# Patient Record
Sex: FEMALE | Race: WHITE | Employment: OTHER | URBAN - METROPOLITAN AREA
[De-identification: names, ages, dates, MRNs, and addresses within clinical notes are randomized per-mention and may not be internally consistent; named-entity substitution may affect disease eponyms.]

---

## 2017-01-09 ENCOUNTER — OFFICE VISIT (OUTPATIENT)
Dept: ORTHOPEDICS CLINIC | Facility: CLINIC | Age: 42
End: 2017-01-09

## 2017-01-09 DIAGNOSIS — M22.41 CHONDROMALACIA, PATELLA, RIGHT: Primary | ICD-10-CM

## 2017-01-09 PROCEDURE — 99214 OFFICE O/P EST MOD 30 MIN: CPT | Performed by: ORTHOPAEDIC SURGERY

## 2017-01-09 PROCEDURE — 99212 OFFICE O/P EST SF 10 MIN: CPT | Performed by: ORTHOPAEDIC SURGERY

## 2017-01-09 NOTE — PROGRESS NOTES
In the parapatellar location. It is worse when she does stairs or bending like maneuvers and she is aware of occasional click when she goes from a flexed to an extended position.   She has had episodes of falling but there was not a trauma that initiated t unremarkable for any advanced arthritis    Impression right knee patellar chondromalacia    Plan I discussed with her the biomechanics that can aggravate this stairclimbing can aggravate it squatting deep knee bending.   I recommended of aggressive physical

## 2017-01-21 ENCOUNTER — HOSPITAL ENCOUNTER (OUTPATIENT)
Dept: NUTRITION | Facility: HOSPITAL | Age: 42
Discharge: HOME | End: 2017-01-21
Attending: INTERNAL MEDICINE
Payer: MEDICAID

## 2017-01-21 DIAGNOSIS — E66.01 MORBID OBESITY (HCC): Primary | ICD-10-CM

## 2017-01-21 PROCEDURE — 97803 MED NUTRITION INDIV SUBSEQ: CPT | Performed by: DIETITIAN, REGISTERED

## 2017-01-21 NOTE — PROGRESS NOTES
Nutrition Assessment    Elier Cash is a 39year old female. Referred by:  Attending  Referring Physician Kiara Vegas MD   Assessment     Medical Nutrition Therapy Comment: Morbid Obesity  Visit Information: Follow-up Visit 1/21/17    [facility] Verbalize Understanding    Patient and/or Family Ability to Learn: Retain Information    Readiness to Learn:  Motivated    Barriers to Learning: None      1/21/2017  Maria C Porter RD

## 2017-01-27 ENCOUNTER — OFFICE VISIT (OUTPATIENT)
Dept: PHYSICAL THERAPY | Facility: HOSPITAL | Age: 42
End: 2017-01-27
Attending: ORTHOPAEDIC SURGERY
Payer: MEDICAID

## 2017-01-27 DIAGNOSIS — M22.41 CHONDROMALACIA, PATELLA, RIGHT: Primary | ICD-10-CM

## 2017-01-27 PROCEDURE — 97110 THERAPEUTIC EXERCISES: CPT

## 2017-01-27 PROCEDURE — 97162 PT EVAL MOD COMPLEX 30 MIN: CPT

## 2017-01-27 NOTE — PROGRESS NOTES
LOWER EXTREMITY EVALUATION:   Referring Physician: Dr. Guy Ruvalcaba  Date of Onset: 6 months ago Date of Service: 1/27/2017   Diagnosis: Chondromalacia patella, right   PATIENT SUMMARY:   Elier Cash is a 39year old y/o female who presents to therapy toda These deficits are contributing to difficulty with ambulation, stairs, and prolonged sitting. Unice Motts would benefit from skilled Physical Therapy to address the above impairments to allow for return to prior level of function.     Precautions:  None     OB Stim; Patient education; Home exercise program instruction    Education or treatment limitation: None  Rehab Potential:good      Patient was advised of these findings, precautions, and treatment options and has agreed to actively participate in planning an

## 2017-02-01 ENCOUNTER — OFFICE VISIT (OUTPATIENT)
Dept: PHYSICAL THERAPY | Facility: HOSPITAL | Age: 42
End: 2017-02-01
Attending: ORTHOPAEDIC SURGERY
Payer: MEDICAID

## 2017-02-01 DIAGNOSIS — M22.41 CHONDROMALACIA, PATELLA, RIGHT: Primary | ICD-10-CM

## 2017-02-01 PROCEDURE — 97110 THERAPEUTIC EXERCISES: CPT

## 2017-02-01 NOTE — PROGRESS NOTES
DX: Chondromalacia patella, right   Authorized # of Visits:  2         Next MD visit: none scheduled  Fall Risk: standard         Precautions: n/a           Medication Changes since last visit?: No  Subjective: Patient reports that right knee 5/10 pain tod

## 2017-02-03 ENCOUNTER — OFFICE VISIT (OUTPATIENT)
Dept: PHYSICAL THERAPY | Facility: HOSPITAL | Age: 42
End: 2017-02-03
Attending: ORTHOPAEDIC SURGERY
Payer: MEDICAID

## 2017-02-03 DIAGNOSIS — M22.41 CHONDROMALACIA, PATELLA, RIGHT: Primary | ICD-10-CM

## 2017-02-03 PROCEDURE — 97110 THERAPEUTIC EXERCISES: CPT

## 2017-02-03 NOTE — PROGRESS NOTES
DX: Chondromalacia patella, right   Authorized # of Visits:  3/7         Next MD visit: none scheduled  Fall Risk: standard         Precautions: n/a           Medication Changes since last visit?: No  Subjective: Patient reports her knee has been really so

## 2017-02-06 ENCOUNTER — OFFICE VISIT (OUTPATIENT)
Dept: PHYSICAL THERAPY | Facility: HOSPITAL | Age: 42
End: 2017-02-06
Attending: ORTHOPAEDIC SURGERY
Payer: MEDICAID

## 2017-02-06 DIAGNOSIS — M22.41 CHONDROMALACIA, PATELLA, RIGHT: Primary | ICD-10-CM

## 2017-02-06 PROCEDURE — 97110 THERAPEUTIC EXERCISES: CPT

## 2017-02-06 NOTE — PROGRESS NOTES
DX: Chondromalacia patella, right   Authorized # of Visits:  4/7         Next MD visit: none scheduled  Fall Risk: standard         Precautions: n/a           Medication Changes since last visit?: No  Subjective: Patient reports a little soreness in her kn

## 2017-02-08 ENCOUNTER — APPOINTMENT (OUTPATIENT)
Dept: PHYSICAL THERAPY | Facility: HOSPITAL | Age: 42
End: 2017-02-08
Attending: ORTHOPAEDIC SURGERY
Payer: MEDICAID

## 2017-02-10 ENCOUNTER — OFFICE VISIT (OUTPATIENT)
Dept: PHYSICAL THERAPY | Facility: HOSPITAL | Age: 42
End: 2017-02-10
Attending: ORTHOPAEDIC SURGERY
Payer: MEDICAID

## 2017-02-10 PROCEDURE — 97110 THERAPEUTIC EXERCISES: CPT

## 2017-02-10 NOTE — PROGRESS NOTES
DX: Chondromalacia patella, right   Authorized # of Visits:  5/7         Next MD visit: none scheduled  Fall Risk: standard         Precautions: n/a           Medication Changes since last visit?: No  Subjective: Patient reports she is feeling better, noti

## 2017-02-13 ENCOUNTER — APPOINTMENT (OUTPATIENT)
Dept: PHYSICAL THERAPY | Facility: HOSPITAL | Age: 42
End: 2017-02-13
Attending: ORTHOPAEDIC SURGERY
Payer: MEDICAID

## 2017-02-15 ENCOUNTER — APPOINTMENT (OUTPATIENT)
Dept: PHYSICAL THERAPY | Facility: HOSPITAL | Age: 42
End: 2017-02-15
Attending: ORTHOPAEDIC SURGERY
Payer: MEDICAID

## 2017-02-17 ENCOUNTER — OFFICE VISIT (OUTPATIENT)
Dept: PHYSICAL THERAPY | Facility: HOSPITAL | Age: 42
End: 2017-02-17
Attending: ORTHOPAEDIC SURGERY
Payer: MEDICAID

## 2017-02-17 DIAGNOSIS — M22.41 CHONDROMALACIA, PATELLA, RIGHT: Primary | ICD-10-CM

## 2017-02-17 PROCEDURE — 97110 THERAPEUTIC EXERCISES: CPT

## 2017-02-17 NOTE — PROGRESS NOTES
DX: Chondromalacia patella, right   Authorized # of Visits:  6/7         Next MD visit: none scheduled  Fall Risk: standard         Precautions: n/a           Medication Changes since last visit?: No  Subjective: Patient reports the knee has been feeling g

## 2017-02-21 ENCOUNTER — OFFICE VISIT (OUTPATIENT)
Dept: INTERNAL MEDICINE CLINIC | Facility: CLINIC | Age: 42
End: 2017-02-21

## 2017-02-21 VITALS
DIASTOLIC BLOOD PRESSURE: 78 MMHG | WEIGHT: 262 LBS | RESPIRATION RATE: 20 BRPM | SYSTOLIC BLOOD PRESSURE: 119 MMHG | TEMPERATURE: 99 F | HEART RATE: 76 BPM | HEIGHT: 60 IN | BODY MASS INDEX: 51.44 KG/M2

## 2017-02-21 DIAGNOSIS — L03.90 CELLULITIS, UNSPECIFIED CELLULITIS SITE: ICD-10-CM

## 2017-02-21 DIAGNOSIS — J01.00 ACUTE NON-RECURRENT MAXILLARY SINUSITIS: ICD-10-CM

## 2017-02-21 DIAGNOSIS — H66.91 OTITIS, RIGHT: Primary | ICD-10-CM

## 2017-02-21 PROCEDURE — 99213 OFFICE O/P EST LOW 20 MIN: CPT | Performed by: INTERNAL MEDICINE

## 2017-02-21 PROCEDURE — 99212 OFFICE O/P EST SF 10 MIN: CPT | Performed by: INTERNAL MEDICINE

## 2017-02-21 RX ORDER — RIZATRIPTAN BENZOATE 10 MG/1
TABLET ORAL AS NEEDED
Refills: 5 | COMMUNITY
Start: 2016-12-30 | End: 2019-02-06

## 2017-02-21 RX ORDER — CHLORHEXIDINE GLUCONATE 0.12 MG/ML
RINSE ORAL
Refills: 0 | COMMUNITY
Start: 2017-02-09 | End: 2017-04-17 | Stop reason: ALTCHOICE

## 2017-02-21 RX ORDER — NEOMYCIN SULFATE, POLYMYXIN B SULFATE, HYDROCORTISONE 3.5; 10000; 1 MG/ML; [USP'U]/ML; MG/ML
SOLUTION/ DROPS AURICULAR (OTIC)
Qty: 1 BOTTLE | Refills: 1 | Status: SHIPPED | OUTPATIENT
Start: 2017-02-21 | End: 2017-04-17 | Stop reason: ALTCHOICE

## 2017-02-21 RX ORDER — CEFADROXIL 500 MG/1
500 CAPSULE ORAL 2 TIMES DAILY
Qty: 20 CAPSULE | Refills: 1 | Status: SHIPPED | OUTPATIENT
Start: 2017-02-21 | End: 2017-04-17 | Stop reason: ALTCHOICE

## 2017-02-22 NOTE — PROGRESS NOTES
HPI:    Patient ID: Alfredo Alcaraz is a 39year old female. HPI    Right ear ache  Sinus pressure  c/o right earache for about 4 days. Noticed small lump behind ear.      Right buttocks sore  Unsure of etiology    /78 mmHg  Pulse 76  Temp(Src) 98 Shampoo 2-3 x weekly as directed Disp: 120 mL Rfl: 10   Cholecalciferol (VITAMIN D3) 2000 UNITS Oral Tab Take by mouth daily. Disp:  Rfl:    Calcium Carbonate-Vit D-Min (CALCIUM 1200 OR) Take by mouth daily.  Disp:  Rfl:    Multiple Vitamins-Minerals (MULTI ASSESSMENT/PLAN:   (H66.91) Otitis, right  (primary encounter diagnosis)  Plan: RX cortisporin    (J01.00) Acute non-recurrent maxillary sinusitis  Plan: cefadroxil    (L03.90) Cellulitis, unspecified cellulitis site  Plan: cefadroxil  Keep woun

## 2017-02-24 ENCOUNTER — OFFICE VISIT (OUTPATIENT)
Dept: PHYSICAL THERAPY | Facility: HOSPITAL | Age: 42
End: 2017-02-24
Attending: ORTHOPAEDIC SURGERY
Payer: MEDICAID

## 2017-02-24 PROCEDURE — 97110 THERAPEUTIC EXERCISES: CPT

## 2017-02-24 NOTE — PROGRESS NOTES
Patient Name: Isabel Banuelos, : 1975, MRN: [id number]   Date:  2017  Referring Physician:  Wilbur Taylor    Diagnosis: Chondromalacia patella R    Progress Summary    Pt has attended 7 of 7 approved visits in Physical Therapy.      Progres day period. Treatment will include: R hip/quad strengthening, RLE stretching, proprioceptive training, STM/modalities as needed.         Patient/Family/Caregiver was advised of these findings, precautions, and treatment options and has agreed to actively pa

## 2017-03-06 ENCOUNTER — OFFICE VISIT (OUTPATIENT)
Dept: PHYSICAL THERAPY | Facility: HOSPITAL | Age: 42
End: 2017-03-06
Attending: ORTHOPAEDIC SURGERY
Payer: MEDICAID

## 2017-03-06 PROCEDURE — 97110 THERAPEUTIC EXERCISES: CPT

## 2017-03-06 NOTE — PROGRESS NOTES
DX: Chondromalacia patella, right   Authorized # of Visits:  8/13         Next MD visit: none scheduled  Fall Risk: standard         Precautions: n/a           Medication Changes since last visit?: No  Subjective: Patient reports the knee has been feeling

## 2017-03-10 ENCOUNTER — OFFICE VISIT (OUTPATIENT)
Dept: PHYSICAL THERAPY | Facility: HOSPITAL | Age: 42
End: 2017-03-10
Attending: ORTHOPAEDIC SURGERY
Payer: MEDICAID

## 2017-03-10 PROCEDURE — 97110 THERAPEUTIC EXERCISES: CPT

## 2017-03-10 NOTE — PROGRESS NOTES
DX: Chondromalacia patella, right   Authorized # of Visits:  9/13         Next MD visit: none scheduled  Fall Risk: standard         Precautions: n/a           Medication Changes since last visit?: No  Subjective: Patient reports she had one \"burst of ayad

## 2017-03-13 ENCOUNTER — OFFICE VISIT (OUTPATIENT)
Dept: PHYSICAL THERAPY | Facility: HOSPITAL | Age: 42
End: 2017-03-13
Attending: ORTHOPAEDIC SURGERY
Payer: MEDICAID

## 2017-03-13 PROCEDURE — 97110 THERAPEUTIC EXERCISES: CPT

## 2017-03-13 NOTE — PROGRESS NOTES
DX: Chondromalacia patella, right   Authorized # of Visits:  10/13         Next MD visit: none scheduled  Fall Risk: standard         Precautions: n/a           Medication Changes since last visit?: No  Subjective: \"I feel much better after last session n

## 2017-03-17 ENCOUNTER — OFFICE VISIT (OUTPATIENT)
Dept: PHYSICAL THERAPY | Facility: HOSPITAL | Age: 42
End: 2017-03-17
Attending: ORTHOPAEDIC SURGERY
Payer: MEDICAID

## 2017-03-17 PROCEDURE — 97110 THERAPEUTIC EXERCISES: CPT

## 2017-03-17 NOTE — PROGRESS NOTES
DX: Chondromalacia patella, right   Authorized # of Visits:  11/13         Next MD visit: none scheduled  Fall Risk: standard         Precautions: n/a           Medication Changes since last visit?: No  Subjective:  Patient reports her knee is sore due to

## 2017-03-18 ENCOUNTER — HOSPITAL ENCOUNTER (OUTPATIENT)
Dept: NUTRITION | Facility: HOSPITAL | Age: 42
Discharge: HOME | End: 2017-03-18
Attending: INTERNAL MEDICINE
Payer: MEDICAID

## 2017-03-18 DIAGNOSIS — E66.01 MORBID OBESITY DUE TO EXCESS CALORIES (HCC): Primary | ICD-10-CM

## 2017-03-18 PROCEDURE — 97803 MED NUTRITION INDIV SUBSEQ: CPT | Performed by: DIETITIAN, REGISTERED

## 2017-03-18 NOTE — PROGRESS NOTES
Nutrition Assessment    Zohra Diggs is a 39year old female. Referred by:  Attending  Referring Physician Name: Mel Damon MD   Assessment     Medical Nutrition Therapy Comment: Morbid Obesity  Visit Information: Follow-up Visit 3/18/17    [facility] when PT allows    Recommendation to MD: Continue f/u with RD every 6 weeks.  Next appointment 4/20/17    Assessment of Ability/Barriers     Patient and/or Family Will: Verbalize Understanding    Patient and/or Family Ability to Learn: Retain Information

## 2017-03-20 ENCOUNTER — OFFICE VISIT (OUTPATIENT)
Dept: PHYSICAL THERAPY | Facility: HOSPITAL | Age: 42
End: 2017-03-20
Attending: ORTHOPAEDIC SURGERY
Payer: MEDICAID

## 2017-03-20 PROCEDURE — 97110 THERAPEUTIC EXERCISES: CPT

## 2017-03-20 NOTE — PROGRESS NOTES
DX: Chondromalacia patella, right   Authorized # of Visits:  12/13         Next MD visit: none scheduled  Fall Risk: standard         Precautions: n/a           Medication Changes since last visit?: No  Subjective:  Patient reports her knee has been more a

## 2017-03-23 ENCOUNTER — OFFICE VISIT (OUTPATIENT)
Dept: PHYSICAL THERAPY | Facility: HOSPITAL | Age: 42
End: 2017-03-23
Attending: ORTHOPAEDIC SURGERY
Payer: MEDICAID

## 2017-03-23 PROCEDURE — 97110 THERAPEUTIC EXERCISES: CPT

## 2017-03-23 NOTE — PROGRESS NOTES
Patient Name: [place], : 1975, MRN: [id number]   Date:  3/23/2017  Referring Physician:  Jose Alejandro Abernathy    Diagnosis: Chondromalacia patella, right     Discharge Summary    Pt has attended 13 visits in Physical Therapy.      Progress Note Dept: [phone].     Sincerely,  Sugey Castro    Electronically signed by therapist: Sugey Castro, PT        DX: Chondromalacia patella, right   Authorized # of Visits:  13/13         Next MD visit: none scheduled  Fall Risk: standard         Precautio

## 2017-03-29 ENCOUNTER — TELEPHONE (OUTPATIENT)
Dept: INTERNAL MEDICINE CLINIC | Facility: CLINIC | Age: 42
End: 2017-03-29

## 2017-03-29 NOTE — TELEPHONE ENCOUNTER
[facility] would like to know if the cpap/bipap renewal questionire that was faxed on 8/17/16 is still on file? If so they would like to have copy of it faxed back over please. Fax:[phone].  If not then they can resend it but it will

## 2017-04-17 ENCOUNTER — OFFICE VISIT (OUTPATIENT)
Dept: INTERNAL MEDICINE CLINIC | Facility: CLINIC | Age: 42
End: 2017-04-17

## 2017-04-17 VITALS
HEART RATE: 80 BPM | DIASTOLIC BLOOD PRESSURE: 79 MMHG | TEMPERATURE: 98 F | SYSTOLIC BLOOD PRESSURE: 121 MMHG | RESPIRATION RATE: 18 BRPM | WEIGHT: 265 LBS | HEIGHT: 60 IN | BODY MASS INDEX: 52.03 KG/M2

## 2017-04-17 DIAGNOSIS — L65.9 HAIR LOSS: Primary | ICD-10-CM

## 2017-04-17 DIAGNOSIS — G47.33 OSA ON CPAP: ICD-10-CM

## 2017-04-17 DIAGNOSIS — Z99.89 OSA ON CPAP: ICD-10-CM

## 2017-04-17 DIAGNOSIS — I83.93 VARICOSE VEINS OF BOTH LOWER EXTREMITIES: ICD-10-CM

## 2017-04-17 DIAGNOSIS — E66.01 MORBID OBESITY DUE TO EXCESS CALORIES (HCC): ICD-10-CM

## 2017-04-17 PROCEDURE — 99212 OFFICE O/P EST SF 10 MIN: CPT | Performed by: INTERNAL MEDICINE

## 2017-04-17 PROCEDURE — 99214 OFFICE O/P EST MOD 30 MIN: CPT | Performed by: INTERNAL MEDICINE

## 2017-04-17 RX ORDER — AZITHROMYCIN 500 MG/1
500 TABLET, FILM COATED ORAL DAILY
Qty: 5 TABLET | Refills: 1 | Status: SHIPPED | OUTPATIENT
Start: 2017-04-17 | End: 2017-07-25 | Stop reason: ALTCHOICE

## 2017-04-30 NOTE — PROGRESS NOTES
HPI:    Patient ID: Frances Mcdonough is a 39year old female. HPI     Follow up      Patient c/o blurred vision for past 1 month. Patient c/o hair loss to scalp for past 6 months and increased the past 2 months.      Extreme obesity  gianing wt  Was se Negative for cough, chest tightness, shortness of breath and wheezing. Cardiovascular: Positive for leg swelling. Negative for chest pain and palpitations.         Varicose veins   Gastrointestinal: Negative for nausea, vomiting, abdominal pain, diarrhea Smoking Status: Former Smoker                   Packs/Day: 0.00  Years: 20        Types: Cigarettes      Quit date: 01/01/2013    Smokeless Status: Never Used                        Alcohol Use: Yes           0.0 oz/week       0 Standard drinks or equivale and agrees with plan      Meds This Visit:  Signed Prescriptions Disp Refills    azithromycin (ZITHROMAX) 500 MG Oral Tab 5 tablet 1      Sig: Take 1 tablet (500 mg total) by mouth daily.            Imaging & Referrals:  DERM - INTERNAL  BARIATRICS - INTERN

## 2017-05-17 ENCOUNTER — OFFICE VISIT (OUTPATIENT)
Dept: DERMATOLOGY CLINIC | Facility: CLINIC | Age: 42
End: 2017-05-17

## 2017-05-17 DIAGNOSIS — L21.9 SEBORRHEIC DERMATITIS: Primary | ICD-10-CM

## 2017-05-17 DIAGNOSIS — L65.9 ALOPECIA: ICD-10-CM

## 2017-05-17 DIAGNOSIS — L30.9 DERMATITIS: ICD-10-CM

## 2017-05-17 PROCEDURE — 99213 OFFICE O/P EST LOW 20 MIN: CPT | Performed by: DERMATOLOGY

## 2017-05-17 PROCEDURE — 99212 OFFICE O/P EST SF 10 MIN: CPT | Performed by: DERMATOLOGY

## 2017-05-17 RX ORDER — DIAPER,BRIEF,INFANT-TODD,DISP
EACH MISCELLANEOUS 2 TIMES DAILY
COMMUNITY

## 2017-05-17 RX ORDER — MOMETASONE FUROATE 1 MG/G
1 CREAM TOPICAL DAILY
Qty: 45 G | Refills: 0 | Status: SHIPPED | OUTPATIENT
Start: 2017-05-17 | End: 2020-06-25

## 2017-05-17 RX ORDER — CLOBETASOL PROPIONATE 0.46 MG/ML
1 SOLUTION TOPICAL 2 TIMES DAILY
Qty: 50 ML | Refills: 6 | Status: SHIPPED | OUTPATIENT
Start: 2017-05-17 | End: 2018-05-17

## 2017-05-17 RX ORDER — KETOCONAZOLE 20 MG/G
CREAM TOPICAL DAILY
COMMUNITY
End: 2018-08-01 | Stop reason: ALTCHOICE

## 2017-06-01 ENCOUNTER — HOSPITAL ENCOUNTER (OUTPATIENT)
Dept: NUTRITION | Facility: HOSPITAL | Age: 42
Discharge: HOME | End: 2017-06-01
Attending: INTERNAL MEDICINE
Payer: MEDICAID

## 2017-06-01 DIAGNOSIS — E66.01 MORBID OBESITY DUE TO EXCESS CALORIES (HCC): Primary | ICD-10-CM

## 2017-06-01 PROCEDURE — 97803 MED NUTRITION INDIV SUBSEQ: CPT | Performed by: DIETITIAN, REGISTERED

## 2017-06-02 NOTE — PROGRESS NOTES
Nutrition Assessment    Chantale Jarrett is a 39year old female. Referred by:  Attending  Referring Physician Name: Yisel Florence Nutrition Therapy Comment: Morbid obesity  Visit Information: Follow-up Visit 6/1/17    [facility] Family Ability to Learn: Retain Information    Readiness to Learn:  Motivated    Barriers to Learning: None    I    6/1/2017  Jessica Chen RD

## 2017-06-04 NOTE — PROGRESS NOTES
Danielle Chappell is a 39year old female. Patient presents with:  Alopecia: former pt of SD. presents with flaky scalp and face and also alopecia.  pt using ketoconazole 2% shampoo 1x week, and mixes ketoconazole 2% cream with HC 1%cream and applies to 0.0 oz/week       0 Standard drinks or equivalent per week       Comment: RARELY                  Current Outpatient Prescriptions:  ketoconazole 2 % External Cream Apply topically daily.  Disp:  Rfl:    hydrocortisone 1 % External Cream Apply topically 2 ( Comment: RARELY    Drug Use: No    Sexual Activity: Not Currently    Partners: Male    Birth Control/ Protection: OCP     Other Topics Concern    Caffeine Concern No    Comment: diet soda daily    Sleep Concern Yes    Comment: wakes several times    Exe erythematous patches scattered throughout the scalp. No obvious scarring.     Exam otherwise significant for erythematous patches at brows, central face      ASSESSMENT AND PLAN:     Seborrheic dermatitis  (primary encounter diagnosis)  Alopecia  Dermatiti diagnosis)  Alopecia  Dermatitis    No orders of the defined types were placed in this encounter. Results From Past 48 Hours:  No results found for this or any previous visit (from the past 48 hour(s)).     Meds This Visit:      Imaging Orders:  None

## 2017-06-15 ENCOUNTER — APPOINTMENT (OUTPATIENT)
Dept: LAB | Facility: HOSPITAL | Age: 42
End: 2017-06-15
Attending: INTERNAL MEDICINE
Payer: MEDICAID

## 2017-06-15 ENCOUNTER — OFFICE VISIT (OUTPATIENT)
Dept: SURGERY | Facility: CLINIC | Age: 42
End: 2017-06-15

## 2017-06-15 VITALS
SYSTOLIC BLOOD PRESSURE: 138 MMHG | HEART RATE: 72 BPM | WEIGHT: 270.63 LBS | HEIGHT: 58.5 IN | BODY MASS INDEX: 55.29 KG/M2 | RESPIRATION RATE: 16 BRPM | DIASTOLIC BLOOD PRESSURE: 96 MMHG

## 2017-06-15 DIAGNOSIS — M17.0 PRIMARY OSTEOARTHRITIS OF BOTH KNEES: ICD-10-CM

## 2017-06-15 DIAGNOSIS — G47.33 OSA ON CPAP: Primary | ICD-10-CM

## 2017-06-15 DIAGNOSIS — E66.01 MORBID OBESITY WITH BMI OF 50.0-59.9, ADULT (HCC): ICD-10-CM

## 2017-06-15 DIAGNOSIS — R63.2 BINGE EATING: ICD-10-CM

## 2017-06-15 DIAGNOSIS — G47.33 OSA ON CPAP: ICD-10-CM

## 2017-06-15 DIAGNOSIS — R60.0 LOWER EXTREMITY EDEMA: ICD-10-CM

## 2017-06-15 DIAGNOSIS — Z99.89 OSA ON CPAP: Primary | ICD-10-CM

## 2017-06-15 DIAGNOSIS — R53.82 CHRONIC FATIGUE: ICD-10-CM

## 2017-06-15 DIAGNOSIS — Z99.89 OSA ON CPAP: ICD-10-CM

## 2017-06-15 PROBLEM — M17.9 DJD (DEGENERATIVE JOINT DISEASE) OF KNEE: Status: ACTIVE | Noted: 2017-06-15

## 2017-06-15 PROBLEM — R53.83 FATIGUE: Status: ACTIVE | Noted: 2017-06-15

## 2017-06-15 PROBLEM — M17.10 DJD (DEGENERATIVE JOINT DISEASE) OF KNEE: Status: ACTIVE | Noted: 2017-06-15

## 2017-06-15 PROCEDURE — 93005 ELECTROCARDIOGRAM TRACING: CPT

## 2017-06-15 PROCEDURE — 93010 ELECTROCARDIOGRAM REPORT: CPT | Performed by: INTERNAL MEDICINE

## 2017-06-15 PROCEDURE — 99204 OFFICE O/P NEW MOD 45 MIN: CPT | Performed by: INTERNAL MEDICINE

## 2017-06-15 RX ORDER — HYDROCHLOROTHIAZIDE 12.5 MG/1
12.5 CAPSULE, GELATIN COATED ORAL DAILY
Qty: 30 CAPSULE | Refills: 1 | Status: SHIPPED | OUTPATIENT
Start: 2017-06-15 | End: 2017-08-13

## 2017-06-15 NOTE — PROGRESS NOTES
[facility]ness [facility] Weight Loss Consultation Note       Date of Consult:  6/15/2017    Patient:  Simran Marvin  :      1975  MRN:      [id number]    Referring Provider: Dr. Ceferino Pacheco       Chief Complaint:  Patient presents with:  Consult  Weight M Tab as needed. Disp:  Rfl: 5   Docosahexaenoic Acid (DHA OMEGA 3 OR) Take by mouth. Disp:  Rfl:    Ketoconazole 2 % External Shampoo 2-3 x weekly as directed Disp: 120 mL Rfl: 10   Cholecalciferol (VITAMIN D3) 2000 UNITS Oral Tab Take by mouth daily.  Dis Noodles, rice, chicken, veggies, diet soda Chips, cheese Chicken, veggies, potatoes, noodles   +portion side    Soda Drinker?: Yes  If yes, how much?:  diet    Number of restaurant or fast food meals/week:  2 meals/week    Nutritional Goals Reviewed and Deon Carlos (primary encounter diagnosis)  Chronic fatigue  Binge eating  Primary osteoarthritis of both knees  Lower extremity edema  Morbid obesity with BMI of 50.0-59.9, adult [facility])    PLAN     Patient is not interested in bariatric surgery.  Patient desires to purs

## 2017-06-21 ENCOUNTER — TELEPHONE (OUTPATIENT)
Dept: SURGERY | Facility: CLINIC | Age: 42
End: 2017-06-21

## 2017-06-22 RX ORDER — PHENTERMINE HYDROCHLORIDE 15 MG/1
15 CAPSULE ORAL EVERY MORNING
Qty: 30 CAPSULE | Refills: 0 | OUTPATIENT
Start: 2017-06-22 | End: 2017-08-29

## 2017-07-11 RX ORDER — KETOCONAZOLE 20 MG/ML
SHAMPOO TOPICAL
Qty: 120 ML | Refills: 6 | Status: SHIPPED | OUTPATIENT
Start: 2017-07-11 | End: 2018-10-13

## 2017-07-17 RX ORDER — RIZATRIPTAN BENZOATE 10 MG/1
TABLET ORAL
Qty: 9 TABLET | Refills: 0 | OUTPATIENT
Start: 2017-07-17

## 2017-07-20 ENCOUNTER — OFFICE VISIT (OUTPATIENT)
Dept: SURGERY | Facility: CLINIC | Age: 42
End: 2017-07-20

## 2017-07-20 VITALS
HEART RATE: 84 BPM | SYSTOLIC BLOOD PRESSURE: 132 MMHG | WEIGHT: 265.44 LBS | OXYGEN SATURATION: 100 % | BODY MASS INDEX: 54.23 KG/M2 | DIASTOLIC BLOOD PRESSURE: 87 MMHG | HEIGHT: 58.5 IN

## 2017-07-20 DIAGNOSIS — M17.0 PRIMARY OSTEOARTHRITIS OF BOTH KNEES: ICD-10-CM

## 2017-07-20 DIAGNOSIS — G47.33 OSA ON CPAP: Primary | ICD-10-CM

## 2017-07-20 DIAGNOSIS — E66.01 MORBID OBESITY WITH BMI OF 50.0-59.9, ADULT (HCC): ICD-10-CM

## 2017-07-20 DIAGNOSIS — R53.82 CHRONIC FATIGUE: ICD-10-CM

## 2017-07-20 DIAGNOSIS — Z99.89 OSA ON CPAP: Primary | ICD-10-CM

## 2017-07-20 DIAGNOSIS — F43.9 STRESS: ICD-10-CM

## 2017-07-20 DIAGNOSIS — Z51.81 ENCOUNTER FOR THERAPEUTIC DRUG MONITORING: ICD-10-CM

## 2017-07-20 DIAGNOSIS — R63.2 BINGE EATING: ICD-10-CM

## 2017-07-20 DIAGNOSIS — R60.0 LOWER EXTREMITY EDEMA: ICD-10-CM

## 2017-07-20 PROCEDURE — 99214 OFFICE O/P EST MOD 30 MIN: CPT | Performed by: INTERNAL MEDICINE

## 2017-07-20 RX ORDER — PHENTERMINE HYDROCHLORIDE 37.5 MG/1
37.5 TABLET ORAL
Qty: 30 TABLET | Refills: 1 | Status: SHIPPED | OUTPATIENT
Start: 2017-07-20 | End: 2017-08-31

## 2017-07-20 RX ORDER — SERTRALINE HYDROCHLORIDE 25 MG/1
25 TABLET, FILM COATED ORAL DAILY
Qty: 30 TABLET | Refills: 1 | Status: SHIPPED | OUTPATIENT
Start: 2017-07-20 | End: 2017-08-30

## 2017-07-20 NOTE — PROGRESS NOTES
[address], [facility]  [address] [facility] [address] [facility] [address]  Dept: [phone]     Date:   2017    Patient:  Josee Paulson  :      1975  MRN:      [id number]    Chief Complaint: Solution Apply 1 mL topically 2 (two) times daily. Disp: 50 mL Rfl: 6   Mometasone Furoate 0.1 % External Cream Apply 1 Application topically daily. Apply a thin film to the affected area(s).  Disp: 45 g Rfl: 0   azithromycin (ZITHROMAX) 500 MG Oral Tab Ezequiel Osier a Food Journal?: yes   · Patient is reading nutrition labels? yes  · Average Caloric Intake:     · Average CHO Intake: 100  · Is patient exercising? yes  · Type of exercise?  ADL's    Eating Habits  · Patient states the following:  · Eats 3 meal(s) per day no cyanosis or edema  Pulses: 2+ and symmetric  Skin: Skin color, texture, turgor normal. No rashes or lesions    ASSESSMENT     OBSTRUCTIVE SLEEP APNEA: The patient states her sleep apnea has been stable since the last clinic visit.  There has not been any

## 2017-07-25 ENCOUNTER — OFFICE VISIT (OUTPATIENT)
Dept: PULMONOLOGY | Facility: CLINIC | Age: 42
End: 2017-07-25

## 2017-07-25 VITALS
HEIGHT: 58 IN | DIASTOLIC BLOOD PRESSURE: 75 MMHG | BODY MASS INDEX: 56.25 KG/M2 | SYSTOLIC BLOOD PRESSURE: 113 MMHG | HEART RATE: 98 BPM | WEIGHT: 268 LBS | OXYGEN SATURATION: 98 %

## 2017-07-25 DIAGNOSIS — G47.33 OSA ON CPAP: Primary | ICD-10-CM

## 2017-07-25 DIAGNOSIS — Z99.89 OSA ON CPAP: Primary | ICD-10-CM

## 2017-07-25 PROCEDURE — 99212 OFFICE O/P EST SF 10 MIN: CPT | Performed by: INTERNAL MEDICINE

## 2017-07-25 PROCEDURE — 99243 OFF/OP CNSLTJ NEW/EST LOW 30: CPT | Performed by: INTERNAL MEDICINE

## 2017-07-25 NOTE — PROGRESS NOTES
Dear  Sharmaine Greentiff :           As you know, Nat Muniz is a 41-year-old female who I am now evaluating for sleep disturbance.     HISTORY OF PRESENT ILLNESS: The patient has a history of moderately severe obstructive sleep apnea and has been doing well on CPAP 6 cm ketoconazole Zoloft hydrochlorothiazide phentermine    REVIEW OF SYSTEMS: Vision notable for blurring. Ear nose and throat normal. Bowel normal. Bladder function normal. No depression. No thyroid disease. No rash. Muscles and joints unremarkable.  No weight asleep, she can get out of bed for 15-20 minutes and reads something boring such as the warranty of her refrigerator. 12.  See me in the office in follow-up at the 3-6 month interval.    I am delighted to assist in Alix's care.             With warmest

## 2017-08-01 ENCOUNTER — OFFICE VISIT (OUTPATIENT)
Dept: INTERNAL MEDICINE CLINIC | Facility: CLINIC | Age: 42
End: 2017-08-01

## 2017-08-01 VITALS
DIASTOLIC BLOOD PRESSURE: 76 MMHG | WEIGHT: 265 LBS | HEIGHT: 58.5 IN | TEMPERATURE: 98 F | HEART RATE: 81 BPM | BODY MASS INDEX: 54.14 KG/M2 | SYSTOLIC BLOOD PRESSURE: 111 MMHG

## 2017-08-01 DIAGNOSIS — M17.0 PRIMARY OSTEOARTHRITIS OF BOTH KNEES: ICD-10-CM

## 2017-08-01 DIAGNOSIS — G47.33 OBSTRUCTIVE SLEEP APNEA (ADULT) (PEDIATRIC): Primary | ICD-10-CM

## 2017-08-01 DIAGNOSIS — E66.01 MORBID OBESITY DUE TO EXCESS CALORIES (HCC): ICD-10-CM

## 2017-08-01 DIAGNOSIS — I83.93 VARICOSE VEINS OF BOTH LOWER EXTREMITIES: ICD-10-CM

## 2017-08-01 DIAGNOSIS — E55.9 VITAMIN D DEFICIENCY: ICD-10-CM

## 2017-08-01 DIAGNOSIS — R53.82 CHRONIC FATIGUE: ICD-10-CM

## 2017-08-01 DIAGNOSIS — R60.0 LOWER EXTREMITY EDEMA: ICD-10-CM

## 2017-08-01 PROCEDURE — 99212 OFFICE O/P EST SF 10 MIN: CPT | Performed by: INTERNAL MEDICINE

## 2017-08-01 PROCEDURE — 99214 OFFICE O/P EST MOD 30 MIN: CPT | Performed by: INTERNAL MEDICINE

## 2017-08-09 NOTE — PROGRESS NOTES
HPI:    Patient ID: Jaya Villarreal is a 39year old female. HPI  f/u to discuss ov w/specialist plan of care  and CPAP  Review of Systems   Constitutional: Positive for fatigue. Negative for activity change, chills and fever.    HENT: Negative for ear Mometasone Furoate 0.1 % External Cream Apply 1 Application topically daily. Apply a thin film to the affected area(s). Disp: 45 g Rfl: 0   Rizatriptan Benzoate 10 MG Oral Tab as needed.    Disp:  Rfl: 5   Docosahexaenoic Acid (DHA OMEGA 3 OR) Take by chapis Neck: Neck supple. No thyromegaly present. Cardiovascular: Normal rate, regular rhythm, S1 normal, S2 normal, normal heart sounds and intact distal pulses. Exam reveals no gallop. No murmur heard.   Pulmonary/Chest: Effort normal and breath sounds n

## 2017-08-13 DIAGNOSIS — R60.0 LOWER EXTREMITY EDEMA: ICD-10-CM

## 2017-08-14 RX ORDER — HYDROCHLOROTHIAZIDE 12.5 MG/1
CAPSULE, GELATIN COATED ORAL
Qty: 30 CAPSULE | Refills: 0 | Status: SHIPPED | OUTPATIENT
Start: 2017-08-14 | End: 2018-08-01

## 2017-08-15 ENCOUNTER — TELEPHONE (OUTPATIENT)
Dept: SURGERY | Facility: CLINIC | Age: 42
End: 2017-08-15

## 2017-08-15 NOTE — TELEPHONE ENCOUNTER
Patient called with concerns of interaction between the two medications that you gave her Phentermine and the Sertraline.  Please advise

## 2017-08-29 ENCOUNTER — TELEPHONE (OUTPATIENT)
Dept: OBGYN CLINIC | Facility: CLINIC | Age: 42
End: 2017-08-29

## 2017-08-29 ENCOUNTER — OFFICE VISIT (OUTPATIENT)
Dept: OBGYN CLINIC | Facility: CLINIC | Age: 42
End: 2017-08-29

## 2017-08-29 VITALS
BODY MASS INDEX: 53.33 KG/M2 | DIASTOLIC BLOOD PRESSURE: 73 MMHG | HEIGHT: 58.5 IN | HEART RATE: 82 BPM | WEIGHT: 261 LBS | SYSTOLIC BLOOD PRESSURE: 123 MMHG

## 2017-08-29 DIAGNOSIS — Z11.3 SCREEN FOR STD (SEXUALLY TRANSMITTED DISEASE): ICD-10-CM

## 2017-08-29 DIAGNOSIS — Z12.4 SCREENING FOR MALIGNANT NEOPLASM OF CERVIX: ICD-10-CM

## 2017-08-29 DIAGNOSIS — Z12.31 ENCOUNTER FOR SCREENING MAMMOGRAM FOR BREAST CANCER: ICD-10-CM

## 2017-08-29 DIAGNOSIS — Z01.419 ENCOUNTER FOR GYNECOLOGICAL EXAMINATION: Primary | ICD-10-CM

## 2017-08-29 PROCEDURE — 99396 PREV VISIT EST AGE 40-64: CPT | Performed by: OBSTETRICS & GYNECOLOGY

## 2017-08-29 NOTE — TELEPHONE ENCOUNTER
PER PT STATE SHE HAS AN APPT SCHEDULE FOR TODAY AT 3:20 FOR HER ANNUAL / PT STATE SHE'S ON THE LAST DAY OF HER MENSES / PINKISH COLOR / PT WANT TO KNOW IF SHE NEED TO RESCHEDULE OR SHOULD SHE KEEP HER APPT FOR TODAY / PLS ADV

## 2017-08-29 NOTE — TELEPHONE ENCOUNTER
Informed pt that she can keep her appt for today since it's just light spotting. Pt verbalized understanding.

## 2017-08-30 DIAGNOSIS — F43.9 STRESS: ICD-10-CM

## 2017-08-30 LAB
C TRACH DNA SPEC QL NAA+PROBE: NEGATIVE
HPV I/H RISK 1 DNA SPEC QL NAA+PROBE: POSITIVE
N GONORRHOEA DNA SPEC QL NAA+PROBE: NEGATIVE
T VAGINALIS RRNA SPEC QL NAA+PROBE: NEGATIVE

## 2017-08-30 RX ORDER — SERTRALINE HYDROCHLORIDE 25 MG/1
25 TABLET, FILM COATED ORAL DAILY
Qty: 30 TABLET | Refills: 1 | Status: SHIPPED
Start: 2017-08-30 | End: 2017-08-31 | Stop reason: DRUGHIGH

## 2017-08-30 NOTE — TELEPHONE ENCOUNTER
From: Pranav Villar  Sent: 8/30/2017 7:47 AM CDT  Subject: Medication Renewal Request    Valerie Christiansen.  Jason Carrasquillo would like a refill of the following medications:  Sertraline HCl 25 MG Oral Tab Mercy Adkins MD]    Preferred pharmacy: [place] [phone]

## 2017-08-30 NOTE — PROGRESS NOTES
Nicanor Chatman is a 43year old female [id number] Patient's last menstrual period was 08/26/2017. here for annual exam.       Last seen 6/29/16. Had LEEP 1/6/15 with positive margins. Had 7/21/15 for repeat pap and ECC-- mild dyplasia.    Last pap 2/2016 Sertraline HCl 25 MG Oral Tab Take 1 tablet (25 mg total) by mouth daily.  Disp: 30 tablet Rfl: 1   Phentermine HCl 37.5 MG Oral Tab Take 1 tablet (37.5 mg total) by mouth every morning before breakfast. Disp: 30 tablet Rfl: 1   KETOCONAZOLE 2 % External Wt 261 lb (118.4 kg)   LMP 08/26/2017   BMI 53.62 kg/m²   Wt Readings from Last 2 Encounters:  08/29/17 : 261 lb (118.4 kg)  08/01/17 : 265 lb (120.2 kg)    Body mass index is 53.62 kg/m².     Constitutional: well developed, well nourished  Neck/Thyroid:

## 2017-08-31 ENCOUNTER — HOSPITAL ENCOUNTER (OUTPATIENT)
Dept: NUTRITION | Facility: HOSPITAL | Age: 42
Discharge: HOME | End: 2017-08-31
Attending: INTERNAL MEDICINE
Payer: COMMERCIAL

## 2017-08-31 ENCOUNTER — OFFICE VISIT (OUTPATIENT)
Dept: SURGERY | Facility: CLINIC | Age: 42
End: 2017-08-31

## 2017-08-31 VITALS
HEART RATE: 84 BPM | SYSTOLIC BLOOD PRESSURE: 118 MMHG | DIASTOLIC BLOOD PRESSURE: 72 MMHG | OXYGEN SATURATION: 100 % | RESPIRATION RATE: 16 BRPM | BODY MASS INDEX: 53.56 KG/M2 | WEIGHT: 262.13 LBS | HEIGHT: 58.5 IN

## 2017-08-31 DIAGNOSIS — R60.0 LOWER EXTREMITY EDEMA: ICD-10-CM

## 2017-08-31 DIAGNOSIS — E66.01 MORBID OBESITY (HCC): ICD-10-CM

## 2017-08-31 DIAGNOSIS — M17.0 PRIMARY OSTEOARTHRITIS OF BOTH KNEES: ICD-10-CM

## 2017-08-31 DIAGNOSIS — R63.2 BINGE EATING: ICD-10-CM

## 2017-08-31 DIAGNOSIS — G47.33 OSA ON CPAP: Primary | ICD-10-CM

## 2017-08-31 DIAGNOSIS — Z51.81 ENCOUNTER FOR THERAPEUTIC DRUG MONITORING: ICD-10-CM

## 2017-08-31 DIAGNOSIS — Z99.89 OSA ON CPAP: Primary | ICD-10-CM

## 2017-08-31 PROCEDURE — 97803 MED NUTRITION INDIV SUBSEQ: CPT | Performed by: DIETITIAN, REGISTERED

## 2017-08-31 PROCEDURE — 99214 OFFICE O/P EST MOD 30 MIN: CPT | Performed by: INTERNAL MEDICINE

## 2017-08-31 RX ORDER — PHENTERMINE HYDROCHLORIDE 37.5 MG/1
37.5 TABLET ORAL
Qty: 30 TABLET | Refills: 1 | Status: SHIPPED | OUTPATIENT
Start: 2017-08-31 | End: 2017-08-31

## 2017-08-31 NOTE — PROGRESS NOTES
[address], [facility]  [address] [facility] [address] [facility] [address]  Dept: [phone]     Date:   2017    Patient:  Scott Lisa  :      1975  MRN:      [id number]    Chief Complaint: hydrocortisone 1 % External Cream Apply topically 2 (two) times daily. Disp:  Rfl:    Clobetasol Propionate 0.05 % External Solution Apply 1 mL topically 2 (two) times daily.  Disp: 50 mL Rfl: 6   Mometasone Furoate 0.1 % External Cream Apply 1 Applicatio Journal?: yes   · Patient is reading nutrition labels? yes  · Average Caloric Intake:     · Average CHO Intake: 100  · Is patient exercising? yes  · Type of exercise?  ADL's    Eating Habits  · Patient states the following:  · Eats 3 meal(s) per day  · Harry cyanosis or edema  Pulses: 2+ and symmetric  Skin: Skin color, texture, turgor normal. No rashes or lesions    ASSESSMENT     OBSTRUCTIVE SLEEP APNEA: The patient states her sleep apnea has been stable since the last clinic visit.  There has not been any in

## 2017-09-01 NOTE — PROGRESS NOTES
Nutrition Assessment    Elier Cash is a 43year old female. Referred by:  Attending  Referring Physician Name: Maria Luz Aparicio MD    Assessment     Medical Nutrition Therapy Comment: Morbid Obesity  Visit Information: Follow-up Visit 8/31/17    ANT meals away from home (per week): 4  Comment:  Eduardo Hernandez reports changing diet following an appointment with Dr. Irene Zuniga to reduce carbohydrate intake to 100 grams per day. She is tracking her intake on myAMResortspal daily and RD was able to review food log.  Ca insurance coverage and will need a new order with Diagnosis of Morbid Obesity for RD to continue seeing Patient when Insurance has changed.     Assessment of Ability/Barriers     Patient and/or Family Will: Verbalize Understanding    Patient and/or Family A

## 2017-09-05 ENCOUNTER — TELEPHONE (OUTPATIENT)
Dept: OBGYN CLINIC | Facility: CLINIC | Age: 42
End: 2017-09-05

## 2017-09-05 DIAGNOSIS — Z32.00 PREGNANCY EXAMINATION OR TEST, PREGNANCY UNCONFIRMED: Primary | ICD-10-CM

## 2017-09-13 ENCOUNTER — TELEPHONE (OUTPATIENT)
Dept: SURGERY | Facility: CLINIC | Age: 42
End: 2017-09-13

## 2017-09-13 NOTE — TELEPHONE ENCOUNTER
9/13/17 @ 11:00am Patient is in process this week of getting [facility] as a New member as she just recently lost [facility] coverage altogether. As of today she is Self Pay. She will call with new insurance info asap.

## 2017-09-26 ENCOUNTER — OFFICE VISIT (OUTPATIENT)
Dept: OBGYN CLINIC | Facility: CLINIC | Age: 42
End: 2017-09-26

## 2017-09-26 VITALS
WEIGHT: 259 LBS | DIASTOLIC BLOOD PRESSURE: 75 MMHG | HEART RATE: 75 BPM | SYSTOLIC BLOOD PRESSURE: 112 MMHG | BODY MASS INDEX: 53 KG/M2

## 2017-09-26 DIAGNOSIS — R87.610 ASCUS WITH POSITIVE HIGH RISK HPV CERVICAL: ICD-10-CM

## 2017-09-26 DIAGNOSIS — R87.810 ASCUS WITH POSITIVE HIGH RISK HPV CERVICAL: ICD-10-CM

## 2017-09-26 DIAGNOSIS — Z32.00 PREGNANCY EXAMINATION OR TEST, PREGNANCY UNCONFIRMED: Primary | ICD-10-CM

## 2017-09-26 LAB
CONTROL LINE PRESENT WITH A CLEAR BACKGROUND (YES/NO): YES YES/NO
KIT LOT #: NORMAL NUMERIC

## 2017-09-26 PROCEDURE — 81025 URINE PREGNANCY TEST: CPT | Performed by: OBSTETRICS & GYNECOLOGY

## 2017-09-26 PROCEDURE — 57454 BX/CURETT OF CERVIX W/SCOPE: CPT | Performed by: OBSTETRICS & GYNECOLOGY

## 2017-09-27 NOTE — PROCEDURES
Colpo w/Cx Biopsy and ECC    Pregnancy Results: negative from urine test   Birth control method(s) used: not active    Consent signed. Procedure discussed with patient in detail including indication, risk, benefits, alternatives and complications.     In

## 2017-09-30 ENCOUNTER — TELEPHONE (OUTPATIENT)
Dept: OBGYN CLINIC | Facility: CLINIC | Age: 42
End: 2017-09-30

## 2017-09-30 NOTE — TELEPHONE ENCOUNTER
----- Message from Santi Hagen MD sent at 9/29/2017 10:47 AM CDT -----  Cervical bx-- EUN 1. Repeat pap/HPV in 1 year.

## 2017-10-11 ENCOUNTER — HOSPITAL ENCOUNTER (EMERGENCY)
Facility: HOSPITAL | Age: 42
Discharge: HOME | End: 2017-10-11
Attending: EMERGENCY MEDICINE

## 2017-10-11 ENCOUNTER — TELEPHONE (OUTPATIENT)
Dept: OBGYN CLINIC | Facility: CLINIC | Age: 42
End: 2017-10-11

## 2017-10-11 VITALS
TEMPERATURE: 98 F | WEIGHT: 248 LBS | RESPIRATION RATE: 20 BRPM | BODY MASS INDEX: 48.69 KG/M2 | SYSTOLIC BLOOD PRESSURE: 133 MMHG | HEIGHT: 60 IN | DIASTOLIC BLOOD PRESSURE: 79 MMHG | OXYGEN SATURATION: 100 % | HEART RATE: 76 BPM

## 2017-10-11 DIAGNOSIS — N61.0 MASTITIS: Primary | ICD-10-CM

## 2017-10-11 PROCEDURE — 99283 EMERGENCY DEPT VISIT LOW MDM: CPT

## 2017-10-11 RX ORDER — CEPHALEXIN 500 MG/1
500 CAPSULE ORAL 4 TIMES DAILY
Qty: 28 CAPSULE | Refills: 0 | Status: SHIPPED | OUTPATIENT
Start: 2017-10-11 | End: 2017-10-18

## 2017-10-11 NOTE — ED INITIAL ASSESSMENT (HPI)
Patient reports having a non cancerous bump to right sided chest, has had 2 mammograms and told it was okay.  Patient reports change in shape and size since Monday

## 2017-10-11 NOTE — TELEPHONE ENCOUNTER
Pt states a small pea sized breast cyst was found 2 years ago and although bothersome was told after a mammo she does not need to do anything with it. Pt saw Joy Brunner [id number] a few weeks ago and since then it seems like it got a bit bigger.  Yesterday it elongated in si

## 2017-10-11 NOTE — TELEPHONE ENCOUNTER
MASS UNDER BREAST INCREASED AND CHANGE SHAPE, SORE TO TOUCH AND BURNING. PT NO LONGER HAS INS.  PL ADV

## 2017-10-12 NOTE — ED PROVIDER NOTES
Patient Seen in: [facility] AND [facility] Emergency Department    History   Patient presents with:  Rash Skin Problem (integumentary)    Stated Complaint: Mass on left breast since monday    HPI    45-year-old female with history of asthma, depression, migraine HENT: Negative for congestion, ear pain and sore throat. Eyes: Negative for pain, discharge and redness. Respiratory: Negative for cough, shortness of breath and wheezing. Cardiovascular: Negative for chest pain.    Gastrointestinal: Negative for ab Abdominal: Soft. She exhibits no distension. There is no tenderness. There is no guarding. Musculoskeletal: Normal range of motion. She exhibits no tenderness. Neurological: She is alert and oriented to person, place, and time.    5/5 strength in b/l UE - pt  comfortable with d/c at this time, will d/c pt home now with Rx for keflex, pt to f/u with Dr. Tiffanie Weber in 2 days or return to ED sooner if symptoms worsen including fevers, purulent drainage, worsening pain, pt expresses understanding and agrees to d/

## 2017-10-12 NOTE — ED NOTES
Pt reported noticing a lump on r breast area 2y ago. Pt was checked for that. pt reported that this lump increased recently. Pt says that she is due for a mammogram on February. pt c/o burning  And itching sensation in that area.  Denied temp

## 2017-10-13 ENCOUNTER — TELEPHONE (OUTPATIENT)
Dept: OBGYN CLINIC | Facility: CLINIC | Age: 42
End: 2017-10-13

## 2017-10-13 NOTE — TELEPHONE ENCOUNTER
Ob/gyn do not do breast surgeries-- gen surgery does them.   Can see Dr Jacky Torres if she wants to stay with [facility] or Dr Denia Hines

## 2017-10-13 NOTE — TELEPHONE ENCOUNTER
Pt informed of JLKs recs and verbalized understanding. Pt provided with #s to both Dr. Jerri Tripp and Dr. Geraldine Riojas. Pt informed that Jerri Tripp is male and Geraldine Riojas is female.

## 2017-10-13 NOTE — TELEPHONE ENCOUNTER
Pt calling to report that she was seen in the ER on 10/11 for mass on left breast and breast pain. Pt stated that over the last 2 years she has known she had a cyst in left breast but was always told that \"it was not concerning\".  Pt stated that on 10/11,

## 2017-10-13 NOTE — TELEPHONE ENCOUNTER
Pt was seen at the er 2 days ago, dx mastitis, needs surgery, pt would like to know if JLK can do the surgery or needs to see another md ?

## 2017-10-17 ENCOUNTER — OFFICE VISIT (OUTPATIENT)
Dept: SURGERY | Facility: CLINIC | Age: 42
End: 2017-10-17

## 2017-10-17 VITALS
BODY MASS INDEX: 48.69 KG/M2 | HEIGHT: 60 IN | WEIGHT: 248 LBS | TEMPERATURE: 98 F | RESPIRATION RATE: 16 BRPM | SYSTOLIC BLOOD PRESSURE: 118 MMHG | DIASTOLIC BLOOD PRESSURE: 72 MMHG | HEART RATE: 84 BPM

## 2017-10-17 DIAGNOSIS — N61.1 ABSCESS OF SKIN OF BREAST: Primary | ICD-10-CM

## 2017-10-17 PROCEDURE — 99244 OFF/OP CNSLTJ NEW/EST MOD 40: CPT | Performed by: SURGERY

## 2017-10-17 PROCEDURE — 99212 OFFICE O/P EST SF 10 MIN: CPT | Performed by: SURGERY

## 2017-10-17 PROCEDURE — 10060 I&D ABSCESS SIMPLE/SINGLE: CPT | Performed by: SURGERY

## 2017-10-17 NOTE — H&P
History and Physical      Claudeen Maxin is a 43year old female. HPI   Patient presents with:  Mass: Patient referred by PCP Dr. Jf Eli for right breast mass. Patient states she first noticed 2 years ago.  Mass has increased in size, states it is t mouth daily. Disp:  Rfl:    Multiple Vitamins-Minerals (MULTI-VITAMIN/MINERALS) Oral Tab Take 1 tablet by mouth daily.  Disp:  Rfl:    HYDROCHLOROTHIAZIDE 12.5 MG Oral Cap TAKE 1 CAPSULE(12.5 MG) BY MOUTH DAILY Disp: 30 capsule Rfl: 0   Mometasone Furoate 0 Body mass index is 48.43 kg/m². Patient's last menstrual period was 10/04/2017 (exact date).   Constitutional: appears well hydrated alert and responsive no acute distress noted  Head/Face: normocephalic  Nose/Mouth/Throat: nose and throat are clear pal

## 2017-10-18 ENCOUNTER — TELEPHONE (OUTPATIENT)
Dept: SURGERY | Facility: CLINIC | Age: 42
End: 2017-10-18

## 2017-10-18 NOTE — TELEPHONE ENCOUNTER
pt called, she is almost out of antibiotics. She asked if she still needs to take this. Please advise.

## 2017-10-18 NOTE — TELEPHONE ENCOUNTER
Contacted patient. She is to complete antibiotic course as directed, no further antibiotics needed at this time. She verbalized understanding and all questions answered.

## 2017-10-30 ENCOUNTER — OFFICE VISIT (OUTPATIENT)
Dept: SURGERY | Facility: CLINIC | Age: 42
End: 2017-10-30

## 2017-10-30 DIAGNOSIS — N60.81 CYST OF SKIN OF RIGHT BREAST: Primary | ICD-10-CM

## 2017-10-30 PROCEDURE — 99214 OFFICE O/P EST MOD 30 MIN: CPT | Performed by: SURGERY

## 2017-10-30 PROCEDURE — 99212 OFFICE O/P EST SF 10 MIN: CPT | Performed by: SURGERY

## 2017-10-30 NOTE — H&P
History and Physical      Danielle Chappell is a 43year old female. HPI   Patient presents with:  Abscess: Pt here for check up s/p I&D of right breast mass on 10/17/17. Pt states she removed packing last week and cont. to keep area covered.   Pt stat 2000 UNITS Oral Tab Take by mouth daily. Disp:  Rfl:    Calcium Carbonate-Vit D-Min (CALCIUM 1200 OR) Take by mouth daily. Disp:  Rfl:    Multiple Vitamins-Minerals (MULTI-VITAMIN/MINERALS) Oral Tab Take 1 tablet by mouth daily.  Disp:  Rfl:        ALLERGIE noted  Head/Face: normocephalic  Nose/Mouth/Throat: nose and throat are clear palate is intact mucous membranes are moist no oral lesions are noted  Neck/Thyroid: neck is supple without adenopathy  Respiratory: normal to inspection lungs are clear to auscu

## 2017-11-02 ENCOUNTER — OFFICE VISIT (OUTPATIENT)
Dept: SURGERY | Facility: CLINIC | Age: 42
End: 2017-11-02

## 2017-11-02 VITALS
OXYGEN SATURATION: 99 % | SYSTOLIC BLOOD PRESSURE: 122 MMHG | DIASTOLIC BLOOD PRESSURE: 80 MMHG | HEIGHT: 58.5 IN | RESPIRATION RATE: 16 BRPM | HEART RATE: 82 BPM | WEIGHT: 250 LBS | BODY MASS INDEX: 51.08 KG/M2

## 2017-11-02 DIAGNOSIS — E66.01 MORBID OBESITY WITH BMI OF 50.0-59.9, ADULT (HCC): ICD-10-CM

## 2017-11-02 DIAGNOSIS — B37.2 CANDIDAL INTERTRIGO: ICD-10-CM

## 2017-11-02 DIAGNOSIS — Z51.81 ENCOUNTER FOR THERAPEUTIC DRUG MONITORING: ICD-10-CM

## 2017-11-02 DIAGNOSIS — G47.33 OSA ON CPAP: Primary | ICD-10-CM

## 2017-11-02 DIAGNOSIS — Z99.89 OSA ON CPAP: Primary | ICD-10-CM

## 2017-11-02 DIAGNOSIS — R63.2 BINGE EATING: ICD-10-CM

## 2017-11-02 DIAGNOSIS — M17.0 PRIMARY OSTEOARTHRITIS OF BOTH KNEES: ICD-10-CM

## 2017-11-02 PROCEDURE — 99214 OFFICE O/P EST MOD 30 MIN: CPT | Performed by: INTERNAL MEDICINE

## 2017-11-02 RX ORDER — NYSTATIN 100000 [USP'U]/G
1 POWDER TOPICAL 4 TIMES DAILY
Qty: 30 G | Refills: 1 | Status: SHIPPED | OUTPATIENT
Start: 2017-11-02 | End: 2018-08-01

## 2017-11-02 RX ORDER — PHENTERMINE HYDROCHLORIDE 37.5 MG/1
37.5 TABLET ORAL
Qty: 30 TABLET | Refills: 2 | Status: SHIPPED | OUTPATIENT
Start: 2017-11-02 | End: 2018-01-22

## 2017-11-02 NOTE — PROGRESS NOTES
[address], [facility]  [address] [facility] 91 [facility] [address]  Dept: [phone]     Date:   2017    Patient:  Noe Darnell  :      1975  MRN:      [id number]    Chief Complaint: mL topically 2 (two) times daily. Disp: 50 mL Rfl: 6   Mometasone Furoate 0.1 % External Cream Apply 1 Application topically daily. Apply a thin film to the affected area(s). Disp: 45 g Rfl: 0   Rizatriptan Benzoate 10 MG Oral Tab as needed.    Disp:  Rfl: Intake: 100  · Is patient exercising?  yes  · Type of exercise? walking    Eating Habits  · Patient states the following:  · Eats 3 meal(s) per day  · Length of time it takes to consume a meal:  20  · # of snacks per day: 1 Type of snacks:  fruit  · Amount lesions    ASSESSMENT     OBSTRUCTIVE SLEEP APNEA: The patient states her sleep apnea has been stable since the last clinic visit. There has not been any increase in hyper-somnolence.        Encounter Diagnosis(ses):   Ashu on cpap  (primary encounter diagno

## 2017-11-07 ENCOUNTER — HOSPITAL ENCOUNTER (OUTPATIENT)
Dept: MAMMOGRAPHY | Age: 42
Discharge: HOME | End: 2017-11-07
Attending: OBSTETRICS & GYNECOLOGY
Payer: MEDICAID

## 2017-11-07 DIAGNOSIS — Z12.31 ENCOUNTER FOR SCREENING MAMMOGRAM FOR BREAST CANCER: ICD-10-CM

## 2017-11-07 PROCEDURE — 77067 SCR MAMMO BI INCL CAD: CPT | Performed by: OBSTETRICS & GYNECOLOGY

## 2017-11-27 ENCOUNTER — TELEPHONE (OUTPATIENT)
Dept: SURGERY | Facility: CLINIC | Age: 42
End: 2017-11-27

## 2017-11-27 NOTE — TELEPHONE ENCOUNTER
VANESA, asking patient to confirm she is ok with local anesthesia, Dr. Donna Olivier also wanted to offer patient MAC anesthesia for procedure on 12/06/2017. Asked patient to call back to confirm when able. CB number given.

## 2017-12-06 ENCOUNTER — HOSPITAL ENCOUNTER (OUTPATIENT)
Facility: HOSPITAL | Age: 42
Setting detail: HOSPITAL OUTPATIENT SURGERY
Discharge: HOME | End: 2017-12-06
Attending: SURGERY | Admitting: SURGERY
Payer: MEDICAID

## 2017-12-06 ENCOUNTER — SURGERY (OUTPATIENT)
Age: 42
End: 2017-12-06

## 2017-12-06 VITALS — OXYGEN SATURATION: 100 % | HEART RATE: 86 BPM | SYSTOLIC BLOOD PRESSURE: 132 MMHG | DIASTOLIC BLOOD PRESSURE: 83 MMHG

## 2017-12-06 PROCEDURE — 0HBTXZZ EXCISION OF RIGHT BREAST, EXTERNAL APPROACH: ICD-10-PCS | Performed by: SURGERY

## 2017-12-06 PROCEDURE — 0HQTXZZ REPAIR RIGHT BREAST, EXTERNAL APPROACH: ICD-10-PCS | Performed by: SURGERY

## 2017-12-06 PROCEDURE — 11402 EXC TR-EXT B9+MARG 1.1-2 CM: CPT | Performed by: SURGERY

## 2017-12-06 RX ORDER — BUPIVACAINE HYDROCHLORIDE AND EPINEPHRINE 5; 5 MG/ML; UG/ML
INJECTION, SOLUTION PERINEURAL AS NEEDED
Status: DISCONTINUED | OUTPATIENT
Start: 2017-12-06 | End: 2017-12-06

## 2017-12-07 NOTE — OPERATIVE REPORT
[facility]    PATIENT'S NAME: Mikalnahum Rosa Maria   ATTENDING PHYSICIAN: Phillip Selby MD   OPERATING PHYSICIAN: Phillip Selby MD   PATIENT ACCOUNT#:   [id number]    LOCATION:  [facility] 3 [facility] 10  MEDICAL RECORD #:   [id number]       DATE Josue Selby the recovery area in stable condition.       Dictated By Myles Garay MD  d: 12/06/2017 18:34:33  t: 12/06/2017 21:28:45  Job [id number]  BEM/

## 2017-12-07 NOTE — BRIEF OP NOTE
Pre-Operative Diagnosis: skin cyst right breast     Post-Operative Diagnosis: skin cyst right breast     Procedure Performed:   Procedure(s):  excisional biopsy right breast skin cyst    Surgeon(s) and Role:     Carol Ann Dos Santos MD - Primary    [place]

## 2017-12-07 NOTE — INTERVAL H&P NOTE
Pre-op Diagnosis: skin cyst right breast    The above referenced H&P was reviewed by Roman Sanford MD on 12/6/2017, the patient was examined and no significant changes have occurred in the patient's condition since the H&P was performed.   I discussed with

## 2017-12-07 NOTE — H&P
History and Physical        Zohra Diggs is a 43year old female.        HPI   Patient presents with:  Abscess: Pt here for check up s/p I&D of right breast mass on 10/17/17. Pt states she removed packing last week and cont. to keep area covered.   Pt Cholecalciferol (VITAMIN D3) 2000 UNITS Oral Tab Take by mouth daily. Disp:  Rfl:    Calcium Carbonate-Vit D-Min (CALCIUM 1200 OR) Take by mouth daily.  Disp:  Rfl:    Multiple Vitamins-Minerals (MULTI-VITAMIN/MINERALS) Oral Tab Take 1 tablet by mouth teodora appears well hydrated alert and responsive no acute distress noted  Head/Face: normocephalic  Nose/Mouth/Throat: nose and throat are clear palate is intact mucous membranes are moist no oral lesions are noted  Neck/Thyroid: neck is supple without adenopath

## 2017-12-15 ENCOUNTER — TELEPHONE (OUTPATIENT)
Dept: SURGERY | Facility: CLINIC | Age: 42
End: 2017-12-15

## 2017-12-15 NOTE — TELEPHONE ENCOUNTER
Notes Recorded by Kiki Lawrence MD on 12/8/2017 at 6:05 PM CST  Please notify pt of pathology result: benign inflammation. Contacted patient. Notified her of above. She verbalized understanding and all questions were answered.

## 2017-12-21 ENCOUNTER — OFFICE VISIT (OUTPATIENT)
Dept: SURGERY | Facility: CLINIC | Age: 42
End: 2017-12-21

## 2017-12-21 DIAGNOSIS — N60.81 CYST OF SKIN OF RIGHT BREAST: Primary | ICD-10-CM

## 2017-12-21 PROCEDURE — 99024 POSTOP FOLLOW-UP VISIT: CPT | Performed by: SURGERY

## 2017-12-21 PROCEDURE — 99212 OFFICE O/P EST SF 10 MIN: CPT | Performed by: SURGERY

## 2017-12-25 NOTE — PROGRESS NOTES
Postoperative Patient Follow-up      12/25/2017    Katt Batista 43year old      HPI  Patient presents with:  Post-Op: Excisional biopsy of right breast skin cyst, layered closure 12/6/17.   Incision site healing, no redness or swelling, no drainage, no

## 2018-01-22 ENCOUNTER — OFFICE VISIT (OUTPATIENT)
Dept: SURGERY | Facility: CLINIC | Age: 43
End: 2018-01-22

## 2018-01-22 VITALS
HEIGHT: 58.5 IN | SYSTOLIC BLOOD PRESSURE: 117 MMHG | HEART RATE: 82 BPM | WEIGHT: 261.06 LBS | OXYGEN SATURATION: 99 % | BODY MASS INDEX: 53.34 KG/M2 | DIASTOLIC BLOOD PRESSURE: 83 MMHG | RESPIRATION RATE: 16 BRPM

## 2018-01-22 DIAGNOSIS — M17.0 PRIMARY OSTEOARTHRITIS OF BOTH KNEES: ICD-10-CM

## 2018-01-22 DIAGNOSIS — Z51.81 ENCOUNTER FOR THERAPEUTIC DRUG MONITORING: ICD-10-CM

## 2018-01-22 DIAGNOSIS — E66.01 MORBID OBESITY WITH BMI OF 50.0-59.9, ADULT (HCC): ICD-10-CM

## 2018-01-22 DIAGNOSIS — G47.33 OSA ON CPAP: Primary | ICD-10-CM

## 2018-01-22 DIAGNOSIS — B37.2 CANDIDAL INTERTRIGO: ICD-10-CM

## 2018-01-22 DIAGNOSIS — F43.9 STRESS: ICD-10-CM

## 2018-01-22 DIAGNOSIS — R63.2 BINGE EATING: ICD-10-CM

## 2018-01-22 DIAGNOSIS — R60.0 LOWER EXTREMITY EDEMA: ICD-10-CM

## 2018-01-22 DIAGNOSIS — Z99.89 OSA ON CPAP: Primary | ICD-10-CM

## 2018-01-22 PROCEDURE — 99214 OFFICE O/P EST MOD 30 MIN: CPT | Performed by: INTERNAL MEDICINE

## 2018-01-22 RX ORDER — PHENTERMINE HYDROCHLORIDE 37.5 MG/1
37.5 TABLET ORAL
Qty: 30 TABLET | Refills: 2 | Status: SHIPPED | OUTPATIENT
Start: 2018-01-22 | End: 2018-08-01 | Stop reason: ALTCHOICE

## 2018-01-22 RX ORDER — SERTRALINE HYDROCHLORIDE 100 MG/1
100 TABLET, FILM COATED ORAL DAILY
Qty: 90 TABLET | Refills: 1 | Status: SHIPPED | OUTPATIENT
Start: 2018-01-22 | End: 2018-05-21

## 2018-01-22 NOTE — PROGRESS NOTES
[address], [facility] ([place], [place])  181 [facility] 91 [facility] [address]  Dept: [phone]     Date:   2017    Patient:  Josee Paulson  :      1975  MRN:      [id number]    Chief Complaint: times daily. Disp: 50 mL Rfl: 6   Mometasone Furoate 0.1 % External Cream Apply 1 Application topically daily. Apply a thin film to the affected area(s). Disp: 45 g Rfl: 0   Rizatriptan Benzoate 10 MG Oral Tab as needed.    Disp:  Rfl: 5   Docosahexaenoic A yes  · Average Caloric Intake:     · Average CHO Intake: 100  · Is patient exercising?  yes  · Type of exercise? walking    Eating Habits  · Patient states the following:  · Eats 3 meal(s) per day  · Length of time it takes to consume a meal:  20  · # of sn color, texture, turgor normal. No rashes or lesions    ASSESSMENT     OBSTRUCTIVE SLEEP APNEA: The patient states her sleep apnea has been stable since the last clinic visit. There has not been any increase in hyper-somnolence.        Encounter Diagnosis(se

## 2018-01-30 ENCOUNTER — OFFICE VISIT (OUTPATIENT)
Dept: INTERNAL MEDICINE CLINIC | Facility: CLINIC | Age: 43
End: 2018-01-30

## 2018-01-30 VITALS
HEIGHT: 58.5 IN | WEIGHT: 257 LBS | BODY MASS INDEX: 52.51 KG/M2 | SYSTOLIC BLOOD PRESSURE: 126 MMHG | DIASTOLIC BLOOD PRESSURE: 86 MMHG | TEMPERATURE: 98 F | HEART RATE: 92 BPM

## 2018-01-30 DIAGNOSIS — G47.33 OSA ON CPAP: ICD-10-CM

## 2018-01-30 DIAGNOSIS — N60.81 CYST OF SKIN OF RIGHT BREAST: ICD-10-CM

## 2018-01-30 DIAGNOSIS — L65.9 HAIR LOSS: ICD-10-CM

## 2018-01-30 DIAGNOSIS — Z99.89 OSA ON CPAP: ICD-10-CM

## 2018-01-30 DIAGNOSIS — H54.7 VISUAL PROBLEMS: ICD-10-CM

## 2018-01-30 DIAGNOSIS — E66.01 MORBID OBESITY (HCC): Primary | ICD-10-CM

## 2018-01-30 PROCEDURE — 90686 IIV4 VACC NO PRSV 0.5 ML IM: CPT | Performed by: INTERNAL MEDICINE

## 2018-01-30 PROCEDURE — 99214 OFFICE O/P EST MOD 30 MIN: CPT | Performed by: INTERNAL MEDICINE

## 2018-01-30 PROCEDURE — 90471 IMMUNIZATION ADMIN: CPT | Performed by: INTERNAL MEDICINE

## 2018-02-11 NOTE — PROGRESS NOTES
HPI:    Patient ID: Navdeep Garcia is a 43year old female.     HPI     Follow up wound is healing well  No drainadand for excsion of  Cyst right breast  Skin    /86 (BP Location: Left arm, Patient Position: Sitting, Cuff Size: large)   Pulse 92   T times daily. Disp:  Rfl:    Clobetasol Propionate 0.05 % External Solution Apply 1 mL topically 2 (two) times daily. Disp: 50 mL Rfl: 6   Mometasone Furoate 0.1 % External Cream Apply 1 Application topically daily.  Apply a thin film to the affected area(s) and EOM are normal. Pupils are equal, round, and reactive to light. Right eye exhibits no discharge. Left eye exhibits no discharge. No scleral icterus. Neck: Neck supple. No thyromegaly present.    Cardiovascular: Normal rate, regular rhythm, normal hear Platelet;  No Differential      Comp Metabolic Panel (14)      Flulaval 0.5 ml 6 mon and older Quad single dose PF (60321)    Meds This Visit:  No prescriptions requested or ordered in this encounter    Imaging & Referrals:  Demetrio Haywood

## 2018-03-16 ENCOUNTER — OFFICE VISIT (OUTPATIENT)
Dept: SURGERY | Facility: CLINIC | Age: 43
End: 2018-03-16

## 2018-03-16 VITALS
WEIGHT: 266.81 LBS | BODY MASS INDEX: 54.51 KG/M2 | DIASTOLIC BLOOD PRESSURE: 86 MMHG | HEIGHT: 58.5 IN | SYSTOLIC BLOOD PRESSURE: 123 MMHG | HEART RATE: 76 BPM | OXYGEN SATURATION: 98 %

## 2018-03-16 DIAGNOSIS — E66.01 MORBID OBESITY WITH BMI OF 50.0-59.9, ADULT (HCC): Primary | ICD-10-CM

## 2018-03-16 PROCEDURE — 99243 OFF/OP CNSLTJ NEW/EST LOW 30: CPT | Performed by: SURGERY

## 2018-03-16 RX ORDER — PHENTERMINE HYDROCHLORIDE 37.5 MG/1
TABLET ORAL
Qty: 30 TABLET | Refills: 2 | OUTPATIENT
Start: 2018-03-16

## 2018-03-16 NOTE — CONSULTS
New Patient Consultation    Chief Complaint: morbid obesity, intertrigo  History of Present Illness:   Zohra Diggs is a 43year old female referred by Dr. Aguilar Holden to discuss post weight loss body contouring.   The patient has a BMI of 54 and over the la performed on the intake sheet.   The patient reports see HPI, visual changes, nighttime urine, irregular menses, migraines, sleep disturbance, anxiety  General:   The patient denies, fever, chills, night sweats, fatigue, generalized weakness, change in appe pelvic pain, pain with intercourse, painful menses, or pregnancy  Musculoskeletal:  The patient denies muscle aches/pain, joint pain, stiff joints, neck pain, back pain or bone pain.   Neurologic:  There is no history of migraines or severe headaches, seizu any body contouring procedures. The patient notes that and is contemplating surgical options for weight loss versus other options.   She is in treatment with Dr. Jaswinder Winters and she is not a surgical candidate for any type of plastic surgery procedure at this p

## 2018-04-24 NOTE — TELEPHONE ENCOUNTER
Patient returned call, confirmed she would like to stay with local anesthesia as scheduled. All questions answered. How Severe Is Your Skin Lesion?: mild Has Your Skin Lesion Been Treated?: not been treated Is This A New Presentation, Or A Follow-Up?: Skin Lesion

## 2018-05-08 ENCOUNTER — TELEPHONE (OUTPATIENT)
Dept: INTERNAL MEDICINE CLINIC | Facility: CLINIC | Age: 43
End: 2018-05-08

## 2018-05-08 DIAGNOSIS — G47.33 OSA (OBSTRUCTIVE SLEEP APNEA): Primary | ICD-10-CM

## 2018-05-08 NOTE — TELEPHONE ENCOUNTER
[facility] faxed a request for pt's new CPAP supplies. Please sign referral if you agree.  Thank you, [facility]

## 2018-05-16 ENCOUNTER — OFFICE VISIT (OUTPATIENT)
Dept: OPHTHALMOLOGY | Facility: CLINIC | Age: 43
End: 2018-05-16

## 2018-05-16 DIAGNOSIS — H52.4 PRESBYOPIA OF BOTH EYES: Primary | ICD-10-CM

## 2018-05-16 DIAGNOSIS — H18.529 MAP-DOT-FINGERPRINT CORNEAL DYSTROPHY: ICD-10-CM

## 2018-05-16 PROCEDURE — 99212 OFFICE O/P EST SF 10 MIN: CPT | Performed by: OPHTHALMOLOGY

## 2018-05-16 PROCEDURE — 99242 OFF/OP CONSLTJ NEW/EST SF 20: CPT | Performed by: OPHTHALMOLOGY

## 2018-05-16 NOTE — PROGRESS NOTES
Glenn Mckinney is a 43year old female. HPI:     HPI     Consult    Additional comments: Referred by Regan Latif           Comments   Pt is here to rule out Sjogrens. Pt complains of blurry vision that started 2.5 years ago.  Pt is straining to r tablet Rfl: 2   Nystatin 354928 UNIT/GM External Powder Apply 1 Application topically 4 (four) times daily.  Apply to affected areas Disp: 30 g Rfl: 1   HYDROCHLOROTHIAZIDE 12.5 MG Oral Cap TAKE 1 CAPSULE(12.5 MG) BY MOUTH DAILY Disp: 30 capsule Rfl: 0   KE rim Good rim    C/D Ratio 0.2 0.2            Lacrimal Exam     Schirmers       Right Left     13 15            Refraction     Manifest Refraction (Auto)       Sphere Cylinder Axis    Right +0.25 Sphere     Left T[place]ley +0.25 045    Advised pt to use +1.00 OT

## 2018-05-16 NOTE — PATIENT INSTRUCTIONS
Presbyopia of both eyes  OK for patient to use +1.00 or +1.25 OTC reading glasses. Map-dot-fingerprint corneal dystrophy  No treatment.

## 2018-06-14 ENCOUNTER — APPOINTMENT (OUTPATIENT)
Dept: LAB | Age: 43
End: 2018-06-14
Attending: INTERNAL MEDICINE
Payer: MEDICAID

## 2018-06-14 DIAGNOSIS — E66.01 MORBID OBESITY (HCC): ICD-10-CM

## 2018-06-14 PROCEDURE — 83036 HEMOGLOBIN GLYCOSYLATED A1C: CPT

## 2018-06-14 PROCEDURE — 80053 COMPREHEN METABOLIC PANEL: CPT

## 2018-06-14 PROCEDURE — 84439 ASSAY OF FREE THYROXINE: CPT

## 2018-06-14 PROCEDURE — 84443 ASSAY THYROID STIM HORMONE: CPT

## 2018-06-14 PROCEDURE — 36415 COLL VENOUS BLD VENIPUNCTURE: CPT

## 2018-06-14 PROCEDURE — 85027 COMPLETE CBC AUTOMATED: CPT

## 2018-06-14 PROCEDURE — 80061 LIPID PANEL: CPT

## 2018-08-01 ENCOUNTER — OFFICE VISIT (OUTPATIENT)
Dept: SURGERY | Facility: CLINIC | Age: 43
End: 2018-08-01
Payer: MEDICAID

## 2018-08-01 VITALS
DIASTOLIC BLOOD PRESSURE: 84 MMHG | HEART RATE: 82 BPM | OXYGEN SATURATION: 98 % | BODY MASS INDEX: 56.81 KG/M2 | SYSTOLIC BLOOD PRESSURE: 130 MMHG | HEIGHT: 58.5 IN | RESPIRATION RATE: 18 BRPM | WEIGHT: 278.06 LBS

## 2018-08-01 DIAGNOSIS — R63.2 BINGE EATING: ICD-10-CM

## 2018-08-01 DIAGNOSIS — B37.2 CANDIDAL INTERTRIGO: ICD-10-CM

## 2018-08-01 DIAGNOSIS — E66.01 MORBID OBESITY WITH BMI OF 50.0-59.9, ADULT (HCC): ICD-10-CM

## 2018-08-01 DIAGNOSIS — Z99.89 OSA ON CPAP: Primary | ICD-10-CM

## 2018-08-01 DIAGNOSIS — F43.9 STRESS: ICD-10-CM

## 2018-08-01 DIAGNOSIS — Z51.81 ENCOUNTER FOR THERAPEUTIC DRUG MONITORING: ICD-10-CM

## 2018-08-01 DIAGNOSIS — R60.0 LOWER EXTREMITY EDEMA: ICD-10-CM

## 2018-08-01 DIAGNOSIS — M17.0 PRIMARY OSTEOARTHRITIS OF BOTH KNEES: ICD-10-CM

## 2018-08-01 DIAGNOSIS — G47.33 OSA ON CPAP: Primary | ICD-10-CM

## 2018-08-01 PROCEDURE — 99214 OFFICE O/P EST MOD 30 MIN: CPT | Performed by: INTERNAL MEDICINE

## 2018-08-01 RX ORDER — HYDROCHLOROTHIAZIDE 12.5 MG/1
CAPSULE, GELATIN COATED ORAL
Qty: 30 CAPSULE | Refills: 2 | Status: SHIPPED | OUTPATIENT
Start: 2018-08-01 | End: 2018-08-01

## 2018-08-01 RX ORDER — SERTRALINE HYDROCHLORIDE 100 MG/1
100 TABLET, FILM COATED ORAL DAILY
Qty: 90 TABLET | Refills: 1 | Status: SHIPPED | OUTPATIENT
Start: 2018-08-01 | End: 2019-12-10

## 2018-08-01 RX ORDER — PHENTERMINE HYDROCHLORIDE 37.5 MG/1
37.5 TABLET ORAL
Qty: 30 TABLET | Refills: 1 | Status: SHIPPED | OUTPATIENT
Start: 2018-08-01 | End: 2019-02-06

## 2018-08-01 RX ORDER — NYSTATIN 100000 [USP'U]/G
1 POWDER TOPICAL 4 TIMES DAILY
Qty: 30 G | Refills: 1 | Status: SHIPPED | OUTPATIENT
Start: 2018-08-01

## 2018-08-01 RX ORDER — HYDROCHLOROTHIAZIDE 12.5 MG/1
CAPSULE, GELATIN COATED ORAL
Qty: 30 CAPSULE | Refills: 2 | Status: SHIPPED | OUTPATIENT
Start: 2018-08-01 | End: 2019-02-06

## 2018-08-01 NOTE — PROGRESS NOTES
[address], [facility]  [address] [facility] 91 [facility] [address]  Dept: [phone]     Date:   2017    Patient:  Navdeep Garcia  :      1975  MRN:      [id number]    Chief Complaint: Cream Apply topically 2 (two) times daily. Disp:  Rfl:    Mometasone Furoate 0.1 % External Cream Apply 1 Application topically daily. Apply a thin film to the affected area(s). Disp: 45 g Rfl: 0   Rizatriptan Benzoate 10 MG Oral Tab as needed.    Disp:  Rf Psychiatric Mother    • Cataracts Mother    • Macular degeneration Neg    • Glaucoma Neg    • Retinal detachment Neg        Food Journal  · Reviewed and Discussed:       · Patient has a Food Journal?: yes   · Patient is reading nutrition labels?   yes  · Av auscultation bilaterally  Heart: S1, S2 normal, no murmur, click, rub or gallop, regular rate and rhythm  Abdomen: soft, obese, large pannus  Extremities: extremities normal, atraumatic, no cyanosis or edema  Pulses: 2+ and symmetric  Skin: Skin color, maria antonia

## 2018-08-02 ENCOUNTER — TELEPHONE (OUTPATIENT)
Dept: SURGERY | Facility: CLINIC | Age: 43
End: 2018-08-02

## 2018-08-23 ENCOUNTER — NURSE TRIAGE (OUTPATIENT)
Dept: INTERNAL MEDICINE CLINIC | Facility: CLINIC | Age: 43
End: 2018-08-23

## 2018-08-23 ENCOUNTER — OFFICE VISIT (OUTPATIENT)
Dept: INTERNAL MEDICINE CLINIC | Facility: CLINIC | Age: 43
End: 2018-08-23
Payer: MEDICAID

## 2018-08-23 ENCOUNTER — APPOINTMENT (OUTPATIENT)
Dept: LAB | Age: 43
End: 2018-08-23
Attending: INTERNAL MEDICINE
Payer: MEDICAID

## 2018-08-23 VITALS
WEIGHT: 272 LBS | SYSTOLIC BLOOD PRESSURE: 110 MMHG | TEMPERATURE: 99 F | DIASTOLIC BLOOD PRESSURE: 75 MMHG | HEIGHT: 58.5 IN | BODY MASS INDEX: 55.57 KG/M2 | HEART RATE: 76 BPM

## 2018-08-23 DIAGNOSIS — R10.9 RIGHT FLANK PAIN: ICD-10-CM

## 2018-08-23 DIAGNOSIS — M54.50 ACUTE RIGHT-SIDED LOW BACK PAIN WITHOUT SCIATICA: ICD-10-CM

## 2018-08-23 DIAGNOSIS — M54.50 ACUTE RIGHT-SIDED LOW BACK PAIN WITHOUT SCIATICA: Primary | ICD-10-CM

## 2018-08-23 LAB
BILIRUB UR QL: NEGATIVE
CLARITY UR: CLEAR
COLOR UR: YELLOW
GLUCOSE UR-MCNC: NEGATIVE MG/DL
HGB UR QL STRIP.AUTO: NEGATIVE
KETONES UR-MCNC: NEGATIVE MG/DL
LEUKOCYTE ESTERASE UR QL STRIP.AUTO: NEGATIVE
NITRITE UR QL STRIP.AUTO: NEGATIVE
PH UR: 5 [PH] (ref 5–8)
PROT UR-MCNC: NEGATIVE MG/DL
SP GR UR STRIP: 1.02 (ref 1–1.03)
UROBILINOGEN UR STRIP-ACNC: <2
VIT C UR-MCNC: NEGATIVE MG/DL

## 2018-08-23 PROCEDURE — 99212 OFFICE O/P EST SF 10 MIN: CPT | Performed by: INTERNAL MEDICINE

## 2018-08-23 PROCEDURE — 81003 URINALYSIS AUTO W/O SCOPE: CPT

## 2018-08-23 PROCEDURE — 99213 OFFICE O/P EST LOW 20 MIN: CPT | Performed by: INTERNAL MEDICINE

## 2018-08-23 RX ORDER — METHYLPREDNISOLONE 4 MG/1
TABLET ORAL
Qty: 1 KIT | Refills: 0 | Status: SHIPPED | OUTPATIENT
Start: 2018-08-23 | End: 2018-09-28

## 2018-08-23 NOTE — PATIENT INSTRUCTIONS
Lumbar Extension (Flexibility)    1. Lie face down on your stomach, forehead on the floor. You can lie on a mat or towel. 2. Bend your arms next to your body and lift your upper body up on your forearms.  Your palms and forearms should be flat on the boris © 0490-0651 [employer] [address]. [address] [facility], [address] [place]. All rights reserved. This information is not intended as a substitute for professional medical care. Always follow your healthcare professional's instructions.         Lumbar Talk to your pediatrician regarding the use of this medicine in children. Special care may be needed. What side effects may I notice from receiving this medicine?   Side effects that you should report to your doctor or health care professional as soon as p · NSAIDs, medicines for pain inflammation, like ibuprofen or naproxen  · other medicines for myasthenia gravis  · rifampin  · vaccines  What if I miss a dose? If you miss a dose, take it as soon as you can.  If it is almost time for your next dose, talk to This medicine may affect blood sugar levels. If you have diabetes, check with your doctor or health care professional before you change your diet or the dose of your diabetic medicine.   Tell your doctor or health care professional right away if you have an

## 2018-08-23 NOTE — PROGRESS NOTES
Patient ID: Nathan Smith is a 37year old female. Patient presents with:  Back Pain: Per patient back pain right side radiating towards RUQ for a couple days. Patient arrived 12 minutes late for appointment.   HISTORY OF PRESENT ILLNESS:   HPI  P Rfl: 2  •  Insulin Pen Needle (BD PEN NEEDLE CHAS U/F) 32G X 4 MM Does not apply Misc, Use a new needle with each use, Disp: 30 each, Rfl: 3  •  Nystatin 193019 UNIT/GM External Powder, Apply 1 Application topically 4 (four) times daily.  Apply to affected PHYSICAL EXAM:      08/23/18  1534   BP: 110/75   Pulse: 76   Temp: 98.6 °F (37 °C)   TempSrc: Oral   Weight: 272 lb (123.4 kg)   Height: 4' 10.5\" (1.486 m)       Body mass index is 55.88 kg/m².     Physical Exam   Constitutional: She is orie

## 2018-08-23 NOTE — TELEPHONE ENCOUNTER
Action Requested: Summary for Provider     []  Critical Lab, Recommendations Needed  [] Need Additional Advice  []   FYI    []   Need Orders  [] Need Medications Sent to Pharmacy  []  Other     SUMMARY: Pt wanting appt today with available provider and evelio

## 2018-09-28 ENCOUNTER — OFFICE VISIT (OUTPATIENT)
Dept: INTERNAL MEDICINE CLINIC | Facility: CLINIC | Age: 43
End: 2018-09-28
Payer: MEDICAID

## 2018-09-28 VITALS
WEIGHT: 282 LBS | DIASTOLIC BLOOD PRESSURE: 80 MMHG | SYSTOLIC BLOOD PRESSURE: 117 MMHG | BODY MASS INDEX: 57.61 KG/M2 | HEIGHT: 58.5 IN | TEMPERATURE: 98 F | HEART RATE: 80 BPM

## 2018-09-28 DIAGNOSIS — G47.33 OSA (OBSTRUCTIVE SLEEP APNEA): ICD-10-CM

## 2018-09-28 DIAGNOSIS — Z00.00 ROUTINE GENERAL MEDICAL EXAMINATION AT A HEALTH CARE FACILITY: Primary | ICD-10-CM

## 2018-09-28 DIAGNOSIS — E66.01 MORBID OBESITY (HCC): ICD-10-CM

## 2018-09-28 DIAGNOSIS — Z12.31 VISIT FOR SCREENING MAMMOGRAM: ICD-10-CM

## 2018-09-28 PROCEDURE — 90471 IMMUNIZATION ADMIN: CPT | Performed by: INTERNAL MEDICINE

## 2018-09-28 PROCEDURE — 90686 IIV4 VACC NO PRSV 0.5 ML IM: CPT | Performed by: INTERNAL MEDICINE

## 2018-09-28 PROCEDURE — 99396 PREV VISIT EST AGE 40-64: CPT | Performed by: INTERNAL MEDICINE

## 2018-09-28 NOTE — PATIENT INSTRUCTIONS
Component      Latest Ref Rng & Units 8/23/2018 6/14/2018   Glucose      70 - 99 mg/dL  98   Sodium      136 - 144 mmol/L  137   Potassium      3.3 - 5.1 mmol/L  4.3   Chloride      95 - 110 mmol/L  103   Carbon Dioxide, Total      22 - 32 mmol/L  29   BUN 110 - 200 mg/dL  181   Triglycerides      1 - 149 mg/dL  54   NON HDL CHOL      <130 mg/dL  143 (H)   LDL Cholesterol Calc      0 - 99 mg/dL  132 (H)   TSH      0.45 - 5.33 uIU/mL  2.42   T4,Free (Direct)      0.58 - 1.64 ng/dL  0.82   HEMOGLOBIN A1c

## 2018-09-28 NOTE — PROGRESS NOTES
HPI:    Patient ID: Navdeep Garcia is a 37year old female.     HPI    Physical exam    /80 (BP Location: Right arm, Patient Position: Sitting, Cuff Size: large)   Pulse 80   Temp 98.3 °F (36.8 °C) (Oral)   Ht 4' 10.5\" (1.486 m)   Wt 282 lb (127.9 before breakfast. Disp: 30 tablet Rfl: 1   Sertraline HCl 100 MG Oral Tab Take 1 tablet (100 mg total) by mouth daily.  Disp: 90 tablet Rfl: 1   hydrochlorothiazide 12.5 MG Oral Cap TAKE 1 CAPSULE(12.5 MG) BY MOUTH DAILY Disp: 30 capsule Rfl: 2   Insulin Pe SURGERY      Comment:  R breast sebaceous cyst   Family History   Problem Relation Age of Onset   • Hypertension Father    • Lipids Father         HYPERLIPIDEMIA   • Diabetes Father    • Psychiatric Mother    • Cataracts Mother    • Macular degeneration Ne (obstructive sleep apnea)  Plan: improtance of treatment advsed    (E66.01) [facility]  Plan: Body mass index is 57.93 kg/m².   Wt loss advsed    (Z12.31) Visit for screening mammogram  Plan: [facility] JOHNNIE 2D+3D SCREENING BILAT         (CPT=77067/[id number]

## 2018-10-13 NOTE — TELEPHONE ENCOUNTER
Patient called asking for a refill , please call patient back       Current Outpatient Medications:   •  KETOCONAZOLE 2 % External Shampoo, APPLY EXTERNALLY 2 TO 3 TIMES WEEKLY AS DIRECTED, Disp: 120 mL, Rfl: 6

## 2018-10-16 RX ORDER — KETOCONAZOLE 20 MG/ML
SHAMPOO TOPICAL
Qty: 120 ML | Refills: 2 | Status: SHIPPED | OUTPATIENT
Start: 2018-10-16 | End: 2019-06-03

## 2018-11-10 ENCOUNTER — HOSPITAL ENCOUNTER (OUTPATIENT)
Dept: MAMMOGRAPHY | Facility: HOSPITAL | Age: 43
Discharge: HOME | End: 2018-11-10
Attending: INTERNAL MEDICINE
Payer: MEDICAID

## 2018-11-10 DIAGNOSIS — Z12.31 VISIT FOR SCREENING MAMMOGRAM: ICD-10-CM

## 2018-11-10 PROCEDURE — 77067 SCR MAMMO BI INCL CAD: CPT | Performed by: INTERNAL MEDICINE

## 2018-11-10 PROCEDURE — 77063 BREAST TOMOSYNTHESIS BI: CPT | Performed by: INTERNAL MEDICINE

## 2018-11-13 ENCOUNTER — TELEPHONE (OUTPATIENT)
Dept: INTERNAL MEDICINE CLINIC | Facility: CLINIC | Age: 43
End: 2018-11-13

## 2018-11-13 DIAGNOSIS — G43.019 INTRACTABLE MIGRAINE WITHOUT AURA AND WITHOUT STATUS MIGRAINOSUS: Primary | ICD-10-CM

## 2018-11-13 NOTE — TELEPHONE ENCOUNTER
Pt called in stating that she had an insurance change so she would need a new referral for Dr. Hamilton  for her headaches.     Please advise and call back with confirmation once authorized

## 2018-12-03 ENCOUNTER — APPOINTMENT (OUTPATIENT)
Dept: LAB | Facility: HOSPITAL | Age: 43
End: 2018-12-03
Attending: INTERNAL MEDICINE
Payer: MEDICAID

## 2018-12-03 DIAGNOSIS — E66.01 MORBID OBESITY WITH BMI OF 50.0-59.9, ADULT (HCC): ICD-10-CM

## 2018-12-03 DIAGNOSIS — G47.33 OSA ON CPAP: ICD-10-CM

## 2018-12-03 DIAGNOSIS — Z99.89 OSA ON CPAP: ICD-10-CM

## 2018-12-03 DIAGNOSIS — M17.0 PRIMARY OSTEOARTHRITIS OF BOTH KNEES: ICD-10-CM

## 2018-12-03 DIAGNOSIS — R63.2 BINGE EATING: ICD-10-CM

## 2018-12-03 DIAGNOSIS — R60.0 LOWER EXTREMITY EDEMA: ICD-10-CM

## 2018-12-03 DIAGNOSIS — Z51.81 ENCOUNTER FOR THERAPEUTIC DRUG MONITORING: ICD-10-CM

## 2018-12-03 PROCEDURE — 93005 ELECTROCARDIOGRAM TRACING: CPT

## 2018-12-03 PROCEDURE — 93010 ELECTROCARDIOGRAM REPORT: CPT | Performed by: INTERNAL MEDICINE

## 2018-12-17 ENCOUNTER — TELEPHONE (OUTPATIENT)
Dept: INTERNAL MEDICINE CLINIC | Facility: CLINIC | Age: 43
End: 2018-12-17

## 2018-12-17 DIAGNOSIS — M79.672 PAIN IN BOTH FEET: Primary | ICD-10-CM

## 2018-12-17 DIAGNOSIS — M79.671 PAIN IN BOTH FEET: Primary | ICD-10-CM

## 2018-12-17 NOTE — TELEPHONE ENCOUNTER
Pt called in requesting a new referral for Podiatrist. Pt saw Dr. Ag Mccarthy in 2016 and is asking for a new referral, due to an insurance change. Please advise, please call back with confirmation once added.

## 2019-01-02 ENCOUNTER — OFFICE VISIT (OUTPATIENT)
Dept: PODIATRY CLINIC | Facility: CLINIC | Age: 44
End: 2019-01-02
Payer: MEDICAID

## 2019-01-02 ENCOUNTER — HOSPITAL ENCOUNTER (OUTPATIENT)
Dept: GENERAL RADIOLOGY | Age: 44
Discharge: HOME | End: 2019-01-02
Attending: PODIATRIST
Payer: MEDICAID

## 2019-01-02 VITALS — SYSTOLIC BLOOD PRESSURE: 126 MMHG | DIASTOLIC BLOOD PRESSURE: 83 MMHG | HEART RATE: 70 BPM | RESPIRATION RATE: 16 BRPM

## 2019-01-02 DIAGNOSIS — M77.32 CALCANEAL SPUR OF LEFT FOOT: ICD-10-CM

## 2019-01-02 DIAGNOSIS — M72.2 PLANTAR FASCIITIS OF LEFT FOOT: Primary | ICD-10-CM

## 2019-01-02 DIAGNOSIS — M72.2 PLANTAR FASCIITIS OF LEFT FOOT: ICD-10-CM

## 2019-01-02 PROCEDURE — 20550 NJX 1 TENDON SHEATH/LIGAMENT: CPT | Performed by: PODIATRIST

## 2019-01-02 PROCEDURE — 99213 OFFICE O/P EST LOW 20 MIN: CPT | Performed by: PODIATRIST

## 2019-01-02 PROCEDURE — 73620 X-RAY EXAM OF FOOT: CPT | Performed by: PODIATRIST

## 2019-01-02 RX ORDER — TRIAMCINOLONE ACETONIDE 40 MG/ML
40 INJECTION, SUSPENSION INTRA-ARTICULAR; INTRAMUSCULAR ONCE
Status: DISCONTINUED | OUTPATIENT
Start: 2019-01-02 | End: 2021-02-05

## 2019-01-03 NOTE — PROGRESS NOTES
Preparred as ordered by Dr. Stef Menard. 1 cc of kenalog 40 and 1 cc of .5% marcaine. Pre injection vs stable. Pt given steroid information sheet. Consent signed by Bibi Sauer, myself and Dr Stef Menard for left foot steroid injection.  Medication administration per D

## 2019-01-06 NOTE — PROGRESS NOTES
Leeann Cleveland is a 37year old female. Patient presents with:  Consult: Pt is here for left heel pain. Pain for weeks. At times can not walk due to pain. Pain scale level  now 8. Heel is swollen.  has had pain in 2016  Foot Pain: Was told she had heel s (two) times daily. Disp:  Rfl:    Mometasone Furoate 0.1 % External Cream Apply 1 Application topically daily. Apply a thin film to the affected area(s). Disp: 45 g Rfl: 0   Rizatriptan Benzoate 10 MG Oral Tab as needed.    Disp:  Rfl: 5   Docosahexaenoic A Activity      Alcohol use:  Yes        Alcohol/week: 0.0 oz        Comment: RARELY      Drug use: No      Sexual activity: Not Currently        Partners: Male    Other Topics      Concerns:        Caffeine Concern: No          diet soda daily        Sleep C timeout was taken a cortisone injection consisting of 40 mg Kenalog and 1 cc of 0.5% Marcaine plain was injected into the symptomatic area of the left heel using aseptic technique and appropriate approach. A Band-Aid was applied to the injection site.   Sh

## 2019-01-08 ENCOUNTER — OFFICE VISIT (OUTPATIENT)
Dept: PHYSICAL THERAPY | Age: 44
End: 2019-01-08
Attending: INTERNAL MEDICINE
Payer: MEDICAID

## 2019-01-08 DIAGNOSIS — M77.32 CALCANEAL SPUR OF LEFT FOOT: ICD-10-CM

## 2019-01-08 DIAGNOSIS — M72.2 PLANTAR FASCIITIS OF LEFT FOOT: ICD-10-CM

## 2019-01-08 PROCEDURE — 97110 THERAPEUTIC EXERCISES: CPT | Performed by: PHYSICAL THERAPIST

## 2019-01-08 PROCEDURE — 97162 PT EVAL MOD COMPLEX 30 MIN: CPT | Performed by: PHYSICAL THERAPIST

## 2019-01-08 NOTE — PROGRESS NOTES
LOWER EXTREMITY EVALUATION:   Referring Physician: Dr. Esha Martins  Diagnosis: Plantar fasciitis of left foot (M72.2)  Calcaneal spur of left foot (M77.32)      Date of Onset: Dec 2018 Date of Evaluation: 1/8/2019  Visit # 1  Scheduled Visits 8  Insurance Aut Eversion 30 10*    EHL (L5)                            Strength    Right Left Comments   Ankle DF   5/5 4/5*    Ankle PF      Inversion 5/5 4-/5*    Eversion 5/5 4-/5*    EHL (L5)      Hip flexion      Hip Abduction      Hip Extension        Repeated Re-education;  Therapeutic Activity; Gait Training; Electrical Stim; Patient education; Home exercise program instruction; modalities prn      Education or treatment limitation: None  Rehab Potential:good    FOTO: 66% limited      Patient was advised of the

## 2019-01-10 ENCOUNTER — OFFICE VISIT (OUTPATIENT)
Dept: PHYSICAL THERAPY | Age: 44
End: 2019-01-10
Attending: INTERNAL MEDICINE
Payer: MEDICAID

## 2019-01-10 PROCEDURE — 97140 MANUAL THERAPY 1/> REGIONS: CPT | Performed by: PHYSICAL THERAPIST

## 2019-01-10 PROCEDURE — 97110 THERAPEUTIC EXERCISES: CPT | Performed by: PHYSICAL THERAPIST

## 2019-01-10 NOTE — PROGRESS NOTES
Dx: Plantar fasciitis of left foot (M72.2)  Calcaneal spur of left foot (M77.32)             Visit # 2  Fall Risk: standard     Scheduled Visits 8  Precautions: n/a   Insurance Authorized visits   6 [facility] MD visit: none scheduled  Evaluat

## 2019-01-14 ENCOUNTER — TELEPHONE (OUTPATIENT)
Dept: OBGYN CLINIC | Facility: CLINIC | Age: 44
End: 2019-01-14

## 2019-01-14 NOTE — TELEPHONE ENCOUNTER
C/O pain in left breast on bottom and top part of breast. States pain is mainly on bottom part. States had pain when had mammo done on 11/2018. States has \"very dense tissue\" and can't tell if has any lumps. Denies d/c, swollen, or warm to touch.  States

## 2019-01-14 NOTE — TELEPHONE ENCOUNTER
Call dropped twice. Called pt back to ask to rate pain. States dull achy pain 6-7/10 with minor relief from IBU. Pt advised if worsening of symptoms to call us back.  Pt also informed appt for February is for annual. States understanding and agrees with vasyl

## 2019-01-15 ENCOUNTER — OFFICE VISIT (OUTPATIENT)
Dept: PHYSICAL THERAPY | Age: 44
End: 2019-01-15
Attending: INTERNAL MEDICINE
Payer: MEDICAID

## 2019-01-15 PROCEDURE — 97110 THERAPEUTIC EXERCISES: CPT

## 2019-01-15 PROCEDURE — 97140 MANUAL THERAPY 1/> REGIONS: CPT

## 2019-01-15 NOTE — PROGRESS NOTES
Dx: Plantar fasciitis of left foot (M72.2)  Calcaneal spur of left foot (M77.32)             Visit # 3  Fall Risk: standard     Scheduled Visits 8  Precautions: n/a   Insurance Authorized visits   6 [facility] MD visit: none scheduled  Evaluat Plan: Cont PT per plan of care     Charges: 2 therex, 1 man       Total Timed Treatment: 38 min  Total Treatment Time: 38 min

## 2019-01-17 ENCOUNTER — OFFICE VISIT (OUTPATIENT)
Dept: OBGYN CLINIC | Facility: CLINIC | Age: 44
End: 2019-01-17
Payer: MEDICAID

## 2019-01-17 VITALS
SYSTOLIC BLOOD PRESSURE: 124 MMHG | HEART RATE: 73 BPM | WEIGHT: 286 LBS | DIASTOLIC BLOOD PRESSURE: 84 MMHG | BODY MASS INDEX: 59 KG/M2

## 2019-01-17 DIAGNOSIS — N64.4 MASTALGIA: Primary | ICD-10-CM

## 2019-01-17 PROCEDURE — 99213 OFFICE O/P EST LOW 20 MIN: CPT | Performed by: OBSTETRICS & GYNECOLOGY

## 2019-01-22 ENCOUNTER — OFFICE VISIT (OUTPATIENT)
Dept: PHYSICAL THERAPY | Age: 44
End: 2019-01-22
Attending: INTERNAL MEDICINE
Payer: MEDICAID

## 2019-01-22 PROCEDURE — 97140 MANUAL THERAPY 1/> REGIONS: CPT | Performed by: PHYSICAL THERAPIST

## 2019-01-22 PROCEDURE — 97110 THERAPEUTIC EXERCISES: CPT | Performed by: PHYSICAL THERAPIST

## 2019-01-22 NOTE — PROGRESS NOTES
Dx: Plantar fasciitis of left foot (M72.2)  Calcaneal spur of left foot (M77.32)             Visit # 4  Fall Risk: standard     Scheduled Visits 8  Precautions: n/a   Insurance Authorized visits   6 Count includes the [facility] MD visit: none scheduled  Evaluat ankle AROM to equal that of R ankle in order to be able to walk >10 minutes in a grocery store. 4.Pt will be able to manage stairs at standard height with reciprocal pattern without arm support so she can function within her house.    5.Pt will be able to

## 2019-01-24 ENCOUNTER — APPOINTMENT (OUTPATIENT)
Dept: PHYSICAL THERAPY | Age: 44
End: 2019-01-24
Attending: INTERNAL MEDICINE
Payer: MEDICAID

## 2019-01-29 ENCOUNTER — OFFICE VISIT (OUTPATIENT)
Dept: PHYSICAL THERAPY | Age: 44
End: 2019-01-29
Attending: INTERNAL MEDICINE
Payer: MEDICAID

## 2019-01-29 PROCEDURE — 97140 MANUAL THERAPY 1/> REGIONS: CPT | Performed by: PHYSICAL THERAPIST

## 2019-01-29 PROCEDURE — 97110 THERAPEUTIC EXERCISES: CPT | Performed by: PHYSICAL THERAPIST

## 2019-01-29 NOTE — PROGRESS NOTES
Dx: Plantar fasciitis of left foot (M72.2)  Calcaneal spur of left foot (M77.32)             Visit # 5  Fall Risk: standard     Scheduled Visits 8  Precautions: n/a   Insurance Authorized visits   6 [facility] MD visit: none scheduled  Evaluat

## 2019-01-31 ENCOUNTER — OFFICE VISIT (OUTPATIENT)
Dept: PHYSICAL THERAPY | Age: 44
End: 2019-01-31
Attending: INTERNAL MEDICINE
Payer: MEDICAID

## 2019-01-31 PROCEDURE — 97110 THERAPEUTIC EXERCISES: CPT | Performed by: PHYSICAL THERAPIST

## 2019-01-31 PROCEDURE — 97140 MANUAL THERAPY 1/> REGIONS: CPT | Performed by: PHYSICAL THERAPIST

## 2019-02-05 ENCOUNTER — OFFICE VISIT (OUTPATIENT)
Dept: PHYSICAL THERAPY | Age: 44
End: 2019-02-05
Attending: INTERNAL MEDICINE
Payer: MEDICAID

## 2019-02-05 PROCEDURE — 97110 THERAPEUTIC EXERCISES: CPT | Performed by: PHYSICAL THERAPIST

## 2019-02-05 PROCEDURE — 97140 MANUAL THERAPY 1/> REGIONS: CPT | Performed by: PHYSICAL THERAPIST

## 2019-02-05 NOTE — PROGRESS NOTES
Patient Name: Breezy Bustos, : 1975, MRN: [id number]   Date:  2019  Referring Physician:  Libia Milian    Diagnosis: Plantar fasciitis of left foot (M72.2)  Calcaneal spur of left foot (M77.32)         Discharge note    Pt has attend MDT      Electronically signed by therapist: Livier Smith PT        Dx: Plantar fasciitis of left foot (M72.2)  Calcaneal spur of left foot (M77.32)             Visit # 6  Fall Risk: standard     Scheduled Visits 7  Precautions: n/a   Insurance Aut

## 2019-02-06 ENCOUNTER — OFFICE VISIT (OUTPATIENT)
Dept: NEUROLOGY | Facility: CLINIC | Age: 44
End: 2019-02-06
Payer: MEDICAID

## 2019-02-06 VITALS
WEIGHT: 286 LBS | DIASTOLIC BLOOD PRESSURE: 90 MMHG | HEART RATE: 68 BPM | BODY MASS INDEX: 58.43 KG/M2 | SYSTOLIC BLOOD PRESSURE: 120 MMHG | HEIGHT: 58.5 IN | RESPIRATION RATE: 16 BRPM

## 2019-02-06 DIAGNOSIS — G43.009 MIGRAINE WITHOUT AURA AND WITHOUT STATUS MIGRAINOSUS, NOT INTRACTABLE: Primary | ICD-10-CM

## 2019-02-06 PROCEDURE — 99243 OFF/OP CNSLTJ NEW/EST LOW 30: CPT | Performed by: OTHER

## 2019-02-06 RX ORDER — RIZATRIPTAN BENZOATE 10 MG/1
TABLET ORAL
Qty: 27 TABLET | Refills: 3 | Status: SHIPPED | OUTPATIENT
Start: 2019-02-06 | End: 2020-06-11

## 2019-02-06 NOTE — PATIENT INSTRUCTIONS
Migraine headache  Introduction  Migraines are extremely painful, recurring headaches that are sometimes accompanied by other symptoms, such as visual disturbances, for example, seeing an aura or nausea.  There are 2 types of migraine:  Migraine with aura, vessels narrow or constrict, reducing blood flow and leading to visual disturbances, difficulty speaking, weakness, numbness, or tingling sensation in one area of the body, or other similar symptoms.  Later, the blood vessels dilate or enlarge, leading to i whether you have a migraine or another kind of headache, such as a tension or sinus headache.  Your doctor will ask questions about when your headaches occur, how long they last, how often they come on, the location of the pain, and any symptoms that accomp and time it started. Note what you ate for the preceding 24 hours, how long you slept the night before, what you were doing just before the headache, any unusual stress in your life, how long the headache lasted, and what you did to make it stop.   Other li drugs help prevent migraines, although researchers are not sure why:   Divalproex sodium (Depakote)   Gabapentin (Neurontin)   Topiramate (Topamax)   Botox.  Botox, a medication made from a purified form of botulinum toxin, has been approved to treat migrai Cheese   Monosodium glutamate (MSG), a flavor enhancer found often in food from [employer] containing the amino acid tyramine, found in red wine, aged cheese, smoked fish, chicken livers, figs, and some beans   Nuts   Peanut butter   Marek attacks in people with low levels of magnesium. In one study, people who took magnesium reduce the frequency of attacks by 41.6%, compared to 15.8% in those who took placebo.  Some studies also suggest that magnesium may help women whose migraines are melinda regular basis for up to 4 months. More research is needed to see whether butterbur is effective at preventing migraines. The studies used a standardized extract that lowered the amount of substances in the herb that might potentially harm the liver.  If you taking:  Ignacia Alley (Ursula sinensis). Ask your doctor before taking Joanna Bee, as it may interact with some medications or cause problems for people with some cancers. Shira (Zingiber officinale)   Ginkgo biloba   Lebeau bark(Salix spp.).  People who a on the hands and feet that are believed to correspond to areas throughout the body. Preliminary studies suggest it may relieve pain and allow people with migraines to take less pain medication. More research is needed.  Practitioners believe reflexology hel originates in the back of the head and may be relieved following urination; this remedy is most appropriate for individuals who feel extremely weak and have difficulty keeping their eyes open. Ignatia.  For pain that may be described as a feeling of somet recur in a predictable pattern (such as every seven days), and are accompanied by nausea and vomiting; pain is aggravated by motion, light or sun exposure, odors, and noise; this remedy is appropriate for children who may have a craving for spicy or acidic using any medication, over-the-counter or prescription, or any complementary therapy before or during your pregnancy. Some doctors may recommend treating mild-to-moderate attacks during pregnancy with acetaminophen (Tylenol).   Warnings and Precautions  Use

## 2019-02-06 NOTE — PROGRESS NOTES
Neurology Outpatient Consult Note    Alfredo Alcaraz : 1975   Referring Physician: Dr. Arnold Mendoza  HPI:     Alfredo Alcaraz is a 37year old female who is being seen in neurologic evaluation.     Patient being seen in evaluation for migraine h mouth. Disp:  Rfl:    Calcium Carbonate-Vit D-Min (CALCIUM 1200 OR) Take by mouth daily. Disp:  Rfl:    Multiple Vitamins-Minerals (MULTI-VITAMIN/MINERALS) Oral Tab Take 1 tablet by mouth daily.  Disp:  Rfl:       Past Medical History:   Diagnosis Date   • Pt has a pacemaker: No        Pt has a defibrillator: No        Reaction to local anesthetic: No    Social History Narrative      The patient does not use an assistive device. .        The patient does live in a home with stairs.         ROS:   GENERAL: no f (see patient instructions)    –We did discuss vitamins that may be helpful, specifically magnesium and riboflavin, 400 mg/day    –Continue Maxalt as needed; advised she can try taking this along with an over-the-counter medication such as ibuprofen for add

## 2019-04-04 ENCOUNTER — OFFICE VISIT (OUTPATIENT)
Dept: OBGYN CLINIC | Facility: CLINIC | Age: 44
End: 2019-04-04
Payer: MEDICAID

## 2019-04-04 VITALS
WEIGHT: 293 LBS | HEIGHT: 58.5 IN | DIASTOLIC BLOOD PRESSURE: 84 MMHG | SYSTOLIC BLOOD PRESSURE: 127 MMHG | HEART RATE: 81 BPM | BODY MASS INDEX: 59.86 KG/M2

## 2019-04-04 DIAGNOSIS — Z01.419 ENCOUNTER FOR GYNECOLOGICAL EXAMINATION: Primary | ICD-10-CM

## 2019-04-04 DIAGNOSIS — N64.4 MASTALGIA: ICD-10-CM

## 2019-04-04 PROCEDURE — 99396 PREV VISIT EST AGE 40-64: CPT | Performed by: OBSTETRICS & GYNECOLOGY

## 2019-04-05 NOTE — PROGRESS NOTES
Nicanor Chatman is a 37year old female [id number] Patient's last menstrual period was 03/10/2019. here for annual exam.       Last seen 1/17/19 for mastalgia. Had colpo in 9/2017-- EUN 1. Still with left mastalgia.   Having sharp pain in lower middle br MEDICATIONS:    Current Outpatient Medications:  Sertraline HCl 100 MG Oral Tab Take 1 tablet (100 mg total) by mouth daily.  Disp: 90 tablet Rfl: 1   Rizatriptan Benzoate 10 MG Oral Tab 1-2 tablets at onset of migraine, may repeat in 1 hour; no more bleeding.     PHYSICAL EXAM:   /84   Pulse 81   Ht 4' 10.5\" (1.486 m)   Wt 294 lb (133.4 kg)   LMP 03/10/2019   BMI 60.40 kg/m²   Wt Readings from Last 2 Encounters:  04/04/19 : 294 lb (133.4 kg)  02/06/19 : 286 lb (129.7 kg)    Body mass index is 60

## 2019-04-08 NOTE — PROGRESS NOTES
Pap-- ASCUS with negative HPV. Needs repeat pap/HPV in 1 year.     Return to clinic 1 year for annual.

## 2019-06-04 ENCOUNTER — TELEPHONE (OUTPATIENT)
Dept: OBGYN CLINIC | Facility: CLINIC | Age: 44
End: 2019-06-04

## 2019-06-04 DIAGNOSIS — Z01.419 ROUTINE GYNECOLOGICAL EXAMINATION: ICD-10-CM

## 2019-06-04 DIAGNOSIS — N64.4 MASTALGIA: Primary | ICD-10-CM

## 2019-06-04 RX ORDER — KETOCONAZOLE 20 MG/ML
SHAMPOO TOPICAL
Qty: 120 ML | Refills: 1 | Status: SHIPPED | OUTPATIENT
Start: 2019-06-04 | End: 2020-06-02

## 2019-06-04 NOTE — TELEPHONE ENCOUNTER
Bret Carvalho from scheduling states pt needs bilateral diag mammo because it has been more than 6 months since last mammo. mammo ordered.

## 2019-06-20 ENCOUNTER — HOSPITAL ENCOUNTER (OUTPATIENT)
Dept: MAMMOGRAPHY | Facility: HOSPITAL | Age: 44
Discharge: HOME | End: 2019-06-20
Attending: OBSTETRICS & GYNECOLOGY
Payer: MEDICAID

## 2019-06-20 ENCOUNTER — HOSPITAL ENCOUNTER (OUTPATIENT)
Dept: ULTRASOUND IMAGING | Facility: HOSPITAL | Age: 44
Discharge: HOME | End: 2019-06-20
Attending: OBSTETRICS & GYNECOLOGY
Payer: MEDICAID

## 2019-06-20 DIAGNOSIS — N64.4 MASTALGIA: ICD-10-CM

## 2019-06-20 DIAGNOSIS — Z01.419 ROUTINE GYNECOLOGICAL EXAMINATION: ICD-10-CM

## 2019-06-20 PROCEDURE — 76642 ULTRASOUND BREAST LIMITED: CPT | Performed by: OBSTETRICS & GYNECOLOGY

## 2019-06-20 PROCEDURE — 77065 DX MAMMO INCL CAD UNI: CPT | Performed by: OBSTETRICS & GYNECOLOGY

## 2019-06-20 PROCEDURE — 77061 BREAST TOMOSYNTHESIS UNI: CPT | Performed by: OBSTETRICS & GYNECOLOGY

## 2019-06-21 ENCOUNTER — APPOINTMENT (OUTPATIENT)
Dept: LAB | Age: 44
End: 2019-06-21
Attending: INTERNAL MEDICINE
Payer: MEDICAID

## 2019-06-21 ENCOUNTER — TELEPHONE (OUTPATIENT)
Dept: OBGYN CLINIC | Facility: CLINIC | Age: 44
End: 2019-06-21

## 2019-06-21 ENCOUNTER — OFFICE VISIT (OUTPATIENT)
Dept: INTERNAL MEDICINE CLINIC | Facility: CLINIC | Age: 44
End: 2019-06-21
Payer: MEDICAID

## 2019-06-21 VITALS
HEIGHT: 59.5 IN | BODY MASS INDEX: 58.29 KG/M2 | WEIGHT: 293 LBS | SYSTOLIC BLOOD PRESSURE: 127 MMHG | DIASTOLIC BLOOD PRESSURE: 86 MMHG | TEMPERATURE: 99 F | HEART RATE: 82 BPM

## 2019-06-21 DIAGNOSIS — R10.13 EPIGASTRIC PAIN: ICD-10-CM

## 2019-06-21 DIAGNOSIS — K21.00 GASTROESOPHAGEAL REFLUX DISEASE WITH ESOPHAGITIS: Primary | ICD-10-CM

## 2019-06-21 DIAGNOSIS — E66.01 MORBID OBESITY (HCC): ICD-10-CM

## 2019-06-21 DIAGNOSIS — E78.5 HYPERLIPIDEMIA, UNSPECIFIED HYPERLIPIDEMIA TYPE: ICD-10-CM

## 2019-06-21 DIAGNOSIS — R53.83 FATIGUE, UNSPECIFIED TYPE: ICD-10-CM

## 2019-06-21 PROCEDURE — 36415 COLL VENOUS BLD VENIPUNCTURE: CPT

## 2019-06-21 PROCEDURE — 80061 LIPID PANEL: CPT

## 2019-06-21 PROCEDURE — 93005 ELECTROCARDIOGRAM TRACING: CPT

## 2019-06-21 PROCEDURE — 83036 HEMOGLOBIN GLYCOSYLATED A1C: CPT

## 2019-06-21 PROCEDURE — 84439 ASSAY OF FREE THYROXINE: CPT

## 2019-06-21 PROCEDURE — 82150 ASSAY OF AMYLASE: CPT

## 2019-06-21 PROCEDURE — 99214 OFFICE O/P EST MOD 30 MIN: CPT | Performed by: INTERNAL MEDICINE

## 2019-06-21 PROCEDURE — 81015 MICROSCOPIC EXAM OF URINE: CPT

## 2019-06-21 PROCEDURE — 80053 COMPREHEN METABOLIC PANEL: CPT

## 2019-06-21 PROCEDURE — 84443 ASSAY THYROID STIM HORMONE: CPT

## 2019-06-21 PROCEDURE — 83690 ASSAY OF LIPASE: CPT

## 2019-06-21 PROCEDURE — 99212 OFFICE O/P EST SF 10 MIN: CPT | Performed by: INTERNAL MEDICINE

## 2019-06-21 PROCEDURE — 93010 ELECTROCARDIOGRAM REPORT: CPT | Performed by: INTERNAL MEDICINE

## 2019-06-21 PROCEDURE — 85027 COMPLETE CBC AUTOMATED: CPT

## 2019-06-21 RX ORDER — OMEPRAZOLE 40 MG/1
40 CAPSULE, DELAYED RELEASE ORAL DAILY
Qty: 90 CAPSULE | Refills: 3 | Status: SHIPPED | OUTPATIENT
Start: 2019-06-21 | End: 2020-07-07

## 2019-06-21 NOTE — PROGRESS NOTES
HPI:    Patient ID: Katt Batista is a 37year old female.     HPI     Epigastric pain off and on mostly at night  Reflux  Nausea  Gaining weight instead of loosing  Stress  Dealing with ex   Denies chest pain    Activity limited by wieght issues Rfl: 3   Ketoconazole 2 % External Shampoo APPLY  TO AFFECTED AREA 2 TO 3 TIMES WEEKLY AS DIRECTED Disp: 120 mL Rfl: 1   Rizatriptan Benzoate 10 MG Oral Tab 1-2 tablets at onset of migraine, may repeat in 1 hour; no more than 4 tablets in 24 hours Disp: 27 HYPERLIPIDEMIA   • Diabetes Father    • Psychiatric Mother    • Cataracts Mother    • Macular degeneration Neg    • Glaucoma Neg    • Retinal detachment Neg       Social History: Social History    Tobacco Use      Smoking status: Former Smoker        Years Placed This Encounter      Assay, Thyroid Stim Hormone      Free T4, (Free Thyroxine)      Lipid Panel      CBC, Platelet;  No Differential      Comp Metabolic Panel (14)      Hemoglobin A1C      Urine Microscopic w Reflex CULTURE      Amylase [E]      Lipa

## 2019-06-21 NOTE — TELEPHONE ENCOUNTER
Scarlett Hernández requesting authorization for pt u/s be faxed to [facility] at [phone]. CPT code 16994. Please advise.

## 2019-06-21 NOTE — TELEPHONE ENCOUNTER
Obtained authorization. Auth # [id number] is valid from 6/20/2019 to 8/4/2019. [facility] and [facility] in Insurance Verification.

## 2019-06-21 NOTE — PATIENT INSTRUCTIONS
Tips to Control Acid Reflux    To control acid reflux, you’ll need to make some basic diet and lifestyle changes. The simple steps outlined below may be all you’ll need to ease discomfort. Watch what you eat  · Avoid fatty foods and spicy foods.   · Eat · Talk to your healthcare provider if you are taking any of the following medicines. These medicines can make GERD symptoms worse:  ? Calcium channel blockers  ? Theophylline  ?  Anticholinergic medicines, such as oxybutynin and benzatropine  · Begin an exe The esophagus is a tube that carries food from the mouth to the stomach. A valve (the LES, lower esophageal sphincter) at the lower end of the esophagus prevents stomach acid from flowing upward.  When this valve doesn't work properly, stomach contents may · If your symptoms occur during sleep, use a foam wedge to elevate your upper body (not just your head.) Or, place 4\" blocks under the head of your bed. Or use 2 bed risers under your bedframe.   Medicines  If needed, medicines can help relieve the symptom © 8254-0295 [employer] [address]. [address] [facility], [facility] [address] [place]. All rights reserved. This information is not intended as a substitute for professional medical care. Always follow your healthcare professional's instructions.         Tiara Coleman Date Last Reviewed: 7/1/2016  © 3008-5663 [employer] [address]. [address] [facility], [address] [place]. All rights reserved. This information is not intended as a substitute for professional medical care.  Always follow your healthcare professional'

## 2019-07-03 ENCOUNTER — HOSPITAL ENCOUNTER (OUTPATIENT)
Dept: ULTRASOUND IMAGING | Age: 44
Discharge: HOME | End: 2019-07-03
Attending: INTERNAL MEDICINE
Payer: MEDICAID

## 2019-07-03 DIAGNOSIS — R10.13 EPIGASTRIC PAIN: ICD-10-CM

## 2019-07-03 PROCEDURE — 76705 ECHO EXAM OF ABDOMEN: CPT | Performed by: INTERNAL MEDICINE

## 2019-07-23 NOTE — H&P
[address] [facility] [address] Gastroenterology                                                                                                  Clinic History and Physical     Pa Migraines    • Morbid obesity with BMI of 50.0-59.9, adult ([facility] [address]    • ANJELICA on CPAP    • Ovarian cyst 2012   • Sleep apnea    • Varicose vein       Past Surgical History:   Procedure Laterality Date   • BREAST BIOPSY Right 12/6/2017    Performed by Cole Patel Apply 1 Application topically daily. Apply a thin film to the affected area(s). Disp: 45 g Rfl: 0   Multiple Vitamins-Minerals (MULTI-VITAMIN/MINERALS) Oral Tab Take 1 tablet by mouth daily. Disp:  Rfl:    TURMERIC OR Take by mouth.  Disp:  Rfl:    Garlic 1 Neuro: Alert and oriented x4, and patient is having movements of all 4 extremities   Psych: Pt has a normal mood and affect, behavior is normal    Nursing note and vitals reviewed    Labs/Imaging:     Patient's labs and imaging were reviewed and discusse Transglutaminase Ab, IgA      Sed Rate, Westergren (Automated)      C-Reactive Protein      Clostridium difficile(toxigenic)PCR      Ova and Parasites Non-traveler Giardia + Crypto Antigen, Stool      Stool Culture W/ Shigatoxin      Meds This Visit:  Requ

## 2019-07-30 ENCOUNTER — OFFICE VISIT (OUTPATIENT)
Dept: GASTROENTEROLOGY | Facility: CLINIC | Age: 44
End: 2019-07-30
Payer: MEDICAID

## 2019-07-30 ENCOUNTER — APPOINTMENT (OUTPATIENT)
Dept: LAB | Facility: HOSPITAL | Age: 44
End: 2019-07-30
Attending: NURSE PRACTITIONER
Payer: MEDICAID

## 2019-07-30 VITALS
WEIGHT: 293 LBS | SYSTOLIC BLOOD PRESSURE: 137 MMHG | DIASTOLIC BLOOD PRESSURE: 85 MMHG | HEIGHT: 59 IN | BODY MASS INDEX: 59.07 KG/M2 | HEART RATE: 75 BPM

## 2019-07-30 DIAGNOSIS — K21.9 GASTROESOPHAGEAL REFLUX DISEASE, ESOPHAGITIS PRESENCE NOT SPECIFIED: Primary | ICD-10-CM

## 2019-07-30 DIAGNOSIS — R19.8 IRREGULAR BOWEL HABITS: ICD-10-CM

## 2019-07-30 LAB
CRP SERPL-MCNC: 1.03 MG/DL (ref ?–0.3)
ERYTHROCYTE [SEDIMENTATION RATE] IN BLOOD: 32 MM/HR (ref 0–20)
IGA SERPL-MCNC: 379 MG/DL (ref 70–312)

## 2019-07-30 PROCEDURE — 82784 ASSAY IGA/IGD/IGG/IGM EACH: CPT | Performed by: NURSE PRACTITIONER

## 2019-07-30 PROCEDURE — 83516 IMMUNOASSAY NONANTIBODY: CPT

## 2019-07-30 PROCEDURE — 85652 RBC SED RATE AUTOMATED: CPT

## 2019-07-30 PROCEDURE — 86140 C-REACTIVE PROTEIN: CPT

## 2019-07-30 PROCEDURE — 36415 COLL VENOUS BLD VENIPUNCTURE: CPT

## 2019-07-30 PROCEDURE — 99243 OFF/OP CNSLTJ NEW/EST LOW 30: CPT | Performed by: NURSE PRACTITIONER

## 2019-07-30 RX ORDER — HYDROCHLOROTHIAZIDE 12.5 MG/1
12.5 CAPSULE, GELATIN COATED ORAL EVERY MORNING
Refills: 2 | COMMUNITY
Start: 2019-07-24 | End: 2019-12-10

## 2019-07-30 NOTE — PATIENT INSTRUCTIONS
1.  Complete lab work  2. See additional reflux suggestions below  3. If stool tests are negative, consider probiotics, fiber supplements, FODMAP diet (http://limon.com/), free diet  4.   Follow-up in 4-6 weeks      Av

## 2019-07-31 LAB — TTG IGA SER-ACNC: 0.7 U/ML (ref ?–7)

## 2019-08-04 ENCOUNTER — LAB ENCOUNTER (OUTPATIENT)
Dept: LAB | Facility: HOSPITAL | Age: 44
End: 2019-08-04
Attending: NURSE PRACTITIONER
Payer: MEDICAID

## 2019-08-04 DIAGNOSIS — R19.8 IRREGULAR BOWEL HABITS: ICD-10-CM

## 2019-08-04 PROCEDURE — 87272 CRYPTOSPORIDIUM AG IF: CPT

## 2019-08-04 PROCEDURE — 87045 FECES CULTURE AEROBIC BACT: CPT

## 2019-08-04 PROCEDURE — 87046 STOOL CULTR AEROBIC BACT EA: CPT

## 2019-08-04 PROCEDURE — 87329 GIARDIA AG IA: CPT

## 2019-08-04 PROCEDURE — 87493 C DIFF AMPLIFIED PROBE: CPT

## 2019-08-04 PROCEDURE — 87427 SHIGA-LIKE TOXIN AG IA: CPT

## 2019-08-05 ENCOUNTER — TELEPHONE (OUTPATIENT)
Dept: GASTROENTEROLOGY | Facility: CLINIC | Age: 44
End: 2019-08-05

## 2019-08-05 LAB
C DIFF TOX B STL QL: POSITIVE
CRYPTOSP AG STL QL IA: NEGATIVE
G LAMBLIA AG STL QL IA: NEGATIVE

## 2019-08-05 RX ORDER — VANCOMYCIN HYDROCHLORIDE 125 MG/1
125 CAPSULE ORAL 4 TIMES DAILY
Qty: 40 CAPSULE | Refills: 0 | Status: SHIPPED | OUTPATIENT
Start: 2019-08-05 | End: 2019-08-15

## 2019-08-05 NOTE — TELEPHONE ENCOUNTER
I spoke to the patient regarding + symptoms results. She will start vancomycin 125 mg 4 times daily x10 days and probiotics. Will await the rest of the lab results.     We will plan for an office follow-up in the next few weeks for a symptom update or s

## 2019-08-05 NOTE — TELEPHONE ENCOUNTER
Received call from Yanna Morton from Lab. Informing Janay Barksdale pt tested positive for C. Diff. No further information provided.

## 2019-08-27 DIAGNOSIS — R60.0 LOWER EXTREMITY EDEMA: ICD-10-CM

## 2019-08-28 RX ORDER — HYDROCHLOROTHIAZIDE 12.5 MG/1
CAPSULE, GELATIN COATED ORAL
Qty: 30 CAPSULE | Refills: 0 | OUTPATIENT
Start: 2019-08-28

## 2019-10-04 ENCOUNTER — HOSPITAL ENCOUNTER (OUTPATIENT)
Age: 44
Discharge: HOME | End: 2019-10-04
Attending: EMERGENCY MEDICINE
Payer: MEDICAID

## 2019-10-04 VITALS
HEART RATE: 88 BPM | TEMPERATURE: 99 F | RESPIRATION RATE: 20 BRPM | DIASTOLIC BLOOD PRESSURE: 80 MMHG | OXYGEN SATURATION: 97 % | SYSTOLIC BLOOD PRESSURE: 130 MMHG

## 2019-10-04 DIAGNOSIS — J01.40 ACUTE NON-RECURRENT PANSINUSITIS: Primary | ICD-10-CM

## 2019-10-04 DIAGNOSIS — H65.91 RIGHT OTITIS MEDIA WITH EFFUSION: ICD-10-CM

## 2019-10-04 DIAGNOSIS — R05.8 PRODUCTIVE COUGH: ICD-10-CM

## 2019-10-04 PROCEDURE — 87430 STREP A AG IA: CPT

## 2019-10-04 PROCEDURE — 99214 OFFICE O/P EST MOD 30 MIN: CPT

## 2019-10-04 PROCEDURE — 99213 OFFICE O/P EST LOW 20 MIN: CPT

## 2019-10-04 RX ORDER — FLUTICASONE PROPIONATE 50 MCG
2 SPRAY, SUSPENSION (ML) NASAL DAILY
Qty: 16 G | Refills: 0 | Status: SHIPPED | OUTPATIENT
Start: 2019-10-04 | End: 2019-11-03

## 2019-10-04 RX ORDER — AZITHROMYCIN 250 MG/1
TABLET, FILM COATED ORAL
Qty: 1 PACKAGE | Refills: 0 | Status: SHIPPED | OUTPATIENT
Start: 2019-10-04 | End: 2019-10-09 | Stop reason: ALTCHOICE

## 2019-10-04 NOTE — ED INITIAL ASSESSMENT (HPI)
Pt c/o productive cough, nasal congestion, sore throat x3 days, fever since yesterday, R ear pain since this am. Tmax 99. States mom is hospitalized with pneumonia.

## 2019-10-04 NOTE — ED PROVIDER NOTES
Patient Seen in: 5 [facility]      History   Patient presents with:  Cough/URI    Stated Complaint: SORE THROAT    HPI    The patient is a 42-year-old female with past history of asthma who presents now with productive coug above.    Physical Exam     ED Triage Vitals [10/04/19 1132]   /80   Pulse 88   Resp 20   Temp 99.2 °F (37.3 °C)   Temp src Oral   SpO2 97 %   O2 Device None (Room air)       Current:/80   Pulse 88   Temp 99.2 °F (37.3 °C) (Oral)   Resp 20   LM (ZITHROMAX Z-JANET) 250 MG Oral Tab  500 mg once followed by 250 mg daily x 4 days  Qty: 1 Package Refills: 0    Fluticasone Propionate 50 MCG/ACT Nasal Suspension  2 sprays by Nasal route daily.   Qty: 16 g Refills: 0

## 2019-10-09 ENCOUNTER — OFFICE VISIT (OUTPATIENT)
Dept: INTERNAL MEDICINE CLINIC | Facility: CLINIC | Age: 44
End: 2019-10-09
Payer: MEDICAID

## 2019-10-09 VITALS
SYSTOLIC BLOOD PRESSURE: 135 MMHG | DIASTOLIC BLOOD PRESSURE: 87 MMHG | TEMPERATURE: 99 F | OXYGEN SATURATION: 97 % | BODY MASS INDEX: 59.07 KG/M2 | WEIGHT: 293 LBS | HEIGHT: 59 IN | HEART RATE: 77 BPM

## 2019-10-09 DIAGNOSIS — J01.00 ACUTE NON-RECURRENT MAXILLARY SINUSITIS: ICD-10-CM

## 2019-10-09 DIAGNOSIS — H92.02 LEFT EAR PAIN: Primary | ICD-10-CM

## 2019-10-09 PROCEDURE — 99213 OFFICE O/P EST LOW 20 MIN: CPT | Performed by: INTERNAL MEDICINE

## 2019-10-09 RX ORDER — AMOXICILLIN AND CLAVULANATE POTASSIUM 875; 125 MG/1; MG/1
1 TABLET, FILM COATED ORAL 2 TIMES DAILY
Qty: 20 TABLET | Refills: 0 | Status: SHIPPED | OUTPATIENT
Start: 2019-10-09 | End: 2020-05-22

## 2019-10-09 NOTE — PATIENT INSTRUCTIONS
1.  Take antibiotics as prescribed. Make sure to take with food twice a day. 2.  Symptomatic treatment with hot showers, steam inhalation, Renita Dodson, decongestants, cough suppressants as needed.   3.  Begin over-the-counter Flonase for your postnasal d

## 2019-10-09 NOTE — PROGRESS NOTES
Patient ID: Eric Meyers is a 40year old female. Patient presents with:  Urgent Care F/u: 10/4/19 [facility]- seen for cough, sinus, and ear infection. finished abx and is still having symptoms.    Ear Pain: Bilateral ear pain 5/10       HISTORY OF PRESE Disp: 16 g, Rfl: 0  •  Minoxidil 2 % External Solution, Apply topically 2 (two) times daily. , Disp: , Rfl:   •  Omeprazole 40 MG Oral Capsule Delayed Release, Take 1 capsule (40 mg total) by mouth daily. , Disp: 90 capsule, Rfl: 3  •  Ketoconazole 2 % Exter Activity      Alcohol use:  Yes        Alcohol/week: 0.0 standard drinks        Comment: RARELY      Drug use: No      Sexual activity: Not Currently        Partners: Male    Lifestyle      Physical activity:        Days per week: Not on file        Minutes index is 61 kg/m². Physical Exam   Constitutional: She appears well-developed. HENT:   Head: Normocephalic and atraumatic. Right Ear: There is swelling. Left Ear: There is swelling. Nose: Right sinus exhibits maxillary sinus tenderness.  Right si

## 2019-10-11 DIAGNOSIS — F43.9 STRESS: ICD-10-CM

## 2019-10-11 RX ORDER — SERTRALINE HYDROCHLORIDE 100 MG/1
TABLET, FILM COATED ORAL
Qty: 30 TABLET | Refills: 0 | OUTPATIENT
Start: 2019-10-11

## 2019-10-28 ENCOUNTER — OFFICE VISIT (OUTPATIENT)
Dept: INTERNAL MEDICINE CLINIC | Facility: CLINIC | Age: 44
End: 2019-10-28
Payer: MEDICAID

## 2019-10-28 ENCOUNTER — TELEPHONE (OUTPATIENT)
Dept: INTERNAL MEDICINE CLINIC | Facility: CLINIC | Age: 44
End: 2019-10-28

## 2019-10-28 ENCOUNTER — TELEPHONE (OUTPATIENT)
Dept: OTHER | Age: 44
End: 2019-10-28

## 2019-10-28 VITALS
BODY MASS INDEX: 59.07 KG/M2 | HEIGHT: 59 IN | TEMPERATURE: 98 F | WEIGHT: 293 LBS | DIASTOLIC BLOOD PRESSURE: 87 MMHG | SYSTOLIC BLOOD PRESSURE: 134 MMHG | HEART RATE: 80 BPM

## 2019-10-28 DIAGNOSIS — J06.9 URI WITH COUGH AND CONGESTION: Primary | ICD-10-CM

## 2019-10-28 PROCEDURE — 99213 OFFICE O/P EST LOW 20 MIN: CPT | Performed by: INTERNAL MEDICINE

## 2019-10-28 RX ORDER — CODEINE PHOSPHATE AND GUAIFENESIN 10; 100 MG/5ML; MG/5ML
5 SOLUTION ORAL EVERY 6 HOURS PRN
Qty: 180 ML | Refills: 0 | Status: SHIPPED | OUTPATIENT
Start: 2019-10-28 | End: 2019-12-10

## 2019-10-28 RX ORDER — AZITHROMYCIN 500 MG/1
500 TABLET, FILM COATED ORAL DAILY
Qty: 5 TABLET | Refills: 1 | Status: SHIPPED | OUTPATIENT
Start: 2019-10-28 | End: 2019-11-02

## 2019-10-28 NOTE — TELEPHONE ENCOUNTER
Pt. States that she continues to get symptoms of cold, cough, sinus press, after finishing her antibiotic. Pt. Was seen on 10/9/19. I transferred the call to RN Triage.

## 2019-11-07 NOTE — PROGRESS NOTES
HPI:    Patient ID: Isidra Pool is a 40year old female.     HPI    Cough congestion sore throat  Ear itching bilateral  No fever   Feels tired      /87 (BP Location: Right arm, Patient Position: Sitting, Cuff Size: large)   Pulse 80   Temp 98.4 Nystatin [id number] UNIT/GM External Powder Apply 1 Application topically 4 (four) times daily. Apply to affected areas 30 g 1   • hydrocortisone 1 % External Cream Apply topically 2 (two) times daily.      • Mometasone Furoate 0.1 % External Cream Apply 1 Appl atraumatic. Right Ear: External ear normal.   Left Ear: External ear normal.   Nose: Nose normal.   Mouth/Throat: Oropharynx is clear and moist. No oropharyngeal exudate. congested   Eyes: Conjunctivae are normal. Right eye exhibits no discharge.  No sc

## 2019-12-10 ENCOUNTER — OFFICE VISIT (OUTPATIENT)
Dept: NEUROLOGY | Facility: CLINIC | Age: 44
End: 2019-12-10

## 2019-12-10 VITALS
HEIGHT: 60 IN | SYSTOLIC BLOOD PRESSURE: 114 MMHG | BODY MASS INDEX: 57.52 KG/M2 | HEART RATE: 84 BPM | DIASTOLIC BLOOD PRESSURE: 84 MMHG | WEIGHT: 293 LBS

## 2019-12-10 DIAGNOSIS — G43.019 INTRACTABLE MIGRAINE WITHOUT AURA AND WITHOUT STATUS MIGRAINOSUS: Primary | ICD-10-CM

## 2019-12-10 PROCEDURE — 99213 OFFICE O/P EST LOW 20 MIN: CPT | Performed by: OTHER

## 2019-12-10 RX ORDER — IBUPROFEN 600 MG/1
TABLET ORAL
Qty: 30 TABLET | Refills: 3 | Status: SHIPPED | OUTPATIENT
Start: 2019-12-10 | End: 2020-05-22

## 2019-12-10 RX ORDER — METOCLOPRAMIDE 10 MG/1
TABLET ORAL
Qty: 30 TABLET | Refills: 3 | Status: SHIPPED | OUTPATIENT
Start: 2019-12-10 | End: 2021-07-07

## 2019-12-10 RX ORDER — TOPIRAMATE 50 MG/1
TABLET, FILM COATED ORAL
Qty: 60 TABLET | Refills: 3 | Status: SHIPPED | OUTPATIENT
Start: 2019-12-10 | End: 2021-01-14

## 2019-12-10 RX ORDER — SUMATRIPTAN 100 MG/1
TABLET, FILM COATED ORAL
Qty: 9 TABLET | Refills: 3 | Status: SHIPPED | OUTPATIENT
Start: 2019-12-10 | End: 2021-01-21

## 2019-12-11 ENCOUNTER — TELEPHONE (OUTPATIENT)
Dept: CARDIOLOGY CLINIC | Facility: CLINIC | Age: 44
End: 2019-12-11

## 2019-12-11 NOTE — TELEPHONE ENCOUNTER
PA for Omeprazole 40 mg tab completed with [employer] via CMM response time 3-5 business days KEY [id number].

## 2019-12-11 NOTE — TELEPHONE ENCOUNTER
Current Outpatient Medications:     •  Omeprazole 40 MG Oral Capsule Delayed Release, Take 1 capsule (40 mg total) by mouth daily. , Disp: 90 capsule, Rfl: 3

## 2019-12-12 NOTE — TELEPHONE ENCOUNTER
"Social Work: Assessment with Discharge Plan    Patient Name:  Gabi Manley  :  1941  Age:  77 year old  MRN:  [id number]  Risk/Complexity Score:     Completed assessment with:  Chart review, pt    Presenting Information   Reason for Referral:  Discharge plan, TCU placement  Date of Intake:  October 3, 2018  Referral Source:  PT staff  Decision Maker:  Pt  Alternate Decision Maker:  Pt reported she has completed a health care directive naming her son Arvind as her alternate decision maker (she does not have his phone number). This is not on file and SW explained that [facility] policy would identify pt's children collectively.   Health Care Directive:  Will bring in copy  Living Situation:  [facility]dependent [facility] Living facility- [facility].   Previous Functional Status:  Assistance with Other:  Per RNCC pt has all meals provided at facility, gets assistance with laundry and cleaning. Pt can receive increased services including assist of 1 with transfers, and incontinence cares if needed.   Patient and family understanding of hospitalization:  Pt reported she was admitted due to a kidney/bladder infection. Pt also discussed having had a deep brain stimulator placed two weeks ago to hopefully help with her parkinsons and give her more ability to do things.   Cultural/Language/Spiritual Considerations:  Pt is an english speaking female, whom identifies as Restorationism and relies heavily on her hernando and discussed how her hernando got her through her recent surgery and discussed how \"God is always with me\".   Adjustment to Illness:  Pt is adjusting appropriately.     Physical Health  Reason for Admission:    1. Pyelonephritis      Services Needed/Recommended:  TCU    Mental Health/Chemical Dependency  Diagnosis:  Per H&P pt has a diagnosis of depression. Pt reported this will be a life long thing for her and she reported \"I handle it\" and she denied having any concerns. Pt described herself as \"usually upbeat\". " PA denied for Omeprazole. Patients plan states medication is not covered due to Proton Pump Inhibitors Quantity Limit program criteria. Patient is able to get up to 120 days within 365 days.   Support/Services in Place:  Pt reported she takes medications which she feels are working and she sees a Psychiatrist.   Services Needed/Recommended:  Continue with current services    Support System  Significant relationship at present time:  Pt's oldest son Sen whom is pt's main support and lives near pt.   Family of origin is available for support:  Yes. Pt has 8 children (2 of whom have passed) but has 4 sons still living and 2 daughters still living. Pt reported having 3 children whom live in pt's home area.   Other support available:  Services available at living facility  Gaps in support system:  Pt currently needs TCU  Patient is caregiver to:  None     Provider Information   Primary Care Physician:  Tracey Abdi   [phone]   Clinic:  [address] [address]      :  None but pt reported this may be available at her building.    Financial   Income Source:  Did not discuss  Financial Concerns:  None identified  Insurance:    Payor/Plan Subscriber Name Rel Member # Group #   MEDICARE - MEDICARE F* ALEX STERN  [id number]       ATTN CLAIMS, [address]   MEDICA - MEDICA PRIME* ALEX STERN  [id number]      [address] [address]       Discharge Plan   Patient and family discharge goal:  TCU then home  Provided education on discharge plan:  YES  Patient agreeable to discharge plan:  YES  A list of Medicare Certified Facilities was provided to the patient and/or family to encourage patient choice. Patient's choices for facility are:  Yes. Pt requested a referral to [facility] as it is close to her home.   -[facility]- referral sent via [employer], waiting to hear back.   Will NH provide Skilled rehabilitation or complex medical:  YES  General information regarding anticipated insurance coverage and possible out of pocket cost was discussed. Patient and patient's family are aware patient may incur the cost of transportation to the facility, pending  insurance payment: YES  Barriers to discharge:  Medical stability, unknown abx plan, bed availability/acceptance    Discharge Recommendations   Anticipated Disposition:  Facility:  TCU, location to be determined  Transportation Needs:  Family:  Pt reported one of her children can transport at discharge   Pt has [employer] that she uses to get to appointments.   Name of Transportation Company and Phone:  [employer] if needed- [phone]    LISA Benitez, MSW  7B   [phone] (pager) [phone]  10/3/2018

## 2019-12-13 NOTE — TELEPHONE ENCOUNTER
omeprazole is available OTC    Or does she need a subsitute  If yes which med is covered   IT is till going to be PPI   restricted

## 2020-05-22 ENCOUNTER — TELEPHONE (OUTPATIENT)
Dept: INTERNAL MEDICINE CLINIC | Facility: CLINIC | Age: 45
End: 2020-05-22

## 2020-05-22 ENCOUNTER — VIRTUAL PHONE E/M (OUTPATIENT)
Dept: INTERNAL MEDICINE CLINIC | Facility: CLINIC | Age: 45
End: 2020-05-22
Payer: MEDICAID

## 2020-05-22 DIAGNOSIS — Z00.00 ROUTINE GENERAL MEDICAL EXAMINATION AT A HEALTH CARE FACILITY: ICD-10-CM

## 2020-05-22 DIAGNOSIS — J01.90 ACUTE NON-RECURRENT SINUSITIS, UNSPECIFIED LOCATION: Primary | ICD-10-CM

## 2020-05-22 PROCEDURE — 99214 OFFICE O/P EST MOD 30 MIN: CPT | Performed by: INTERNAL MEDICINE

## 2020-05-22 RX ORDER — AZITHROMYCIN 500 MG/1
500 TABLET, FILM COATED ORAL DAILY
Qty: 5 TABLET | Refills: 1 | Status: SHIPPED | OUTPATIENT
Start: 2020-05-22 | End: 2020-05-27

## 2020-05-22 NOTE — PROGRESS NOTES
Virtual Telephone Check-In    Paul Meadows verbally consents to a Virtual/Telephone Check-In visit on 05/22/20. Patient has been referred to the [facility] website at [email or web address] to review the yearly Consent to Treat document.     Patient unders Negative for abdominal pain, blood in stool, constipation, diarrhea, nausea and vomiting. Genitourinary: Negative for difficulty urinating, dysuria, frequency, hematuria and urgency.    Musculoskeletal: Negative for back pain, gait problem and joint swell unspecified    • H/O seasonal allergies    • Migraines    • Morbid obesity with BMI of 50.0-59.9, adult ([facility] [address]    • ANJELICA on CPAP    • Ovarian cyst 2012   • Sleep apnea    • Varicose vein       Past Surgical History:   Procedure Laterality Date   • BREAST BIO voiced understanding  and agrees with plan          Meds This Visit:  Requested Prescriptions     Signed Prescriptions Disp Refills   • azithromycin (ZITHROMAX) 500 MG Oral Tab 5 tablet 1     Sig: Take 1 tablet (500 mg total) by mouth daily for 5 days.

## 2020-05-22 NOTE — TELEPHONE ENCOUNTER
Upon travel screen for visit today, pt reported sore throat  She was transferred to triage for further assessment, she stts she gets bad allergies every year, has been waking up with nasal congestion and sore throat for the past few weeks in the morning sl

## 2020-06-02 RX ORDER — KETOCONAZOLE 20 MG/ML
SHAMPOO TOPICAL
Qty: 120 ML | Refills: 0 | Status: SHIPPED | OUTPATIENT
Start: 2020-06-02 | End: 2020-06-25

## 2020-06-11 RX ORDER — RIZATRIPTAN BENZOATE 10 MG/1
TABLET ORAL
Qty: 27 TABLET | Refills: 0 | Status: SHIPPED | OUTPATIENT
Start: 2020-06-11 | End: 2021-01-21

## 2020-06-11 NOTE — TELEPHONE ENCOUNTER
Refill request for rizatriptan 10 mg, take 1-2 tab as onset of migraine, #27, no refills    LOV: 12/10/19  NOV: None

## 2020-06-23 ENCOUNTER — TELEPHONE (OUTPATIENT)
Dept: INTERNAL MEDICINE CLINIC | Facility: CLINIC | Age: 45
End: 2020-06-23

## 2020-06-23 DIAGNOSIS — Z12.31 BREAST CANCER SCREENING BY MAMMOGRAM: Primary | ICD-10-CM

## 2020-06-23 NOTE — TELEPHONE ENCOUNTER
Dr. Braxton Browne, patient is requesting a mammogram order. Last mammogram was completed 06/20/2019. Please advise on order. CONCLUSION:       BI-RADS CATEGORY:     DIAGNOSTIC CATEGORY 1--NEGATIVE ASSESSMENT. RECOMMENDATIONS:   ROUTINE MAMMOGRAM AND CLINICAL EVALUATION IN 12 MONTHS.

## 2020-06-25 ENCOUNTER — APPOINTMENT (OUTPATIENT)
Dept: LAB | Age: 45
End: 2020-06-25
Attending: INTERNAL MEDICINE
Payer: MEDICAID

## 2020-06-25 ENCOUNTER — OFFICE VISIT (OUTPATIENT)
Dept: INTERNAL MEDICINE CLINIC | Facility: CLINIC | Age: 45
End: 2020-06-25
Payer: MEDICAID

## 2020-06-25 ENCOUNTER — OFFICE VISIT (OUTPATIENT)
Dept: DERMATOLOGY CLINIC | Facility: CLINIC | Age: 45
End: 2020-06-25
Payer: MEDICAID

## 2020-06-25 VITALS
WEIGHT: 293 LBS | HEIGHT: 60 IN | TEMPERATURE: 97 F | SYSTOLIC BLOOD PRESSURE: 138 MMHG | HEART RATE: 73 BPM | DIASTOLIC BLOOD PRESSURE: 88 MMHG | BODY MASS INDEX: 57.52 KG/M2

## 2020-06-25 DIAGNOSIS — D23.9 BENIGN NEOPLASM OF SKIN, UNSPECIFIED LOCATION: ICD-10-CM

## 2020-06-25 DIAGNOSIS — Z00.00 ROUTINE GENERAL MEDICAL EXAMINATION AT A HEALTH CARE FACILITY: ICD-10-CM

## 2020-06-25 DIAGNOSIS — Z00.00 ROUTINE GENERAL MEDICAL EXAMINATION AT A HEALTH CARE FACILITY: Primary | ICD-10-CM

## 2020-06-25 DIAGNOSIS — D48.5 NEOPLASM OF UNCERTAIN BEHAVIOR OF SKIN: Primary | ICD-10-CM

## 2020-06-25 DIAGNOSIS — D23.4 BENIGN NEOPLASM OF SCALP AND SKIN OF NECK: ICD-10-CM

## 2020-06-25 DIAGNOSIS — D23.60 BENIGN NEOPLASM OF SKIN OF UPPER LIMB, INCLUDING SHOULDER, UNSPECIFIED LATERALITY: ICD-10-CM

## 2020-06-25 DIAGNOSIS — L40.8 SEBOPSORIASIS: ICD-10-CM

## 2020-06-25 DIAGNOSIS — E66.01 MORBID OBESITY (HCC): ICD-10-CM

## 2020-06-25 DIAGNOSIS — D23.5 BENIGN NEOPLASM OF SKIN OF TRUNK, EXCEPT SCROTUM: ICD-10-CM

## 2020-06-25 DIAGNOSIS — D23.30 BENIGN NEOPLASM OF SKIN OF FACE: ICD-10-CM

## 2020-06-25 PROCEDURE — 80061 LIPID PANEL: CPT

## 2020-06-25 PROCEDURE — 88305 TISSUE EXAM BY PATHOLOGIST: CPT | Performed by: DERMATOLOGY

## 2020-06-25 PROCEDURE — 82746 ASSAY OF FOLIC ACID SERUM: CPT

## 2020-06-25 PROCEDURE — 82306 VITAMIN D 25 HYDROXY: CPT

## 2020-06-25 PROCEDURE — 81015 MICROSCOPIC EXAM OF URINE: CPT

## 2020-06-25 PROCEDURE — 84439 ASSAY OF FREE THYROXINE: CPT

## 2020-06-25 PROCEDURE — 84443 ASSAY THYROID STIM HORMONE: CPT

## 2020-06-25 PROCEDURE — 85027 COMPLETE CBC AUTOMATED: CPT

## 2020-06-25 PROCEDURE — 99396 PREV VISIT EST AGE 40-64: CPT | Performed by: INTERNAL MEDICINE

## 2020-06-25 PROCEDURE — 80053 COMPREHEN METABOLIC PANEL: CPT

## 2020-06-25 PROCEDURE — 99213 OFFICE O/P EST LOW 20 MIN: CPT | Performed by: DERMATOLOGY

## 2020-06-25 PROCEDURE — 82607 VITAMIN B-12: CPT

## 2020-06-25 PROCEDURE — 83036 HEMOGLOBIN GLYCOSYLATED A1C: CPT

## 2020-06-25 PROCEDURE — 36415 COLL VENOUS BLD VENIPUNCTURE: CPT

## 2020-06-25 PROCEDURE — 11102 TANGNTL BX SKIN SINGLE LES: CPT | Performed by: DERMATOLOGY

## 2020-06-25 RX ORDER — KETOCONAZOLE 20 MG/ML
SHAMPOO TOPICAL
Qty: 120 ML | Refills: 12 | Status: SHIPPED | OUTPATIENT
Start: 2020-06-25

## 2020-06-25 RX ORDER — MOMETASONE FUROATE 1 MG/G
1 CREAM TOPICAL DAILY
Qty: 45 G | Refills: 1 | Status: SHIPPED | OUTPATIENT
Start: 2020-06-25

## 2020-06-25 RX ORDER — MULTIVIT-MIN/IRON/FOLIC ACID/K 18-600-40
CAPSULE ORAL
COMMUNITY

## 2020-06-29 NOTE — PROGRESS NOTES
The pathology report from last visit showed right chin-intradermal nevus    . Please log in test results, send biopsy results letter. Pt to rtc 1 year or prn.

## 2020-07-02 NOTE — PROGRESS NOTES
HPI:    Patient ID: Gladys Ponce is a 40year old female.     HPI     Physical exam    Complaint of inability to loose weight  Generally healthy    /88   Pulse 73   Temp 97.1 °F (36.2 °C) (Oral)   Ht 5' (1.524 m)   Wt 297 lb (134.7 kg)   LMP 05/10 • topiramate 50 MG Oral Tab 1 tablet nightly x 1 week, then 2 tablets nightly 60 tablet 3   • SUMAtriptan Succinate 100 MG Oral Tab 1/2 to 1 tablet at onset of migraine, no more than 2 tablets in 24 hours 9 tablet 3   • Minoxidil 2 % External Solution Ap Tobacco Use      Smoking status: Former Smoker        Years: 20.00        Types: Cigarettes        Quit date: 2013        Years since quittin.5      Smokeless tobacco: Former User    Alcohol use:  Yes      Alcohol/week: 0.0 standard drinks      Comm INTERNAL     Limit carb intake  Advised regular exercise and adequate sleep and water intake    Patient voiced understanding  and agrees with plan        Orders Placed This Encounter      Assay, Thyroid Stim Hormone      Free T4, (Free Thyroxine)      Lipi

## 2020-07-07 RX ORDER — OMEPRAZOLE 40 MG/1
40 CAPSULE, DELAYED RELEASE ORAL DAILY
Qty: 90 CAPSULE | Refills: 0 | Status: SHIPPED | OUTPATIENT
Start: 2020-07-07 | End: 2020-10-08

## 2020-07-20 RX ORDER — TOPIRAMATE 50 MG/1
TABLET, FILM COATED ORAL
Qty: 60 TABLET | Refills: 0 | OUTPATIENT
Start: 2020-07-20

## 2020-07-20 NOTE — TELEPHONE ENCOUNTER
Medication request: Topiramate 50 mg oral tab Take 2 tablets nightly qty 60     LOV 03/10/19      Last refill: 12/10/19 with 3 refills     RX Denied patient needs to schedule follow up appointment.

## 2020-07-29 ENCOUNTER — HOSPITAL ENCOUNTER (OUTPATIENT)
Dept: MAMMOGRAPHY | Age: 45
Discharge: HOME | End: 2020-07-29
Attending: INTERNAL MEDICINE
Payer: MEDICAID

## 2020-07-29 DIAGNOSIS — Z12.31 BREAST CANCER SCREENING BY MAMMOGRAM: ICD-10-CM

## 2020-07-29 PROCEDURE — 77063 BREAST TOMOSYNTHESIS BI: CPT | Performed by: INTERNAL MEDICINE

## 2020-07-29 PROCEDURE — 77067 SCR MAMMO BI INCL CAD: CPT | Performed by: INTERNAL MEDICINE

## 2020-08-04 ENCOUNTER — TELEPHONE (OUTPATIENT)
Dept: CASE MANAGEMENT | Age: 45
End: 2020-08-04

## 2020-08-04 DIAGNOSIS — G43.809 OTHER MIGRAINE WITHOUT STATUS MIGRAINOSUS, NOT INTRACTABLE: Primary | ICD-10-CM

## 2020-08-04 NOTE — TELEPHONE ENCOUNTER
Hi Dr. Unique Peterson called stating her neurologist, Dr. Kimberly Dewey, has left the practice and she is calling for a recommendation for a new neurologist.    Please give patient a call.     Thank you  Luz Calderon

## 2020-10-08 RX ORDER — OMEPRAZOLE 40 MG/1
CAPSULE, DELAYED RELEASE ORAL
Qty: 90 CAPSULE | Refills: 0 | Status: SHIPPED | OUTPATIENT
Start: 2020-10-08 | End: 2021-01-02

## 2020-11-03 ENCOUNTER — VIRTUAL PHONE E/M (OUTPATIENT)
Dept: INTERNAL MEDICINE CLINIC | Facility: CLINIC | Age: 45
End: 2020-11-03
Payer: MEDICAID

## 2020-11-03 DIAGNOSIS — R51.9 ACUTE NONINTRACTABLE HEADACHE, UNSPECIFIED HEADACHE TYPE: ICD-10-CM

## 2020-11-03 DIAGNOSIS — J01.90 ACUTE NON-RECURRENT SINUSITIS, UNSPECIFIED LOCATION: Primary | ICD-10-CM

## 2020-11-03 DIAGNOSIS — R52 BODY ACHES: ICD-10-CM

## 2020-11-03 PROCEDURE — 99213 OFFICE O/P EST LOW 20 MIN: CPT | Performed by: INTERNAL MEDICINE

## 2020-11-03 RX ORDER — AMOXICILLIN AND CLAVULANATE POTASSIUM 875; 125 MG/1; MG/1
1 TABLET, FILM COATED ORAL 2 TIMES DAILY
Qty: 20 TABLET | Refills: 0 | Status: SHIPPED | OUTPATIENT
Start: 2020-11-03 | End: 2020-11-13

## 2020-11-03 NOTE — PROGRESS NOTES
Patient ID: Glenn Mckinney is a 39year old female. Patient presents with:  Sick Call       Virtual/Telephone Check-In    Glenn Mckinney verbally consents to a Virtual/Telephone Check-In service on 11/03/20.  Patient understands and accepts financial r directed 3 times weekly, Disp: 120 mL, Rfl: 12  •  Rizatriptan Benzoate 10 MG Oral Tab, TAKE 1-2 TABLETS AT ONSET OF MIGRAINE, MAY REPEAT IN 1 HOUR : NO MORE THAN 4 TABLET IN 24 HOURS, Disp: 27 tablet, Rfl: 0  •  topiramate 50 MG Oral Tab, 1 tablet nightly Drug use: No      Sexual activity: Not Currently        Partners: Male    Lifestyle      Physical activity        Days per week: Not on file        Minutes per session: Not on file      Stress: Not on file    Relationships      Social connections        Ta location  · Amoxicillin-Pot Clavulanate 875-125 MG Oral Tab; Take 1 tablet by mouth 2 (two) times daily for 10 days. Dispense: 20 tablet; Refill: 0  · SARS-COV-2 BY PCR ();  Future  · Symptomatic treatment with hot showers, stimulation, Renitamarina gordon

## 2020-11-05 ENCOUNTER — LAB ENCOUNTER (OUTPATIENT)
Dept: LAB | Facility: HOSPITAL | Age: 45
End: 2020-11-05
Attending: INTERNAL MEDICINE
Payer: MEDICAID

## 2020-11-05 DIAGNOSIS — Z20.822 EXPOSURE TO COVID-19 VIRUS: Primary | ICD-10-CM

## 2021-01-02 RX ORDER — OMEPRAZOLE 40 MG/1
CAPSULE, DELAYED RELEASE ORAL
Qty: 90 CAPSULE | Refills: 0 | Status: SHIPPED | OUTPATIENT
Start: 2021-01-02 | End: 2021-04-09

## 2021-01-11 ENCOUNTER — NURSE ONLY (OUTPATIENT)
Dept: INTERNAL MEDICINE CLINIC | Facility: CLINIC | Age: 46
End: 2021-01-11
Payer: MEDICAID

## 2021-01-11 DIAGNOSIS — Z23 NEED FOR VACCINATION: ICD-10-CM

## 2021-01-11 PROCEDURE — 90471 IMMUNIZATION ADMIN: CPT | Performed by: INTERNAL MEDICINE

## 2021-01-11 PROCEDURE — 90686 IIV4 VACC NO PRSV 0.5 ML IM: CPT | Performed by: INTERNAL MEDICINE

## 2021-01-14 ENCOUNTER — OFFICE VISIT (OUTPATIENT)
Dept: INTERNAL MEDICINE CLINIC | Facility: CLINIC | Age: 46
End: 2021-01-14
Payer: MEDICAID

## 2021-01-14 VITALS
DIASTOLIC BLOOD PRESSURE: 83 MMHG | HEIGHT: 60 IN | HEART RATE: 77 BPM | WEIGHT: 293 LBS | TEMPERATURE: 99 F | SYSTOLIC BLOOD PRESSURE: 137 MMHG | BODY MASS INDEX: 57.52 KG/M2

## 2021-01-14 DIAGNOSIS — L72.9 INFECTED CYST OF SKIN: Primary | ICD-10-CM

## 2021-01-14 DIAGNOSIS — L08.9 INFECTED CYST OF SKIN: Primary | ICD-10-CM

## 2021-01-14 PROCEDURE — 99213 OFFICE O/P EST LOW 20 MIN: CPT | Performed by: INTERNAL MEDICINE

## 2021-01-14 PROCEDURE — 3079F DIAST BP 80-89 MM HG: CPT | Performed by: INTERNAL MEDICINE

## 2021-01-14 PROCEDURE — 3008F BODY MASS INDEX DOCD: CPT | Performed by: INTERNAL MEDICINE

## 2021-01-14 PROCEDURE — 3075F SYST BP GE 130 - 139MM HG: CPT | Performed by: INTERNAL MEDICINE

## 2021-01-14 RX ORDER — CEPHALEXIN 500 MG/1
500 CAPSULE ORAL 3 TIMES DAILY
Qty: 30 CAPSULE | Refills: 0 | Status: SHIPPED | OUTPATIENT
Start: 2021-01-14 | End: 2021-01-24

## 2021-01-14 NOTE — PROGRESS NOTES
Patient ID: Danielle Chappell is a 39year old female. Patient presents with:  Lump: Pt reports a grape size lump on right side of collar bone, noticed yesterday.  Pt states lump is painful        HISTORY OF PRESENT ILLNESS:   HPI  Patient presents for abo Application topically daily. Apply a thin film to the affected area(s). , Disp: 45 g, Rfl: 1  •  Ketoconazole 2 % External Shampoo, Use to shampoo as directed 3 times weekly, Disp: 120 mL, Rfl: 12  •  Rizatriptan Benzoate 10 MG Oral Tab, TAKE 1-2 TABLETS AT use: No      Sexual activity: Not Currently        Partners: Male    Lifestyle      Physical activity        Days per week: Not on file        Minutes per session: Not on file      Stress: Not on file    Relationships      Social connections        Talks o Normal rate, regular rhythm and normal heart sounds. Pulmonary/Chest: Effort normal and breath sounds normal. No respiratory distress. She has no wheezes. She has no rales. Neurological: She is alert.    Psychiatric: She has a normal mood and affect

## 2021-01-21 ENCOUNTER — OFFICE VISIT (OUTPATIENT)
Dept: NEUROLOGY | Facility: CLINIC | Age: 46
End: 2021-01-21
Payer: MEDICAID

## 2021-01-21 ENCOUNTER — PATIENT MESSAGE (OUTPATIENT)
Dept: NEUROLOGY | Facility: CLINIC | Age: 46
End: 2021-01-21

## 2021-01-21 ENCOUNTER — OFFICE VISIT (OUTPATIENT)
Dept: INTERNAL MEDICINE CLINIC | Facility: CLINIC | Age: 46
End: 2021-01-21
Payer: MEDICAID

## 2021-01-21 VITALS
SYSTOLIC BLOOD PRESSURE: 148 MMHG | TEMPERATURE: 98 F | HEIGHT: 60 IN | DIASTOLIC BLOOD PRESSURE: 94 MMHG | HEART RATE: 76 BPM | BODY MASS INDEX: 57.52 KG/M2 | WEIGHT: 293 LBS

## 2021-01-21 VITALS
WEIGHT: 293 LBS | HEIGHT: 60 IN | BODY MASS INDEX: 57.52 KG/M2 | DIASTOLIC BLOOD PRESSURE: 70 MMHG | SYSTOLIC BLOOD PRESSURE: 126 MMHG

## 2021-01-21 DIAGNOSIS — L08.9 INFECTED CYST OF SKIN: Primary | ICD-10-CM

## 2021-01-21 DIAGNOSIS — F32.A DEPRESSION, UNSPECIFIED DEPRESSION TYPE: ICD-10-CM

## 2021-01-21 DIAGNOSIS — L72.9 INFECTED CYST OF SKIN: Primary | ICD-10-CM

## 2021-01-21 DIAGNOSIS — G43.011 INTRACTABLE MIGRAINE WITHOUT AURA AND WITH STATUS MIGRAINOSUS: Primary | ICD-10-CM

## 2021-01-21 PROCEDURE — 3078F DIAST BP <80 MM HG: CPT | Performed by: OTHER

## 2021-01-21 PROCEDURE — 3077F SYST BP >= 140 MM HG: CPT | Performed by: INTERNAL MEDICINE

## 2021-01-21 PROCEDURE — 3008F BODY MASS INDEX DOCD: CPT | Performed by: INTERNAL MEDICINE

## 2021-01-21 PROCEDURE — 99213 OFFICE O/P EST LOW 20 MIN: CPT | Performed by: INTERNAL MEDICINE

## 2021-01-21 PROCEDURE — 3008F BODY MASS INDEX DOCD: CPT | Performed by: OTHER

## 2021-01-21 PROCEDURE — 3074F SYST BP LT 130 MM HG: CPT | Performed by: OTHER

## 2021-01-21 PROCEDURE — 3080F DIAST BP >= 90 MM HG: CPT | Performed by: INTERNAL MEDICINE

## 2021-01-21 PROCEDURE — 99214 OFFICE O/P EST MOD 30 MIN: CPT | Performed by: OTHER

## 2021-01-21 RX ORDER — TOPIRAMATE 25 MG/1
TABLET ORAL
Qty: 67 TABLET | Refills: 0 | Status: SHIPPED | OUTPATIENT
Start: 2021-01-21 | End: 2021-03-17

## 2021-01-21 RX ORDER — ALMOTRIPTAN MALATE 12.5 MG/1
TABLET, COATED ORAL
Qty: 8 TABLET | Refills: 0 | Status: SHIPPED | OUTPATIENT
Start: 2021-01-21 | End: 2021-04-09

## 2021-01-21 NOTE — PROGRESS NOTES
Sima Dub [address]  [address]  [phone]          Follow Up Visit       Date: January 21, 2021  Patient Name: Simran Marvin   MRN: [id number]  Primary physician: LEE March 9 6 for 10 days. , Disp: 30 capsule, Rfl: 0    •  OMEPRAZOLE 40 MG Oral Capsule Delayed Release, TAKE ONE CAPSULE BY MOUTH ONE TIME DAILY , Disp: 90 capsule, Rfl: 0    •  Cholecalciferol (VITAMIN D) 50 MCG (2000 UT) Oral Cap, Take by mouth., Disp: , Rfl:     • available     ASSESSMENT:  The patient is a 39year old woman with a history of migraine, depression, ANJELICA who presents for follow up regarding intractable migraines w/ repeated episodes of status migrainosus.   She has about 14 migraine days per month and m

## 2021-01-21 NOTE — PROGRESS NOTES
Patient ID: Eric Meyers is a 39year old female. Patient presents with: Follow - Up: 1 week f/u on lump, per patient no longer having pain.  pt reports lump is now closer to midline of chest.         HISTORY OF PRESENT ILLNESS:   HPI  Patient presen •  cephALEXin 500 MG Oral Cap, Take 1 capsule (500 mg total) by mouth 3 (three) times daily for 10 days. , Disp: 30 capsule, Rfl: 0  •  OMEPRAZOLE 40 MG Oral Capsule Delayed Release, TAKE ONE CAPSULE BY MOUTH ONE TIME DAILY , Disp: 90 capsule, Rfl: 0  •  Ch Alcohol use:  Yes        Alcohol/week: 0.0 standard drinks        Comment: RARELY, 2 times per year      Drug use: No      Sexual activity: Not Currently        Partners: Male    Lifestyle      Physical activity        Days per week: Not on file Body mass index is 60.35 kg/m². Physical Exam   Constitutional: She appears well-developed and well-nourished. Cardiovascular: Normal rate, regular rhythm and normal heart sounds.    Pulmonary/Chest: Effort normal and breath sounds normal. No respirato

## 2021-01-21 NOTE — TELEPHONE ENCOUNTER
Spoke to patient, advised of needing to  stop topiramate if becomes pregnant. Expressed understanding.

## 2021-01-21 NOTE — PATIENT INSTRUCTIONS
-Preventive: Topiramate 25mg at bedtime x 1 week, then increase to 2 tablets at bedtime. Take this every day. -Rescue: take when you have a migraine - take these altogether. Repeat in 6 hours as needed once.     Almotriptan 12.5mg + Reglan 10mg + Benadryl INFORMATION     Headache Preventive Treatment:   Please keep in mind that it takes 4-6 weeks for the medication to start working well and 2-3 months at the appropriate dose before deciding if it will be useful or not.  If it is not helping at all by this ti native to [facility] and popular in [facility]  as a treatment for disorders typically controlled by aspirin.   The mechanism of action is unknown but is believed to be related to a chemical called parthenolide which helps the body use serotonin more effecti Foods and beverages which may trigger migraine  Note that only 20% of headache patients are food sensitive. You will know if you are food sensitive if you get a headache consistently 20 minutes to 2 hours after eating a certain food.  Only cut out a food if balance between desensitization and reduction in overly strong input. Use dark polarized glasses outside, but not inside. Avoid bright or fluorescent light, but do not dim environment to the point that going into a normally lit room hurts.  Consider FL-41 t methods, including medication, behavioral therapy, psychological counselling, biofeedback, massage therapy, acupuncture, dry needling, and other modalities. Such measures may reduce the need for medications.  Counseling for pain management, where patients to how epilepsy is an electrical brain disorders). Therefore, there is no testing that will reveal this \"dysfunctional electrical circuitry\" such on MRI, or other testing.   We try to find a medication that may help lessen the frequency and/or severity o nursing staff, and medical assistants are a major part of [place] [address] and will be handling your phone calls and inquiries, if any.  Unless explicitly told otherwise at the time of your office visit, your study results and ensuing treatment plans angie

## 2021-01-25 ENCOUNTER — TELEPHONE (OUTPATIENT)
Dept: INTERNAL MEDICINE CLINIC | Facility: CLINIC | Age: 46
End: 2021-01-25

## 2021-01-25 NOTE — TELEPHONE ENCOUNTER
Patient called to update Dr. Jacinto Hahn on the ultrasound. Patients insurance still has not authorized this so she had to book it a week out. She was hoping there was a way to get it sped up so she could get the ultrasound quicker.

## 2021-01-25 NOTE — TELEPHONE ENCOUNTER
Managed care, please assist with Referral # [id number]    Patient was informed of managed care contact information.

## 2021-02-02 ENCOUNTER — HOSPITAL ENCOUNTER (OUTPATIENT)
Dept: ULTRASOUND IMAGING | Age: 46
Discharge: HOME | End: 2021-02-02
Attending: INTERNAL MEDICINE
Payer: MEDICAID

## 2021-02-02 DIAGNOSIS — L72.9 INFECTED CYST OF SKIN: ICD-10-CM

## 2021-02-02 DIAGNOSIS — L08.9 INFECTED CYST OF SKIN: ICD-10-CM

## 2021-02-02 PROCEDURE — 76536 US EXAM OF HEAD AND NECK: CPT | Performed by: INTERNAL MEDICINE

## 2021-02-04 ENCOUNTER — TELEPHONE (OUTPATIENT)
Dept: NEUROLOGY | Facility: CLINIC | Age: 46
End: 2021-02-04

## 2021-02-04 NOTE — TELEPHONE ENCOUNTER
Per patient the pharmacy is waiting to hear from us regarding the P.A. for (Almotriptan Malate 12.5 MG Oral Tab). Please send it, call her with any questions.

## 2021-02-05 ENCOUNTER — OFFICE VISIT (OUTPATIENT)
Dept: OBGYN CLINIC | Facility: CLINIC | Age: 46
End: 2021-02-05
Payer: MEDICAID

## 2021-02-05 ENCOUNTER — LAB ENCOUNTER (OUTPATIENT)
Dept: LAB | Facility: HOSPITAL | Age: 46
End: 2021-02-05
Attending: INTERNAL MEDICINE
Payer: MEDICAID

## 2021-02-05 VITALS
WEIGHT: 293 LBS | DIASTOLIC BLOOD PRESSURE: 78 MMHG | SYSTOLIC BLOOD PRESSURE: 121 MMHG | BODY MASS INDEX: 60 KG/M2 | HEART RATE: 81 BPM

## 2021-02-05 DIAGNOSIS — L08.9 INFECTED CYST OF SKIN: ICD-10-CM

## 2021-02-05 DIAGNOSIS — L72.9 INFECTED CYST OF SKIN: ICD-10-CM

## 2021-02-05 DIAGNOSIS — Z12.4 SCREENING FOR MALIGNANT NEOPLASM OF CERVIX: ICD-10-CM

## 2021-02-05 DIAGNOSIS — Z12.31 ENCOUNTER FOR SCREENING MAMMOGRAM FOR BREAST CANCER: ICD-10-CM

## 2021-02-05 DIAGNOSIS — Z01.419 ENCOUNTER FOR GYNECOLOGICAL EXAMINATION: Primary | ICD-10-CM

## 2021-02-05 LAB
BASOPHILS # BLD AUTO: 0.03 X10(3) UL (ref 0–0.2)
BASOPHILS NFR BLD AUTO: 0.5 %
DEPRECATED RDW RBC AUTO: 41.5 FL (ref 35.1–46.3)
EOSINOPHIL # BLD AUTO: 0.18 X10(3) UL (ref 0–0.7)
EOSINOPHIL NFR BLD AUTO: 2.8 %
ERYTHROCYTE [DISTWIDTH] IN BLOOD BY AUTOMATED COUNT: 13.3 % (ref 11–15)
HCT VFR BLD AUTO: 44.8 %
HGB BLD-MCNC: 14.2 G/DL
IMM GRANULOCYTES # BLD AUTO: 0.01 X10(3) UL (ref 0–1)
IMM GRANULOCYTES NFR BLD: 0.2 %
LYMPHOCYTES # BLD AUTO: 1.89 X10(3) UL (ref 1–4)
LYMPHOCYTES NFR BLD AUTO: 29.2 %
MCH RBC QN AUTO: 27.3 PG (ref 26–34)
MCHC RBC AUTO-ENTMCNC: 31.7 G/DL (ref 31–37)
MCV RBC AUTO: 86.2 FL
MONOCYTES # BLD AUTO: 0.47 X10(3) UL (ref 0.1–1)
MONOCYTES NFR BLD AUTO: 7.3 %
NEUTROPHILS # BLD AUTO: 3.9 X10 (3) UL (ref 1.5–7.7)
NEUTROPHILS # BLD AUTO: 3.9 X10(3) UL (ref 1.5–7.7)
NEUTROPHILS NFR BLD AUTO: 60 %
PLATELET # BLD AUTO: 275 10(3)UL (ref 150–450)
RBC # BLD AUTO: 5.2 X10(6)UL
WBC # BLD AUTO: 6.5 X10(3) UL (ref 4–11)

## 2021-02-05 PROCEDURE — 3078F DIAST BP <80 MM HG: CPT | Performed by: OBSTETRICS & GYNECOLOGY

## 2021-02-05 PROCEDURE — 99396 PREV VISIT EST AGE 40-64: CPT | Performed by: OBSTETRICS & GYNECOLOGY

## 2021-02-05 PROCEDURE — 36415 COLL VENOUS BLD VENIPUNCTURE: CPT

## 2021-02-05 PROCEDURE — 85025 COMPLETE CBC W/AUTO DIFF WBC: CPT

## 2021-02-05 PROCEDURE — 3074F SYST BP LT 130 MM HG: CPT | Performed by: OBSTETRICS & GYNECOLOGY

## 2021-02-05 RX ORDER — VENLAFAXINE HYDROCHLORIDE 75 MG/1
75 CAPSULE, EXTENDED RELEASE ORAL DAILY
Qty: 90 CAPSULE | Refills: 1 | Status: SHIPPED | OUTPATIENT
Start: 2021-02-05 | End: 2021-08-01

## 2021-02-05 NOTE — PROGRESS NOTES
Leeann Cleveland is a 39year old female [id number] Patient's last menstrual period was 10/20/2020. here for annual exam.       Last seen 4/4/2019. Last pap 4/2019-- ASCUS with neg HPV. Had colpo in 9/2017-- EUN 1. Menses q 2 months.   No period since 10 degeneration Neg    • Glaucoma Neg    • Retinal detachment Neg        MEDICATIONS:  Current Outpatient Medications   Medication Sig Dispense Refill   • Venlafaxine HCl ER 75 MG Oral Capsule SR 24 Hr Take 1 capsule (75 mg total) by mouth daily.  90 capsule 1 Denies any breast pain, lumps, or discharge. Neurological:  denies headaches, extremity weakness or numbness. Psychiatric: denies depression or anxiety. Endocrine:   denies excessive thirst or urination. Heme/Lymph:  easy bruising or bleeding.     PHYS Capsule SR 24 Hr 90 capsule 1     Sig: Take 1 capsule (75 mg total) by mouth daily.          Daniel Trejo MD  2/5/2021  4:36 PM

## 2021-02-08 LAB — HPV I/H RISK 1 DNA SPEC QL NAA+PROBE: NEGATIVE

## 2021-02-10 NOTE — TELEPHONE ENCOUNTER
Appears the Almotriptan was denied due to patient not trying Naproxen yet. On review of chart, she has had GERD in the past (2019) and was instructed to avoid Naproxen.  She has been trying Ibuprofen and could have added Naproxen to her migraine rescue m

## 2021-02-10 NOTE — TELEPHONE ENCOUNTER
PA for almotriptan 12.5 mg was approved through 2/10/2022. Jessica Herr at [facility] notified and will get it ready for patient.

## 2021-02-26 ENCOUNTER — HOSPITAL ENCOUNTER (OUTPATIENT)
Age: 46
Discharge: HOME | End: 2021-02-26
Attending: EMERGENCY MEDICINE
Payer: MEDICAID

## 2021-02-26 ENCOUNTER — APPOINTMENT (OUTPATIENT)
Dept: GENERAL RADIOLOGY | Age: 46
End: 2021-02-26
Attending: EMERGENCY MEDICINE
Payer: MEDICAID

## 2021-02-26 VITALS
HEART RATE: 90 BPM | OXYGEN SATURATION: 99 % | RESPIRATION RATE: 20 BRPM | TEMPERATURE: 98 F | SYSTOLIC BLOOD PRESSURE: 125 MMHG | DIASTOLIC BLOOD PRESSURE: 81 MMHG

## 2021-02-26 DIAGNOSIS — S83.92XA SPRAIN OF LEFT KNEE, UNSPECIFIED LIGAMENT, INITIAL ENCOUNTER: Primary | ICD-10-CM

## 2021-02-26 DIAGNOSIS — M17.11 ARTHRITIS OF RIGHT KNEE: ICD-10-CM

## 2021-02-26 PROCEDURE — 73562 X-RAY EXAM OF KNEE 3: CPT | Performed by: EMERGENCY MEDICINE

## 2021-02-26 PROCEDURE — 99213 OFFICE O/P EST LOW 20 MIN: CPT

## 2021-02-26 PROCEDURE — 99214 OFFICE O/P EST MOD 30 MIN: CPT

## 2021-02-27 NOTE — ED PROVIDER NOTES
Patient Seen in: [facility]      History   Patient presents with:  Knee Pain    Stated Complaint: pain in both knees (left knee is worse)    HPI/Subjective:   HPI    The patient is a 20-year-old female with a past history of obesity who prese noted above.     Physical Exam     ED Triage Vitals [02/26/21 1806]   /81   Pulse 90   Resp 20   Temp 97.5 °F (36.4 °C)   Temp src Temporal   SpO2 99 %   O2 Device None (Room air)       Current:/81   Pulse 90   Temp 97.5 °F (36.4 °C) (Temporal) Impression:  Sprain of left knee, unspecified ligament, initial encounter  (primary encounter diagnosis)  Arthritis of right knee    Disposition:  Discharge  2/26/2021  6:55 pm    Follow-up:  Baltazar Vera MD  [address] [place] [address]

## 2021-02-27 NOTE — ED INITIAL ASSESSMENT (HPI)
Patient slid and tried to catch herself from falling twisting both knees yesterday. Felt \"pop\" sensation to left knee. Left knee pain worse than right. No fall. No head injury.  States pain was so severe she could not get out of bed all day after resting

## 2021-03-08 ENCOUNTER — HOSPITAL ENCOUNTER (OUTPATIENT)
Dept: GENERAL RADIOLOGY | Facility: HOSPITAL | Age: 46
Discharge: HOME | End: 2021-03-08
Attending: ORTHOPAEDIC SURGERY
Payer: MEDICAID

## 2021-03-08 ENCOUNTER — OFFICE VISIT (OUTPATIENT)
Dept: ORTHOPEDICS CLINIC | Facility: CLINIC | Age: 46
End: 2021-03-08
Payer: MEDICAID

## 2021-03-08 VITALS — WEIGHT: 293 LBS | HEIGHT: 60 IN | BODY MASS INDEX: 57.52 KG/M2

## 2021-03-08 DIAGNOSIS — M25.562 ACUTE PAIN OF BOTH KNEES: ICD-10-CM

## 2021-03-08 DIAGNOSIS — M25.561 ACUTE PAIN OF BOTH KNEES: ICD-10-CM

## 2021-03-08 DIAGNOSIS — S83.412A SPRAIN OF MEDIAL COLLATERAL LIGAMENT OF LEFT KNEE, INITIAL ENCOUNTER: Primary | ICD-10-CM

## 2021-03-08 DIAGNOSIS — M17.0 BILATERAL PRIMARY OSTEOARTHRITIS OF KNEE: ICD-10-CM

## 2021-03-08 PROCEDURE — 3008F BODY MASS INDEX DOCD: CPT | Performed by: ORTHOPAEDIC SURGERY

## 2021-03-08 PROCEDURE — 99243 OFF/OP CNSLTJ NEW/EST LOW 30: CPT | Performed by: ORTHOPAEDIC SURGERY

## 2021-03-08 PROCEDURE — 73562 X-RAY EXAM OF KNEE 3: CPT | Performed by: ORTHOPAEDIC SURGERY

## 2021-03-08 RX ORDER — MELOXICAM 15 MG/1
15 TABLET ORAL DAILY
Qty: 30 TABLET | Refills: 0 | Status: SHIPPED | OUTPATIENT
Start: 2021-03-08 | End: 2021-06-25

## 2021-03-08 NOTE — H&P
NURSING INTAKE COMMENTS: Patient presents with:  Consult: bilateral knee pain ,patient twisted knees on the ice ,left side is worse patient heard a popping sound ,right knee also hurts because patient is favering her left knee      HPI: This 39year old fe • Mometasone Furoate 0.1 % External Cream Apply 1 Application topically daily. Apply a thin film to the affected area(s).  45 g 1   • Ketoconazole 2 % External Shampoo Use to shampoo as directed 3 times weekly 120 mL 12   • Minoxidil 2 % External Solution emesis, no diarrhea  :  denies dysuria or difficulty urinating  MUSCULOSKELETAL: See HPI  NEURO: See HPI  PSYCHE: History of depression  HEMATOLOGIC: Denies hx of blood clots, or easy bleeding    Physical Examination:    Ht 5' (1.524 m)   Wt 300 lb (136. visible soft tissue swelling. EFFUSION: None visible. OTHER: Negative. CONCLUSION:  1. Minimal DJD with no significant change since previous study. 2. No acute findings.     Dictated by (CST): Radha Perez MD on 2/26/2021 at 6:40 PM     F bilateral knee sprain. TECHNIQUE: 3 views were obtained. FINDINGS:  BONES: There is no fracture or dislocation. There is stable joint space narrowing in the medial compartments of both knees, moderate on the right and mild on the left.   There is stable Mobic and wrote a prescription for this. If the patient is having persistent symptoms despite these interventions she can follow-up with me for corticosteroid injections she is amenable to this plan of care.     The above note was creating using Dragon spe

## 2021-03-17 RX ORDER — TOPIRAMATE 25 MG/1
50 TABLET ORAL EVERY EVENING
Qty: 60 TABLET | Refills: 3 | Status: SHIPPED | OUTPATIENT
Start: 2021-03-17 | End: 2021-07-07

## 2021-03-17 NOTE — TELEPHONE ENCOUNTER
Refill request for topamax 25 mg, take 2 tabs daily, #60, 3 refills    LOV: 1/21/21  NOV: none  Last refilled on 1/21/21 for 67 tabs

## 2021-03-26 ENCOUNTER — TELEPHONE (OUTPATIENT)
Dept: PHYSICAL THERAPY | Facility: HOSPITAL | Age: 46
End: 2021-03-26

## 2021-04-01 ENCOUNTER — OFFICE VISIT (OUTPATIENT)
Dept: PHYSICAL THERAPY | Age: 46
End: 2021-04-01
Attending: ORTHOPAEDIC SURGERY
Payer: MEDICAID

## 2021-04-01 DIAGNOSIS — M17.0 BILATERAL PRIMARY OSTEOARTHRITIS OF KNEE: ICD-10-CM

## 2021-04-01 DIAGNOSIS — S83.412A SPRAIN OF MEDIAL COLLATERAL LIGAMENT OF LEFT KNEE, INITIAL ENCOUNTER: ICD-10-CM

## 2021-04-01 PROCEDURE — 97110 THERAPEUTIC EXERCISES: CPT | Performed by: PHYSICAL THERAPIST

## 2021-04-01 PROCEDURE — 97162 PT EVAL MOD COMPLEX 30 MIN: CPT | Performed by: PHYSICAL THERAPIST

## 2021-04-01 NOTE — PROGRESS NOTES
KNEE EVALUATION:   Referring Physician: Dr. Whit Story  Diagnosis:  Bilateral primary osteoarthritis of knee (M17.0)  Sprain of medial collateral ligament of left knee, initial encounter (B65.567H)   Onset Date 2/26/21 Evaluation Date: 4/1/2021  Visit # 1 yourself? No    Have you tried to hurt yourself in the past?  No               ASSESSMENT:   Qi Gan presents to physical therapy evaluation with primary c/o B knee pain.  The results of the objective tests and measures show decreased B knee AROM, decrease involved/activity limitations, and evolving symptoms including changing pain levels. PLAN OF CARE:    Goals: To be met in 4-6 weeks  1. Pt will improve knee extension ROM to 0 deg to allow proper heel strike during gait and terminal knee extension

## 2021-04-06 ENCOUNTER — OFFICE VISIT (OUTPATIENT)
Dept: PHYSICAL THERAPY | Age: 46
End: 2021-04-06
Attending: ORTHOPAEDIC SURGERY
Payer: MEDICAID

## 2021-04-06 DIAGNOSIS — S83.412A SPRAIN OF MEDIAL COLLATERAL LIGAMENT OF LEFT KNEE, INITIAL ENCOUNTER: ICD-10-CM

## 2021-04-06 DIAGNOSIS — M17.0 BILATERAL PRIMARY OSTEOARTHRITIS OF KNEE: ICD-10-CM

## 2021-04-06 PROCEDURE — 97110 THERAPEUTIC EXERCISES: CPT

## 2021-04-07 NOTE — PROGRESS NOTES
Dx: Bilateral primary osteoarthritis of knee , sprain of medial collateral ligament of left knee , initial encounter            Insurance   BCCom          POC# of visits: 10 visits          Authorized  # visits by insurance  :  18 visits   Expiration date Charges: ex's 3       Total Timed Treatment: 40 min  Total Treatment Time: 45 min  Plan: continue as per current POC .

## 2021-04-08 ENCOUNTER — TELEPHONE (OUTPATIENT)
Dept: PHYSICAL THERAPY | Facility: HOSPITAL | Age: 46
End: 2021-04-08

## 2021-04-08 ENCOUNTER — APPOINTMENT (OUTPATIENT)
Dept: PHYSICAL THERAPY | Age: 46
End: 2021-04-08
Attending: ORTHOPAEDIC SURGERY
Payer: MEDICAID

## 2021-04-11 RX ORDER — OMEPRAZOLE 40 MG/1
40 CAPSULE, DELAYED RELEASE ORAL DAILY
Qty: 90 CAPSULE | Refills: 0 | Status: SHIPPED | OUTPATIENT
Start: 2021-04-11 | End: 2021-07-07

## 2021-04-12 NOTE — TELEPHONE ENCOUNTER
Refill request for almotriptan 12.5 mg, take 1 tab at onset of migraine, #8, no refills    LOV: 1/21/21  NOV: none  Last refilled on 2/10/21 per pharmacy

## 2021-04-13 ENCOUNTER — OFFICE VISIT (OUTPATIENT)
Dept: PHYSICAL THERAPY | Age: 46
End: 2021-04-13
Attending: ORTHOPAEDIC SURGERY
Payer: MEDICAID

## 2021-04-13 PROCEDURE — 97110 THERAPEUTIC EXERCISES: CPT

## 2021-04-13 RX ORDER — ALMOTRIPTAN MALATE 12.5 MG/1
TABLET, COATED ORAL
Qty: 8 TABLET | Refills: 0 | Status: SHIPPED | OUTPATIENT
Start: 2021-04-13 | End: 2021-05-07

## 2021-04-13 NOTE — PROGRESS NOTES
Dx: Bilateral primary osteoarthritis of knee , sprain of medial collateral ligament of left knee , initial encounter            Insurance   BCCom          POC# of visits: 10 visits          Authorized  # visits by insurance  :  14 visits   Expiration date Theraex: Theraex:   Manual: Manual: Manual: Manual: Manual:   Gait/NMRed: Gait/NMRed: Gait/NMRed: Gait/NMRed: Gait/NMRed:   Others: Others: Others: Others:  Others:     HEP: ( see pt instructions )     Charges: ex's 3       Total Timed Treatment: 40 min  To

## 2021-04-15 ENCOUNTER — OFFICE VISIT (OUTPATIENT)
Dept: PHYSICAL THERAPY | Age: 46
End: 2021-04-15
Attending: ORTHOPAEDIC SURGERY
Payer: MEDICAID

## 2021-04-15 PROCEDURE — 97110 THERAPEUTIC EXERCISES: CPT

## 2021-04-15 NOTE — PROGRESS NOTES
Dx: Bilateral primary osteoarthritis of knee , sprain of medial collateral ligament of left knee , initial encounter            Insurance   BCCom          POC# of visits: 10 visits          Authorized  # visits by insurance  :  14 visits   Expiration date step x 8 min , level 3  Seated :   LAQ R/L x 10  Supine :   QS R/L x 10  SLR R/L x 10  R/L knee extension on bolster x 10  SL : R/L hip   Clam shell x 10  Hip abduction x 10  Standing : R/L TKE against ball x 10   Theraex:  NU step x 6 min , level 4  Seate

## 2021-04-16 ENCOUNTER — IMMUNIZATION (OUTPATIENT)
Dept: LAB | Age: 46
End: 2021-04-16
Attending: HOSPITALIST
Payer: MEDICAID

## 2021-04-16 DIAGNOSIS — Z23 NEED FOR VACCINATION: Primary | ICD-10-CM

## 2021-04-16 PROCEDURE — 0001A SARSCOV2 VAC 30MCG/0.3ML IM: CPT

## 2021-04-20 ENCOUNTER — OFFICE VISIT (OUTPATIENT)
Dept: PHYSICAL THERAPY | Age: 46
End: 2021-04-20
Attending: ORTHOPAEDIC SURGERY
Payer: MEDICAID

## 2021-04-20 PROCEDURE — 97110 THERAPEUTIC EXERCISES: CPT

## 2021-04-20 NOTE — PROGRESS NOTES
Dx: Bilateral primary osteoarthritis of knee , sprain of medial collateral ligament of left knee , initial encounter            Insurance   BCCom          POC# of visits: 10 visits          Authorized  # visits by insurance  :  14 visits   Expiration date 10  Standing :   Repeated knee extension with patient OP R/L x 10   Theraex:  NU step x 8 min , level 3  Seated :   LAQ R/L x 10  Supine :   QS R/L x 10  SLR R/L x 10  R/L knee extension on bolster x 10  SL : R/L hip   Clam shell x 10  Hip abduction x 10

## 2021-04-22 ENCOUNTER — OFFICE VISIT (OUTPATIENT)
Dept: PHYSICAL THERAPY | Age: 46
End: 2021-04-22
Attending: ORTHOPAEDIC SURGERY
Payer: MEDICAID

## 2021-04-22 PROCEDURE — 97110 THERAPEUTIC EXERCISES: CPT

## 2021-04-22 NOTE — PROGRESS NOTES
Dx: Bilateral primary osteoarthritis of knee , sprain of medial collateral ligament of left knee , initial encounter            Insurance   BCCom          POC# of visits: 10 visits          Authorized  # visits by insurance  :  14 visits   Expiration date ascend 1 flight of stairs reciprocally without UE assist.   4.Pt will increase hip and knee strength to grossly 4+/5 to be able to get up and down from the floor safely . 5. Pt will be independent and compliant with comprehensive HEP to maintain progress a pt instructions )     Charges: ex's 3       Total Timed Treatment: 40 min  Total Treatment Time: 45 min  Plan: continue as per current POC .

## 2021-04-29 ENCOUNTER — OFFICE VISIT (OUTPATIENT)
Dept: PHYSICAL THERAPY | Age: 46
End: 2021-04-29
Attending: ORTHOPAEDIC SURGERY
Payer: MEDICAID

## 2021-04-29 PROCEDURE — 97110 THERAPEUTIC EXERCISES: CPT | Performed by: PHYSICAL THERAPIST

## 2021-04-29 NOTE — PROGRESS NOTES
Dx: Bilateral primary osteoarthritis of knee , sprain of medial collateral ligament of left knee , initial encounter            Insurance 14  BCCom          POC# of visits: 10 visits          Authorized  # visits by insurance  :  14 visits   Expiration any TX#: 10/14   Date:    Tx#: 11/14   Theraex:  NU step x 8 min , level 5  Seated :  R /L LAQ x 20  Standing :  Hamstring stretch on stairs 30sec x3 R/L  Knee flexion at stairs 10sec x10 R/L  Heel raises x 20  Leuko tape to B knees lateral to medial       Ma

## 2021-05-04 ENCOUNTER — OFFICE VISIT (OUTPATIENT)
Dept: PHYSICAL THERAPY | Age: 46
End: 2021-05-04
Attending: INTERNAL MEDICINE
Payer: MEDICAID

## 2021-05-04 PROCEDURE — 97110 THERAPEUTIC EXERCISES: CPT

## 2021-05-04 NOTE — PROGRESS NOTES
Dx: Bilateral primary osteoarthritis of knee , sprain of medial collateral ligament of left knee , initial encounter            Insurance 14  BCCom          POC# of visits: 10 visits          Authorized  # visits by insurance  :  14 visits   Expiration any 5  Seated :  R /L LAQ x 20  Standing :  Hamstring stretch on stairs 30sec x3 R/L  Knee flexion at stairs 10sec x10 R/L  Heel raises x 20  Leuko tape to B knees lateral to medial Theraex :  NU step x 8 min , level 5  Seated :  R/L LAQ x 20  Standing :   HS

## 2021-05-07 ENCOUNTER — IMMUNIZATION (OUTPATIENT)
Dept: LAB | Age: 46
End: 2021-05-07
Attending: HOSPITALIST
Payer: MEDICAID

## 2021-05-07 DIAGNOSIS — Z23 NEED FOR VACCINATION: Primary | ICD-10-CM

## 2021-05-07 PROCEDURE — 0002A SARSCOV2 VAC 30MCG/0.3ML IM: CPT

## 2021-05-10 ENCOUNTER — OFFICE VISIT (OUTPATIENT)
Dept: ORTHOPEDICS CLINIC | Facility: CLINIC | Age: 46
End: 2021-05-10
Payer: MEDICAID

## 2021-05-10 ENCOUNTER — HOSPITAL ENCOUNTER (OUTPATIENT)
Age: 46
Discharge: HOME | End: 2021-05-10
Payer: MEDICAID

## 2021-05-10 VITALS
RESPIRATION RATE: 20 BRPM | SYSTOLIC BLOOD PRESSURE: 148 MMHG | TEMPERATURE: 98 F | OXYGEN SATURATION: 99 % | DIASTOLIC BLOOD PRESSURE: 88 MMHG | HEART RATE: 78 BPM

## 2021-05-10 DIAGNOSIS — J02.0 STREPTOCOCCAL SORE THROAT: ICD-10-CM

## 2021-05-10 DIAGNOSIS — T88.1XXA LOCAL REACTION TO COVID-19 VACCINE: Primary | ICD-10-CM

## 2021-05-10 DIAGNOSIS — S83.412A SPRAIN OF MEDIAL COLLATERAL LIGAMENT OF LEFT KNEE, INITIAL ENCOUNTER: ICD-10-CM

## 2021-05-10 DIAGNOSIS — R59.9 SWOLLEN GLAND: ICD-10-CM

## 2021-05-10 DIAGNOSIS — M43.6 NECK STIFFNESS: ICD-10-CM

## 2021-05-10 DIAGNOSIS — M17.0 BILATERAL PRIMARY OSTEOARTHRITIS OF KNEE: Primary | ICD-10-CM

## 2021-05-10 PROCEDURE — 99213 OFFICE O/P EST LOW 20 MIN: CPT

## 2021-05-10 PROCEDURE — 99213 OFFICE O/P EST LOW 20 MIN: CPT | Performed by: ORTHOPAEDIC SURGERY

## 2021-05-10 PROCEDURE — 87880 STREP A ASSAY W/OPTIC: CPT

## 2021-05-10 PROCEDURE — 0031A HC JANSSEN COVID-19 VACCINE ADMIN 1ST DOSE: CPT

## 2021-05-10 RX ORDER — NAPROXEN 500 MG/1
500 TABLET ORAL 2 TIMES DAILY PRN
Qty: 14 TABLET | Refills: 0 | Status: SHIPPED | OUTPATIENT
Start: 2021-05-10 | End: 2021-05-17

## 2021-05-10 RX ORDER — ALMOTRIPTAN MALATE 12.5 MG/1
TABLET, COATED ORAL
Qty: 8 TABLET | Refills: 0 | OUTPATIENT
Start: 2021-05-10

## 2021-05-10 RX ORDER — ALMOTRIPTAN MALATE 12.5 MG/1
TABLET, COATED ORAL
Qty: 8 TABLET | Refills: 0 | Status: SHIPPED | OUTPATIENT
Start: 2021-05-10 | End: 2021-07-14

## 2021-05-10 RX ORDER — NAPROXEN 500 MG/1
500 TABLET ORAL ONCE
Status: COMPLETED | OUTPATIENT
Start: 2021-05-10 | End: 2021-05-10

## 2021-05-10 RX ORDER — AMOXICILLIN 875 MG/1
875 TABLET, COATED ORAL 2 TIMES DAILY
Qty: 20 TABLET | Refills: 0 | Status: SHIPPED | OUTPATIENT
Start: 2021-05-10 | End: 2021-05-20

## 2021-05-10 NOTE — PROGRESS NOTES
NURSING INTAKE COMMENTS: Patient presents with:  Knee Pain: fu on left knee- intermediate of PT with some results- feels some pain in knee cap- feels like she is using her right knee- can't extend knee up up towards chest with out feeling tightness- some consta Ketoconazole 2 % External Shampoo Use to shampoo as directed 3 times weekly 120 mL 12   • Metoclopramide HCl 10 MG Oral Tab 1/2-1 tablet as needed for nausea, no more than 3 tablets in 24 hours 30 tablet 3   • Minoxidil 2 % External Solution Apply topicall distress  Vascular: 2+ and symmetric pulses  Skin: intact and benign  Neurologic: Bilateral sensation intact to light touch and motor strength intact grossly  Musculoskeletal: Left knee exam demonstrates no warmth, erythema or edema.   Knee range of motion

## 2021-05-10 NOTE — ED PROVIDER NOTES
Patient Seen in: [facility]      History   Patient presents with:  Neck Pain    Stated Complaint: skin irritation; swollen    HPI/Subjective:   Pepper Pollen is a 39year old  female here for swollen glands, neck pain, nausea, and are pain 2013        Years since quittin.3      Smokeless tobacco: Never Used    Vaping Use      Vaping Use: Never used    Alcohol use: Yes      Alcohol/week: 0.0 standard drinks      Comment: RARELY, 2 times per year    Drug use:  No             Review of S Supraclavicular adenopathy (mild) present. Skin:     Findings: No rash. Neurological:      Mental Status: She is alert and oriented to person, place, and time.    Psychiatric:         Mood and Affect: Mood normal.         Behavior: Behavior normal. questions answered. No acute distress and cleared for home.             Disposition and Plan     Clinical Impression:  Local reaction to COVID-19 vaccine  (primary encounter diagnosis)  Swollen gland  Neck stiffness  Streptococcal sore throat     Dispositio

## 2021-05-10 NOTE — ED INITIAL ASSESSMENT (HPI)
States she received her 2nd Covid vaccine on Friday. Woke up Saturday with nausea, arm pain, and generally not feeling well. Left sided neck pain with lymph node swelling and tenderness. No fever. Woke up during the night with sweats.

## 2021-05-10 NOTE — TELEPHONE ENCOUNTER
Refill request for almotriptan 12.5 mg, take 1 tab at onset of migraine, #8, no refills    LOV: 1/21/21  NOV: None  Last refilled on 4/13/21

## 2021-05-11 ENCOUNTER — TELEPHONE (OUTPATIENT)
Dept: PHYSICAL THERAPY | Facility: HOSPITAL | Age: 46
End: 2021-05-11

## 2021-05-11 ENCOUNTER — APPOINTMENT (OUTPATIENT)
Dept: PHYSICAL THERAPY | Age: 46
End: 2021-05-11
Attending: INTERNAL MEDICINE
Payer: MEDICAID

## 2021-05-13 ENCOUNTER — TELEPHONE (OUTPATIENT)
Dept: PHYSICAL THERAPY | Age: 46
End: 2021-05-13

## 2021-05-18 ENCOUNTER — OFFICE VISIT (OUTPATIENT)
Dept: PHYSICAL THERAPY | Age: 46
End: 2021-05-18
Attending: INTERNAL MEDICINE
Payer: MEDICAID

## 2021-05-18 PROCEDURE — 97110 THERAPEUTIC EXERCISES: CPT

## 2021-05-18 NOTE — PROGRESS NOTES
Dx: Bilateral primary osteoarthritis of knee , sprain of medial collateral ligament of left knee , initial encounter            Insurance 14  BCCom          POC# of visits: 10 visits          Authorized  # visits by insurance  :  14 visits   Expiration any doffing shoes . - improved   3. Pt will improve quad strength to 5/5 to ascend 1 flight of stairs reciprocally without UE assist.   4.Pt will increase hip and knee strength to grossly 4+/5 to be able to get up and down from the floor safely . -MET  5. Pt will

## 2021-05-20 ENCOUNTER — OFFICE VISIT (OUTPATIENT)
Dept: PHYSICAL THERAPY | Age: 46
End: 2021-05-20
Attending: INTERNAL MEDICINE
Payer: MEDICAID

## 2021-05-20 PROCEDURE — 97110 THERAPEUTIC EXERCISES: CPT | Performed by: PHYSICAL THERAPIST

## 2021-05-20 NOTE — PROGRESS NOTES
Dx: Bilateral primary osteoarthritis of knee , sprain of medial collateral ligament of left knee , initial encounter            Insurance 14  BCCom          POC# of visits: 10 visits          Authorized  # visits by insurance  :  14 visits   Expiration any flexion stretch at stairs 10 sec x 10 reps R/L   Heel raises x 20  Supine : R/L :  SLR x 20   leuko tape to B knees to correct lateral shift  Theraex :  NU step x 8 min , level 5  Seated :   R/L LAQ x 25 each   Supine : R/L   QS x 20  SLR x 20  Standing :

## 2021-05-25 ENCOUNTER — OFFICE VISIT (OUTPATIENT)
Dept: PHYSICAL THERAPY | Age: 46
End: 2021-05-25
Attending: INTERNAL MEDICINE
Payer: MEDICAID

## 2021-05-25 PROCEDURE — 97110 THERAPEUTIC EXERCISES: CPT

## 2021-05-25 NOTE — PROGRESS NOTES
Dx: Bilateral primary osteoarthritis of knee , sprain of medial collateral ligament of left knee , initial encounter            Insurance 14  BCCom          POC# of visits: 10 visits          Authorized  # visits by insurance  :  14 visits   Expiration any : R/L :  SLR x 20   leuko tape to B knees to correct lateral shift  Theraex :  NU step x 8 min , level 5  Seated :   R/L LAQ x 25 each   Supine : R/L   QS x 20  SLR x 20  Standing : R/L   Knee flexion stretch x 10 each with 10 sec hold   HS stretch with 10

## 2021-05-27 ENCOUNTER — OFFICE VISIT (OUTPATIENT)
Dept: PHYSICAL THERAPY | Age: 46
End: 2021-05-27
Attending: INTERNAL MEDICINE
Payer: MEDICAID

## 2021-05-27 PROCEDURE — 97110 THERAPEUTIC EXERCISES: CPT | Performed by: PHYSICAL THERAPIST

## 2021-05-27 NOTE — PROGRESS NOTES
ProgressSummary  Pt has attended 12 visits in Physical Therapy.    Reporting period: 4/1/21 to 5/27/2021      Dx: Bilateral primary osteoarthritis of knee , sprain of medial collateral ligament of left knee , initial encounter            Insurance 14  BCC strength to grossly 4+/5 to be able to get up and down from the floor safely . -MET  5. Pt will be independent and compliant with comprehensive HEP to maintain progress achieved in PT . -MET    FOTO: 40%    Plan: Pt is to continue with PT for 1 more visit and (verbal)  5/18/20201 : sideways steps then monster walk YTB x 20 steps each direction ( handout provided )    Charges: ex's 3       Total Timed Treatment: 40 min  Total Treatment Time: 40 min

## 2021-06-01 ENCOUNTER — OFFICE VISIT (OUTPATIENT)
Dept: PHYSICAL THERAPY | Age: 46
End: 2021-06-01
Attending: INTERNAL MEDICINE
Payer: MEDICAID

## 2021-06-01 PROCEDURE — 97110 THERAPEUTIC EXERCISES: CPT

## 2021-06-01 NOTE — PROGRESS NOTES
Dx: Bilateral primary osteoarthritis of knee , sprain of medial collateral ligament of left knee , initial encounter            Insurance 14  BCCom          POC# of visits: 10 visits          Authorized  # visits by insurance  :  14 visits   Expiration any tape to B knees to correct lateral shift  Theraex :  NU step x 8 min , level 5  Seated :   R/L LAQ x 25 each   Supine : R/L   QS x 20  SLR x 20  Standing : R/L   Knee flexion stretch x 10 each with 10 sec hold   HS stretch with 10 sec hold each   Sideways

## 2021-06-07 ENCOUNTER — HOSPITAL ENCOUNTER (OUTPATIENT)
Dept: MRI IMAGING | Age: 46
Discharge: HOME | End: 2021-06-07
Attending: ORTHOPAEDIC SURGERY
Payer: MEDICAID

## 2021-06-07 DIAGNOSIS — S83.412A SPRAIN OF MEDIAL COLLATERAL LIGAMENT OF LEFT KNEE, INITIAL ENCOUNTER: ICD-10-CM

## 2021-06-07 DIAGNOSIS — M17.0 BILATERAL PRIMARY OSTEOARTHRITIS OF KNEE: ICD-10-CM

## 2021-06-07 PROCEDURE — 73721 MRI JNT OF LWR EXTRE W/O DYE: CPT | Performed by: ORTHOPAEDIC SURGERY

## 2021-06-08 PROBLEM — E88.819 INSULIN RESISTANCE: Status: ACTIVE | Noted: 2021-06-08

## 2021-06-08 PROBLEM — E88.81 INSULIN RESISTANCE: Status: ACTIVE | Noted: 2021-06-08

## 2021-06-08 PROBLEM — E66.01 MORBID OBESITY WITH BMI OF 60.0-69.9, ADULT (HCC): Status: ACTIVE | Noted: 2017-06-15

## 2021-06-08 PROBLEM — F43.9 STRESS: Status: ACTIVE | Noted: 2021-06-08

## 2021-06-08 PROBLEM — R63.5 WEIGHT GAIN: Status: ACTIVE | Noted: 2021-06-08

## 2021-06-08 NOTE — PROGRESS NOTES
[address]  Dept: [phone]       Patient:  Glenn Mckinney  :      1975  MRN:      [id number]    Chief Complaint:  Patient presents w Take by mouth. • Mometasone Furoate 0.1 % External Cream Apply 1 Application topically daily. Apply a thin film to the affected area(s).  45 g 1   • Ketoconazole 2 % External Shampoo Use to shampoo as directed 3 times weekly 120 mL 12   • Metoclopramide Back Care: Not Asked        Exercise: No        Bike Helmet: Not Asked        Seat Belt: Not Asked        Self-Exams: Not Asked        [place] up [place] a farm: Not Asked        History of tanning: Not Asked        Outdoor occupation: Not Asked        Pt has a pa Journal  · Reviewed and Discussed:       · Patient has a Food Journal?: yes   · Patient is reading nutrition labels? yes  · Average Caloric Intake:     · Average CHO Intake: 100  · Is patient exercising? yes  · Type of exercise?  Limited due to leg injury rhythm  Abdomen: soft, obese, large pannus  Extremities: extremities normal, atraumatic, no cyanosis or edema  Pulses: 2+ and symmetric  Skin: Skin color, texture, turgor normal. No rashes or lesions    ASSESSMENT     OBSTRUCTIVE SLEEP APNEA: The patient s

## 2021-06-18 ENCOUNTER — HOSPITAL ENCOUNTER (EMERGENCY)
Facility: HOSPITAL | Age: 46
Discharge: HOME | End: 2021-06-19
Attending: EMERGENCY MEDICINE
Payer: MEDICAID

## 2021-06-18 ENCOUNTER — OFFICE VISIT (OUTPATIENT)
Dept: ORTHOPEDICS CLINIC | Facility: CLINIC | Age: 46
End: 2021-06-18
Payer: MEDICAID

## 2021-06-18 ENCOUNTER — APPOINTMENT (OUTPATIENT)
Dept: CT IMAGING | Facility: HOSPITAL | Age: 46
End: 2021-06-18
Attending: EMERGENCY MEDICINE
Payer: MEDICAID

## 2021-06-18 ENCOUNTER — APPOINTMENT (OUTPATIENT)
Dept: GENERAL RADIOLOGY | Facility: HOSPITAL | Age: 46
End: 2021-06-18
Attending: EMERGENCY MEDICINE
Payer: MEDICAID

## 2021-06-18 DIAGNOSIS — R07.89 CHEST PAIN, ATYPICAL: Primary | ICD-10-CM

## 2021-06-18 DIAGNOSIS — S83.242D ACUTE MEDIAL MENISCUS TEAR OF LEFT KNEE, SUBSEQUENT ENCOUNTER: ICD-10-CM

## 2021-06-18 DIAGNOSIS — R06.00 DYSPNEA, UNSPECIFIED TYPE: ICD-10-CM

## 2021-06-18 DIAGNOSIS — M17.0 BILATERAL PRIMARY OSTEOARTHRITIS OF KNEE: Primary | ICD-10-CM

## 2021-06-18 DIAGNOSIS — S83.282D ACUTE LATERAL MENISCUS TEAR OF LEFT KNEE, SUBSEQUENT ENCOUNTER: ICD-10-CM

## 2021-06-18 DIAGNOSIS — S83.512D RUPTURE OF ANTERIOR CRUCIATE LIGAMENT OF LEFT KNEE, SUBSEQUENT ENCOUNTER: ICD-10-CM

## 2021-06-18 PROCEDURE — 83880 ASSAY OF NATRIURETIC PEPTIDE: CPT | Performed by: EMERGENCY MEDICINE

## 2021-06-18 PROCEDURE — 96376 TX/PRO/DX INJ SAME DRUG ADON: CPT

## 2021-06-18 PROCEDURE — 96374 THER/PROPH/DIAG INJ IV PUSH: CPT

## 2021-06-18 PROCEDURE — 84484 ASSAY OF TROPONIN QUANT: CPT | Performed by: EMERGENCY MEDICINE

## 2021-06-18 PROCEDURE — 80048 BASIC METABOLIC PNL TOTAL CA: CPT | Performed by: EMERGENCY MEDICINE

## 2021-06-18 PROCEDURE — 93010 ELECTROCARDIOGRAM REPORT: CPT | Performed by: EMERGENCY MEDICINE

## 2021-06-18 PROCEDURE — 85025 COMPLETE CBC W/AUTO DIFF WBC: CPT | Performed by: EMERGENCY MEDICINE

## 2021-06-18 PROCEDURE — 85379 FIBRIN DEGRADATION QUANT: CPT | Performed by: EMERGENCY MEDICINE

## 2021-06-18 PROCEDURE — 93005 ELECTROCARDIOGRAM TRACING: CPT

## 2021-06-18 PROCEDURE — 20610 DRAIN/INJ JOINT/BURSA W/O US: CPT | Performed by: ORTHOPAEDIC SURGERY

## 2021-06-18 PROCEDURE — 99285 EMERGENCY DEPT VISIT HI MDM: CPT

## 2021-06-18 PROCEDURE — 71260 CT THORAX DX C+: CPT | Performed by: EMERGENCY MEDICINE

## 2021-06-18 PROCEDURE — 71045 X-RAY EXAM CHEST 1 VIEW: CPT | Performed by: EMERGENCY MEDICINE

## 2021-06-18 PROCEDURE — 96375 TX/PRO/DX INJ NEW DRUG ADDON: CPT

## 2021-06-18 RX ORDER — LORAZEPAM 2 MG/ML
0.5 INJECTION INTRAMUSCULAR ONCE
Status: COMPLETED | OUTPATIENT
Start: 2021-06-18 | End: 2021-06-18

## 2021-06-18 RX ORDER — TRIAMCINOLONE ACETONIDE 40 MG/ML
40 INJECTION, SUSPENSION INTRA-ARTICULAR; INTRAMUSCULAR ONCE
Status: COMPLETED | OUTPATIENT
Start: 2021-06-18 | End: 2021-06-18

## 2021-06-18 RX ORDER — LORAZEPAM 2 MG/ML
0.5 INJECTION INTRAMUSCULAR ONCE
Status: COMPLETED | OUTPATIENT
Start: 2021-06-18 | End: 2021-06-19

## 2021-06-18 RX ADMIN — TRIAMCINOLONE ACETONIDE 40 MG: 40 INJECTION, SUSPENSION INTRA-ARTICULAR; INTRAMUSCULAR at 15:03:00

## 2021-06-18 RX ADMIN — TRIAMCINOLONE ACETONIDE 40 MG: 40 INJECTION, SUSPENSION INTRA-ARTICULAR; INTRAMUSCULAR at 15:05:00

## 2021-06-18 NOTE — PROGRESS NOTES
NURSING INTAKE COMMENTS: Patient presents with: Follow - Up: f/u on MRI results for left knee      HPI: This 39year old female presents today with complaints of bilateral knee pain with a history of left knee injury slipping on the ice 4 months ago.   Gareth Cobos daily. Apply a thin film to the affected area(s).  45 g 1   • Ketoconazole 2 % External Shampoo Use to shampoo as directed 3 times weekly 120 mL 12   • Metoclopramide HCl 10 MG Oral Tab 1/2-1 tablet as needed for nausea, no more than 3 tablets in 24 hours 3 sensation intact to light touch and motor strength intact grossly  Musculoskeletal: Bilateral knee exam demonstrates range of motion 0 to 120 degrees with significant retropatellar crepitus that is painful.   There is medial joint line tenderness bilaterall chondral loss seen throughout the lateral patellar facet and patellar apex as well as involving the lateral trochlea with subchondral cystic change.   BONE MARROW: Heterogeneous signal is seen within the distal femur and proximal tibia suggestive of red mar left knee, subsequent encounter    Rupture of anterior cruciate ligament of left knee, subsequent encounter        Plan: I had a long discussion about bilateral knee symptoms and her MRI findings of her left knee.   She did have traumatic left knee ACL tear

## 2021-06-18 NOTE — PROGRESS NOTES
Per verbal order from Dr. Brigido Merrill, draw up 4ml of 1% lidocaine and 1ml of Kenalog 40 for cortisone injection bilateral knees

## 2021-06-19 VITALS
OXYGEN SATURATION: 96 % | TEMPERATURE: 98 F | HEIGHT: 60 IN | DIASTOLIC BLOOD PRESSURE: 80 MMHG | HEART RATE: 124 BPM | SYSTOLIC BLOOD PRESSURE: 145 MMHG | BODY MASS INDEX: 57.52 KG/M2 | WEIGHT: 293 LBS | RESPIRATION RATE: 20 BRPM

## 2021-06-19 PROCEDURE — 94640 AIRWAY INHALATION TREATMENT: CPT

## 2021-06-19 PROCEDURE — 84484 ASSAY OF TROPONIN QUANT: CPT | Performed by: EMERGENCY MEDICINE

## 2021-06-19 RX ORDER — IPRATROPIUM BROMIDE AND ALBUTEROL SULFATE 2.5; .5 MG/3ML; MG/3ML
3 SOLUTION RESPIRATORY (INHALATION) ONCE
Status: COMPLETED | OUTPATIENT
Start: 2021-06-19 | End: 2021-06-19

## 2021-06-19 RX ORDER — PREDNISONE 20 MG/1
40 TABLET ORAL DAILY
Qty: 8 TABLET | Refills: 0 | Status: SHIPPED | OUTPATIENT
Start: 2021-06-19 | End: 2021-06-23

## 2021-06-19 RX ORDER — METHYLPREDNISOLONE SODIUM SUCCINATE 125 MG/2ML
125 INJECTION, POWDER, LYOPHILIZED, FOR SOLUTION INTRAMUSCULAR; INTRAVENOUS ONCE
Status: COMPLETED | OUTPATIENT
Start: 2021-06-19 | End: 2021-06-19

## 2021-06-19 RX ORDER — ALBUTEROL SULFATE 90 UG/1
2 AEROSOL, METERED RESPIRATORY (INHALATION) EVERY 4 HOURS PRN
Qty: 1 EACH | Refills: 0 | Status: SHIPPED | OUTPATIENT
Start: 2021-06-19 | End: 2021-07-19

## 2021-06-19 NOTE — ED QUICK NOTES
Pt ambulated around ED pod w/o complications, slightly tachypneic, slow but steady gait d/t ongoing knee issues, and 92-93% on RA immediately afterwards. MD Karin Nixon aware and at bedside.

## 2021-06-19 NOTE — ED PROVIDER NOTES
Patient Seen in: [facility] AND [facility] Emergency Department    History   Patient presents with:  Difficulty Breathing    Stated Complaint: short of breath, tachycardia    HPI    Patient presents with acute onset of difficulty breathing racing heart and some Take 1 tablet (75 mg total) by mouth daily. Almotriptan Malate 12.5 MG Oral Tab,  use at onset; may repeat once after 4 hours- ONLY 2 IN 24 HOUR PERIOD MAX. This is a 30 day supply.    Omeprazole 40 MG Oral Capsule Delayed Release,  Take 1 capsule (40 mg 96%   BMI 61.52 kg/m²         Physical Exam  Constitutional:  Alert, well nourished adult lying in bed in no distress. Vital signs noted. Eye:  No scleral icterus. Eyelids appear normal, no lesions.   Cardiovascular:  Normal S1 and S2, no murmur, regular rate is returned to normal she plans on calling her doctor Monday    ED Course     Labs Reviewed   BASIC METABOLIC PANEL (8) - Abnormal; Notable for the following components:       Result Value    Glucose 204 (*)     BUN 20 (*)     BUN/CREA Ratio 23.5 (*) diagnosis)  Dyspnea, unspecified type    Disposition:  There is no disposition on file for this visit.     Follow-up:  Rafa Tracy 70  [phone]    In 5 days  For re-check      Medications Prescribed:  Robert Stewart

## 2021-06-19 NOTE — ED QUICK NOTES
MD Mayorga cleared pt for discharge following pt's results. Pt a/ox4, respirations unlabored, speech full/clear, gait steady, no acute distress. All ED orders completed.     Pt instructed to return to ED for any new/severe s/s or as directed by provider, a

## 2021-06-19 NOTE — ED INITIAL ASSESSMENT (HPI)
C/o sob and heart racing while reading a bedtime story tonight. Speaking in full sentences in triage.

## 2021-06-27 PROBLEM — H52.4 PRESBYOPIA OF BOTH EYES: Status: RESOLVED | Noted: 2018-05-16 | Resolved: 2021-06-27

## 2021-06-27 PROBLEM — Z51.81 ENCOUNTER FOR THERAPEUTIC DRUG MONITORING: Status: RESOLVED | Noted: 2017-07-20 | Resolved: 2021-06-27

## 2021-06-27 PROBLEM — M17.9 DJD (DEGENERATIVE JOINT DISEASE) OF KNEE: Status: RESOLVED | Noted: 2017-06-15 | Resolved: 2021-06-27

## 2021-06-27 PROBLEM — R63.5 WEIGHT GAIN: Status: RESOLVED | Noted: 2021-06-08 | Resolved: 2021-06-27

## 2021-06-27 PROBLEM — M17.10 DJD (DEGENERATIVE JOINT DISEASE) OF KNEE: Status: RESOLVED | Noted: 2017-06-15 | Resolved: 2021-06-27

## 2021-06-28 NOTE — PROGRESS NOTES
HPI:    Patient ID: Isabel Banuelos is a 39year old female.     HPI     Follow up  Er visit  Non cardiac chest pain  Tachycardia  Feeling better   Still having a lot of stress  Followed by bariatric IM  Per pt planning on having bariatric surgery    State 06/25/2020         Review of Systems   Constitutional: Negative. Respiratory: Negative. Cardiovascular: Negative. Gastrointestinal: Negative. Genitourinary: Negative. Psychiatric/Behavioral: The patient is nervous/anxious.           Current O daily. Allergies:  Sulfa Antibiotics       RASH    HISTORY:  Past Medical History:   Diagnosis Date   • Chicken pox    • Depression    • Extrinsic asthma, unspecified    • H/O seasonal allergies    • Migraines    • Morbid obesity with BMI of 50.0-59. regular rhythm. Heart sounds: Normal heart sounds. No murmur heard. No gallop. Pulmonary:      Effort: Pulmonary effort is normal. No respiratory distress. Breath sounds: Normal breath sounds. No wheezing or rales.    Abdominal:      General:

## 2021-07-07 ENCOUNTER — OFFICE VISIT (OUTPATIENT)
Dept: NEUROLOGY | Facility: CLINIC | Age: 46
End: 2021-07-07
Payer: MEDICAID

## 2021-07-07 VITALS
SYSTOLIC BLOOD PRESSURE: 138 MMHG | HEIGHT: 60 IN | HEART RATE: 88 BPM | BODY MASS INDEX: 57.52 KG/M2 | DIASTOLIC BLOOD PRESSURE: 74 MMHG | WEIGHT: 293 LBS

## 2021-07-07 DIAGNOSIS — H53.8 BLURRY VISION, BILATERAL: ICD-10-CM

## 2021-07-07 DIAGNOSIS — F41.9 ANXIETY AND DEPRESSION: ICD-10-CM

## 2021-07-07 DIAGNOSIS — F32.A ANXIETY AND DEPRESSION: ICD-10-CM

## 2021-07-07 DIAGNOSIS — G43.011 INTRACTABLE MIGRAINE WITHOUT AURA AND WITH STATUS MIGRAINOSUS: Primary | ICD-10-CM

## 2021-07-07 PROCEDURE — 3008F BODY MASS INDEX DOCD: CPT | Performed by: OTHER

## 2021-07-07 PROCEDURE — 3078F DIAST BP <80 MM HG: CPT | Performed by: OTHER

## 2021-07-07 PROCEDURE — 3075F SYST BP GE 130 - 139MM HG: CPT | Performed by: OTHER

## 2021-07-07 PROCEDURE — 99214 OFFICE O/P EST MOD 30 MIN: CPT | Performed by: OTHER

## 2021-07-07 RX ORDER — OMEPRAZOLE 40 MG/1
40 CAPSULE, DELAYED RELEASE ORAL DAILY
Qty: 90 CAPSULE | Refills: 0 | Status: SHIPPED | OUTPATIENT
Start: 2021-07-07 | End: 2021-08-09

## 2021-07-07 RX ORDER — OMEPRAZOLE 40 MG/1
CAPSULE, DELAYED RELEASE ORAL
Qty: 90 CAPSULE | Refills: 0 | Status: SHIPPED | OUTPATIENT
Start: 2021-07-07 | End: 2021-08-27

## 2021-07-07 RX ORDER — GABAPENTIN 100 MG/1
CAPSULE ORAL
Qty: 102 CAPSULE | Refills: 0 | Status: SHIPPED | OUTPATIENT
Start: 2021-07-07 | End: 2021-08-14

## 2021-07-07 NOTE — PATIENT INSTRUCTIONS
-Stop Topiramate   -Start Gabapentin   -Updated eye exam   -Follow up in 3 months or sooner if needed.     -If you develop sudden onset loss of vision, double vision, room spinning/world spinning sensation, inability to speak or understand others who are sp

## 2021-07-07 NOTE — PROGRESS NOTES
Sima Dub 37  [facility], [address] [place] [address]  [phone]          Established  Follow Up Visit       Date: July 7, 2021  Patient Name: South Clark   MRN: [id number]  Primary physician: Doctor Pak [address] [place] [address] Cholecalciferol (VITAMIN D) 50 MCG (2000 UT) Oral Cap, Take by mouth., Disp: , Rfl:   Mometasone Furoate 0.1 % External Cream, Apply 1 Application topically daily. Apply a thin film to the affected area(s). , Disp: 45 g, Rfl: 1  Ketoconazole 2 % External Sh (L)   Monocytes Absolute      0.10 - 1.00 x10(3) uL 0.04 (L)   Eosinophils Absolute      0.00 - 0.70 x10(3) uL 0.00   Basophils Absolute      0.00 - 0.20 x10(3) uL 0.01   Immature Granulocyte Absolute      0.00 - 1.00 x10(3) uL 0.05   Neutrophils %      % migraines have not changed from prior   -Follow up: 3 month   -Patient should let my office know if she becomes pregnant or plan to become pregnant so we can adjust/stop medication safely     Anxiety and Depression  --Duane Cockayne navigator ordered, patient

## 2021-07-09 NOTE — TELEPHONE ENCOUNTER
Pt states she has been prescribed antibiotics for sinus and ear infections; cough, sore throat, nasal congestion. Pt finished course of Amoxicillin-Pot Clavulanate on 10/19/19.  States symptoms improved while taking med but symptoms returned again about two
No

## 2021-07-14 DIAGNOSIS — G43.011 INTRACTABLE MIGRAINE WITHOUT AURA AND WITH STATUS MIGRAINOSUS: Primary | ICD-10-CM

## 2021-07-14 RX ORDER — ALMOTRIPTAN MALATE 12.5 MG/1
TABLET, COATED ORAL
Qty: 8 TABLET | Refills: 2 | Status: SHIPPED | OUTPATIENT
Start: 2021-07-14

## 2021-07-14 NOTE — TELEPHONE ENCOUNTER
Medication:  Almotriptan Malate 12.5 MG Oral Tab       Date of last refill: 5/10/21 (#8/0)  [place]     Last office visit: 7/7/21  Due back to clinic per last office note:  3 months   Date next office visit scheduled

## 2021-07-15 ENCOUNTER — HOSPITAL ENCOUNTER (EMERGENCY)
Facility: HOSPITAL | Age: 46
Discharge: HOME | End: 2021-07-16
Attending: EMERGENCY MEDICINE
Payer: MEDICAID

## 2021-07-15 DIAGNOSIS — R51.9 NONINTRACTABLE HEADACHE, UNSPECIFIED CHRONICITY PATTERN, UNSPECIFIED HEADACHE TYPE: Primary | ICD-10-CM

## 2021-07-15 LAB
BASOPHILS # BLD AUTO: 0.03 X10(3) UL (ref 0–0.2)
BASOPHILS NFR BLD AUTO: 0.6 %
DEPRECATED RDW RBC AUTO: 41.3 FL (ref 35.1–46.3)
EOSINOPHIL # BLD AUTO: 0.17 X10(3) UL (ref 0–0.7)
EOSINOPHIL NFR BLD AUTO: 3.1 %
ERYTHROCYTE [DISTWIDTH] IN BLOOD BY AUTOMATED COUNT: 12.8 % (ref 11–15)
HCT VFR BLD AUTO: 44.7 %
HGB BLD-MCNC: 14.3 G/DL
IMM GRANULOCYTES # BLD AUTO: 0.01 X10(3) UL (ref 0–1)
IMM GRANULOCYTES NFR BLD: 0.2 %
LYMPHOCYTES # BLD AUTO: 1.42 X10(3) UL (ref 1–4)
LYMPHOCYTES NFR BLD AUTO: 26.1 %
MCH RBC QN AUTO: 28.1 PG (ref 26–34)
MCHC RBC AUTO-ENTMCNC: 32 G/DL (ref 31–37)
MCV RBC AUTO: 87.8 FL
MONOCYTES # BLD AUTO: 0.46 X10(3) UL (ref 0.1–1)
MONOCYTES NFR BLD AUTO: 8.4 %
NEUTROPHILS # BLD AUTO: 3.36 X10 (3) UL (ref 1.5–7.7)
NEUTROPHILS # BLD AUTO: 3.36 X10(3) UL (ref 1.5–7.7)
NEUTROPHILS NFR BLD AUTO: 61.6 %
PLATELET # BLD AUTO: 281 10(3)UL (ref 150–450)
RBC # BLD AUTO: 5.09 X10(6)UL
WBC # BLD AUTO: 5.5 X10(3) UL (ref 4–11)

## 2021-07-15 PROCEDURE — 93005 ELECTROCARDIOGRAM TRACING: CPT

## 2021-07-15 PROCEDURE — 85025 COMPLETE CBC W/AUTO DIFF WBC: CPT | Performed by: EMERGENCY MEDICINE

## 2021-07-15 PROCEDURE — 93010 ELECTROCARDIOGRAM REPORT: CPT | Performed by: EMERGENCY MEDICINE

## 2021-07-15 PROCEDURE — 96375 TX/PRO/DX INJ NEW DRUG ADDON: CPT

## 2021-07-15 PROCEDURE — 96374 THER/PROPH/DIAG INJ IV PUSH: CPT

## 2021-07-15 PROCEDURE — 99284 EMERGENCY DEPT VISIT MOD MDM: CPT

## 2021-07-15 PROCEDURE — 82550 ASSAY OF CK (CPK): CPT | Performed by: EMERGENCY MEDICINE

## 2021-07-15 PROCEDURE — 80053 COMPREHEN METABOLIC PANEL: CPT | Performed by: EMERGENCY MEDICINE

## 2021-07-15 RX ORDER — DIPHENHYDRAMINE HYDROCHLORIDE 50 MG/ML
25 INJECTION INTRAMUSCULAR; INTRAVENOUS ONCE
Status: COMPLETED | OUTPATIENT
Start: 2021-07-15 | End: 2021-07-15

## 2021-07-15 RX ORDER — METOCLOPRAMIDE HYDROCHLORIDE 5 MG/ML
10 INJECTION INTRAMUSCULAR; INTRAVENOUS ONCE
Status: COMPLETED | OUTPATIENT
Start: 2021-07-15 | End: 2021-07-15

## 2021-07-16 ENCOUNTER — APPOINTMENT (OUTPATIENT)
Dept: MRI IMAGING | Facility: HOSPITAL | Age: 46
End: 2021-07-16
Attending: EMERGENCY MEDICINE
Payer: MEDICAID

## 2021-07-16 VITALS
WEIGHT: 293 LBS | BODY MASS INDEX: 57.52 KG/M2 | RESPIRATION RATE: 16 BRPM | SYSTOLIC BLOOD PRESSURE: 126 MMHG | TEMPERATURE: 98 F | HEART RATE: 80 BPM | HEIGHT: 60 IN | OXYGEN SATURATION: 98 % | DIASTOLIC BLOOD PRESSURE: 69 MMHG

## 2021-07-16 LAB
ALBUMIN SERPL-MCNC: 3.6 G/DL (ref 3.4–5)
ALBUMIN/GLOB SERPL: 0.8 {RATIO} (ref 1–2)
ALP LIVER SERPL-CCNC: 100 U/L
ALT SERPL-CCNC: 27 U/L
ANION GAP SERPL CALC-SCNC: 5 MMOL/L (ref 0–18)
AST SERPL-CCNC: 18 U/L (ref 15–37)
BILIRUB SERPL-MCNC: 0.4 MG/DL (ref 0.1–2)
BUN BLD-MCNC: 23 MG/DL (ref 7–18)
BUN/CREAT SERPL: 29.1 (ref 10–20)
CALCIUM BLD-MCNC: 9.1 MG/DL (ref 8.5–10.1)
CHLORIDE SERPL-SCNC: 104 MMOL/L (ref 98–112)
CK SERPL-CCNC: 55 U/L
CO2 SERPL-SCNC: 30 MMOL/L (ref 21–32)
CREAT BLD-MCNC: 0.79 MG/DL
GLOBULIN PLAS-MCNC: 4.4 G/DL (ref 2.8–4.4)
GLUCOSE BLD-MCNC: 127 MG/DL (ref 70–99)
M PROTEIN MFR SERPL ELPH: 8 G/DL (ref 6.4–8.2)
OSMOLALITY SERPL CALC.SUM OF ELEC: 293 MOSM/KG (ref 275–295)
POTASSIUM SERPL-SCNC: 3.8 MMOL/L (ref 3.5–5.1)
SODIUM SERPL-SCNC: 139 MMOL/L (ref 136–145)

## 2021-07-16 PROCEDURE — 70551 MRI BRAIN STEM W/O DYE: CPT | Performed by: EMERGENCY MEDICINE

## 2021-07-16 RX ORDER — METOCLOPRAMIDE 10 MG/1
10 TABLET ORAL EVERY 6 HOURS PRN
Qty: 20 TABLET | Refills: 0 | Status: SHIPPED | OUTPATIENT
Start: 2021-07-16 | End: 2021-07-21

## 2021-07-16 RX ORDER — LORAZEPAM 2 MG/ML
1 INJECTION INTRAMUSCULAR ONCE
Status: COMPLETED | OUTPATIENT
Start: 2021-07-16 | End: 2021-07-16

## 2021-07-16 RX ORDER — KETOROLAC TROMETHAMINE 15 MG/ML
15 INJECTION, SOLUTION INTRAMUSCULAR; INTRAVENOUS ONCE
Status: COMPLETED | OUTPATIENT
Start: 2021-07-16 | End: 2021-07-16

## 2021-07-16 RX ORDER — KETOROLAC TROMETHAMINE 10 MG/1
10 TABLET, FILM COATED ORAL EVERY 6 HOURS PRN
Qty: 28 TABLET | Refills: 0 | Status: SHIPPED | OUTPATIENT
Start: 2021-07-16 | End: 2021-07-23

## 2021-07-16 NOTE — PROGRESS NOTES
[address], [address]  Dept: [phone]       Patient:  South Clark  :      1975  MRN:      [id number]    Chief Complaint:  Patient presents w OMEPRAZOLE 40 MG Oral Capsule Delayed Release TAKE ONE CAPSULE BY MOUTH ONE TIME DAILY  90 capsule 0   • gabapentin 100 MG Oral Cap Take 1 capsule (100 mg total) by mouth nightly for 4 days, THEN 1 capsule (100 mg total) 2 (two) times daily for 4 days, THE Vaping Use: Never used    Substance and Sexual Activity      Alcohol use:  Yes        Alcohol/week: 0.0 standard drinks        Comment: RARELY, 2 times per year      Drug use: No      Sexual activity: Not Currently        Partners: Male    Other Topics Current or Ex-Partner:       Emotionally Abused:       Physically Abused:       Sexually Abused:   Surgical History:    Past Surgical History:   Procedure Laterality Date   •      • CYST ASPIRATION RIGHT     • MYOMECTOMY 5/> INTRAMURAL MYOMAS negative  Cardiovascular: negative  Gastrointestinal: negative  Hematologic/lymphatic: negative  Neurological: negative  Behavioral/Psych: positive for stress  Endocrine: negative  All other systems were reviewed and are negative    Physical Exam:   Kourtney Barrios needs to go to a KIP.     Yessica Aly, APRN

## 2021-07-16 NOTE — ED INITIAL ASSESSMENT (HPI)
Jovani presents to ED with c/o of headache, nausea, dizziness x 2 days. Patient also also states neck stiffness also. Denies fevers.

## 2021-07-16 NOTE — ED PROVIDER NOTES
Patient Seen in: [facility] AND [facility] Emergency Department    History   Patient presents with:  Headache    Stated Complaint: Severe Headache x2 days     HPI    55-year-old female with past medical history of migraines presenting for evaluation of several day Capsule SR 24 Hr,  Take 1 capsule (75 mg total) by mouth daily. Cholecalciferol (VITAMIN D) 50 MCG (2000 UT) Oral Cap,  Take by mouth. Mometasone Furoate 0.1 % External Cream,  Apply 1 Application topically daily.  Apply a thin film to the affected area 97.6 °F (36.4 °C) (Oral)   Resp 16   Ht 152.4 cm (5')   Wt (!) 140.6 kg   LMP 06/24/2021 (Exact Date)   SpO2 99%   BMI 60.54 kg/m²         Physical Exam   Constitutional: No distress. Nontoxic, obese, well appearing. HEENT: MMM. Head: Normocephalic.  Mayi Din [id number]  Physician:  Denis Laughlin  YOB: 1975    Past Medical History (entered by Technologist):    Reason For Exam (entered by Technologist):  headache for 2 days no trauma  Other Notes (entered by Technologist):      Additional In distribution, dissemination or action taken in relation to the contents of this report is strictly prohibited and may be unlawful.  If you have received this report in error, please notify the sender immediately at [phone] and permanently delete the or Tromethamine 10 MG Oral Tab  Take 1 tablet (10 mg total) by mouth every 6 (six) hours as needed for Pain., Print, [id number] tablet, R-0

## 2021-07-16 NOTE — PROGRESS NOTES
[address]  Dept: [phone]       Patient:  Isabel Banuelos  :      1975  MRN:      [id number]    Chief Complaint:  Patient pres daily.  90 capsule 0   • OMEPRAZOLE 40 MG Oral Capsule Delayed Release TAKE ONE CAPSULE BY MOUTH ONE TIME DAILY  90 capsule 0   • gabapentin 100 MG Oral Cap Take 1 capsule (100 mg total) by mouth nightly for 4 days, THEN 1 capsule (100 mg total) 2 (two) paras User    Vaping Use      Vaping Use: Never used    Substance and Sexual Activity      Alcohol use:  Yes        Alcohol/week: 0.0 standard drinks        Comment: RARELY, 2 times per year      Drug use: No      Sexual activity: Not Currently        Partners: M Violence:       Fear of Current or Ex-Partner:       Emotionally Abused:       Physically Abused:       Sexually Abused:   Surgical History:    Past Surgical History:   Procedure Laterality Date   •   2013   • CYST ASPIRATION RIGHT     • MYOMECTOM resistance    Morbid obesity with BMI of 60.0-69.9, adult ([facility] [address]    Weight gain    Lower extremity edema          Di Short, APRN

## 2021-08-04 NOTE — TELEPHONE ENCOUNTER
Message to Joy Brunner [phone] if [facility] for refills of Venlafaxine?      Last annual was 2/5/21

## 2021-08-05 RX ORDER — VENLAFAXINE HYDROCHLORIDE 75 MG/1
75 CAPSULE, EXTENDED RELEASE ORAL DAILY
Qty: 90 CAPSULE | Refills: 1 | Status: SHIPPED | OUTPATIENT
Start: 2021-08-05

## 2021-08-09 RX ORDER — OMEPRAZOLE 40 MG/1
40 CAPSULE, DELAYED RELEASE ORAL DAILY
Qty: 90 CAPSULE | Refills: 0 | Status: SHIPPED | OUTPATIENT
Start: 2021-08-09 | End: 2021-11-03

## 2021-08-18 ENCOUNTER — OFFICE VISIT (OUTPATIENT)
Dept: SURGERY | Facility: CLINIC | Age: 46
End: 2021-08-18
Payer: MEDICAID

## 2021-08-18 VITALS
HEART RATE: 101 BPM | OXYGEN SATURATION: 97 % | SYSTOLIC BLOOD PRESSURE: 136 MMHG | WEIGHT: 293 LBS | HEIGHT: 58.5 IN | BODY MASS INDEX: 59.86 KG/M2 | DIASTOLIC BLOOD PRESSURE: 88 MMHG

## 2021-08-18 DIAGNOSIS — E88.81 INSULIN RESISTANCE: Primary | ICD-10-CM

## 2021-08-18 DIAGNOSIS — R60.0 LOWER EXTREMITY EDEMA: ICD-10-CM

## 2021-08-18 DIAGNOSIS — F43.9 STRESS: ICD-10-CM

## 2021-08-18 DIAGNOSIS — R63.5 WEIGHT GAIN: ICD-10-CM

## 2021-08-18 DIAGNOSIS — E66.01 MORBID OBESITY WITH BMI OF 60.0-69.9, ADULT (HCC): ICD-10-CM

## 2021-08-18 PROCEDURE — 99214 OFFICE O/P EST MOD 30 MIN: CPT | Performed by: INTERNAL MEDICINE

## 2021-08-18 PROCEDURE — 3075F SYST BP GE 130 - 139MM HG: CPT | Performed by: INTERNAL MEDICINE

## 2021-08-18 PROCEDURE — 3079F DIAST BP 80-89 MM HG: CPT | Performed by: INTERNAL MEDICINE

## 2021-08-18 PROCEDURE — 3008F BODY MASS INDEX DOCD: CPT | Performed by: INTERNAL MEDICINE

## 2021-08-18 NOTE — PROGRESS NOTES
[address]  Dept: [phone]       Patient:  Pb Sanchez  :      1975  MRN:      [id number]    Chief Complaint:  Patient presents w • Cholecalciferol (VITAMIN D) 50 MCG (2000 UT) Oral Cap Take by mouth. • Mometasone Furoate 0.1 % External Cream Apply 1 Application topically daily. Apply a thin film to the affected area(s).  45 g 1   • Ketoconazole 2 % External Shampoo Use to shamp Not Asked        Seat Belt: Not Asked        Self-Exams: Not Asked        Grew up on a farm: Not Asked        History of tanning: Not Asked        Outdoor occupation: Not Asked        Pt has a pacemaker: No        Pt has a defibrillator: No        Breast f Food Journal?: yes   · Patient is reading nutrition labels? yes  · Average Caloric Intake:     · Average CHO Intake: 100  · Is patient exercising? yes  · Type of exercise?  Limited due to leg injury     Eating Habits  · Patient states the following:  · Eat turgor normal. No rashes or lesions    ASSESSMENT     OBSTRUCTIVE SLEEP APNEA: The patient states her sleep apnea has been stable since the last clinic visit. There has not been any increase in hyper-somnolence. Encounter Diagnosis(ses):    Insulin re

## 2021-08-31 ENCOUNTER — TELEPHONE (OUTPATIENT)
Dept: NEUROLOGY | Facility: CLINIC | Age: 46
End: 2021-08-31

## 2021-08-31 ENCOUNTER — OFFICE VISIT (OUTPATIENT)
Dept: NEUROLOGY | Facility: CLINIC | Age: 46
End: 2021-08-31
Payer: MEDICAID

## 2021-08-31 VITALS
HEIGHT: 60 IN | BODY MASS INDEX: 57.52 KG/M2 | WEIGHT: 293 LBS | HEART RATE: 92 BPM | DIASTOLIC BLOOD PRESSURE: 90 MMHG | SYSTOLIC BLOOD PRESSURE: 130 MMHG

## 2021-08-31 DIAGNOSIS — G43.011 INTRACTABLE MIGRAINE WITHOUT AURA AND WITH STATUS MIGRAINOSUS: Primary | ICD-10-CM

## 2021-08-31 PROCEDURE — 99213 OFFICE O/P EST LOW 20 MIN: CPT | Performed by: OTHER

## 2021-08-31 PROCEDURE — 3008F BODY MASS INDEX DOCD: CPT | Performed by: OTHER

## 2021-08-31 PROCEDURE — 3080F DIAST BP >= 90 MM HG: CPT | Performed by: OTHER

## 2021-08-31 PROCEDURE — 3075F SYST BP GE 130 - 139MM HG: CPT | Performed by: OTHER

## 2021-08-31 RX ORDER — FREMANEZUMAB-VFRM 225 MG/1.5ML
225 INJECTION SUBCUTANEOUS
Qty: 1 EACH | Refills: 3 | Status: SHIPPED | OUTPATIENT
Start: 2021-08-31 | End: 2021-09-01

## 2021-08-31 NOTE — PATIENT INSTRUCTIONS
-Ajovy prefilled syringe - once every 28 days - subcutaneously in the abdomen, thigh, or upper arm  -Follow up in 3 months or sooner if needed.     -If you develop sudden onset loss of vision, double vision, room spinning/world spinning sensation, inability

## 2021-08-31 NOTE — PROGRESS NOTES
Sima Dub 37  [address]  [phone]          Established  Follow Up Visit       Date: August 31, 2021  Patient Name: Allison Dhillon   MRN: [id number]  Primary physician: Isaiah Edgar Solution, Apply topically 2 (two) times daily. , Disp: , Rfl:   Nystatin [id number] UNIT/GM External Powder, Apply 1 Application topically 4 (four) times daily.  Apply to affected areas, Disp: 30 g, Rfl: 1  hydrocortisone 1 % External Cream, Apply topically 2 (t Immature Granulocyte %      % 0.2   Glucose      70 - 99 mg/dL 127 (H)   Sodium      136 - 145 mmol/L 139   Potassium      3.5 - 5.1 mmol/L 3.8   Chloride      98 - 112 mmol/L 104   Carbon Dioxide, Total      21.0 - 32.0 mmol/L 30.0   ANION GAP      0 - 18 PLAN:  Migraine without aura with status migrainosus   -Preventative: Ajovy    Consideration: Emgality, verapamil  -Abortive: Almotriptan.  She has failed Sumatriptan and Rizatriptan (not effective and gave her significant side effects); no NSAID due to ARGELIA interactions.       While overall the reduction in headache frequency is similar to other preventive medications (anti-convulsants, anti-depressants, anti-hypertensive) they are certainly a great option for patients that have failed other medications or hav

## 2021-09-01 RX ORDER — FREMANEZUMAB-VFRM 225 MG/1.5ML
225 INJECTION SUBCUTANEOUS
Qty: 1 EACH | Refills: 3 | Status: SHIPPED | OUTPATIENT
Start: 2021-09-01 | End: 2021-09-01

## 2021-09-01 RX ORDER — FREMANEZUMAB-VFRM 225 MG/1.5ML
225 INJECTION SUBCUTANEOUS
Qty: 1 EACH | Refills: 3 | Status: SHIPPED | OUTPATIENT
Start: 2021-09-01 | End: 2021-12-22

## 2021-09-01 NOTE — TELEPHONE ENCOUNTER
Tere Sour has been approved from 6/1/21 to 3/1/22. Pharmacy notified and they do not have prescription for ajovy. Resent prescription for Ajovy today.

## 2021-09-01 NOTE — TELEPHONE ENCOUNTER
Received fax from pharmacy stating Leiaa Kevin was covered but they do not have it in stock.  Medication ordered & to come in 9/2/21

## 2021-09-09 ENCOUNTER — OFFICE VISIT (OUTPATIENT)
Dept: CARDIOLOGY CLINIC | Facility: CLINIC | Age: 46
End: 2021-09-09
Payer: MEDICAID

## 2021-09-09 VITALS
BODY MASS INDEX: 57.52 KG/M2 | SYSTOLIC BLOOD PRESSURE: 144 MMHG | WEIGHT: 293 LBS | DIASTOLIC BLOOD PRESSURE: 90 MMHG | HEIGHT: 60 IN | HEART RATE: 108 BPM

## 2021-09-09 DIAGNOSIS — R06.00 DYSPNEA, UNSPECIFIED TYPE: Primary | ICD-10-CM

## 2021-09-09 PROCEDURE — 3080F DIAST BP >= 90 MM HG: CPT | Performed by: NURSE PRACTITIONER

## 2021-09-09 PROCEDURE — 99213 OFFICE O/P EST LOW 20 MIN: CPT | Performed by: NURSE PRACTITIONER

## 2021-09-09 PROCEDURE — 3008F BODY MASS INDEX DOCD: CPT | Performed by: NURSE PRACTITIONER

## 2021-09-09 PROCEDURE — 3077F SYST BP >= 140 MM HG: CPT | Performed by: NURSE PRACTITIONER

## 2021-09-09 NOTE — PROGRESS NOTES
Leeann Cleveland is a 55year old female. Patient presents with:  Consult  Surgical/procedure Clearance: EKG 07/05/21    Gastric  Dyspnea: at times    HPI:   Patient comes in today for consultation.  She sees Dr. Paulina Mcpherson she has a history of sleep apnea obes affected area(s). 45 g 1   • Ketoconazole 2 % External Shampoo Use to shampoo as directed 3 times weekly 120 mL 12   • Minoxidil 2 % External Solution Apply topically 2 (two) times daily.      • Nystatin [id number] UNIT/GM External Powder Apply 1 Application to tenderness  EXTREMITIES: no cyanosis, clubbing or edema    ASSESSMENT AND PLAN:     Problem List Items Addressed This Visit     None      Visit Diagnoses     Dyspnea, unspecified type    -  Primary    Relevant Orders    CARD ECHO 2D DOPPLER (CPT=93306)

## 2021-09-13 ENCOUNTER — OFFICE VISIT (OUTPATIENT)
Dept: SURGERY | Facility: CLINIC | Age: 46
End: 2021-09-13
Payer: MEDICAID

## 2021-09-13 VITALS
HEIGHT: 58.5 IN | HEART RATE: 84 BPM | DIASTOLIC BLOOD PRESSURE: 74 MMHG | WEIGHT: 293 LBS | SYSTOLIC BLOOD PRESSURE: 122 MMHG | BODY MASS INDEX: 59.86 KG/M2 | OXYGEN SATURATION: 98 %

## 2021-09-13 DIAGNOSIS — E88.81 INSULIN RESISTANCE: ICD-10-CM

## 2021-09-13 DIAGNOSIS — R60.0 LOWER EXTREMITY EDEMA: ICD-10-CM

## 2021-09-13 DIAGNOSIS — R63.5 WEIGHT GAIN: Primary | ICD-10-CM

## 2021-09-13 DIAGNOSIS — F43.9 STRESS: ICD-10-CM

## 2021-09-13 DIAGNOSIS — E66.01 MORBID OBESITY WITH BMI OF 60.0-69.9, ADULT (HCC): ICD-10-CM

## 2021-09-13 PROCEDURE — 3078F DIAST BP <80 MM HG: CPT | Performed by: NURSE PRACTITIONER

## 2021-09-13 PROCEDURE — 3074F SYST BP LT 130 MM HG: CPT | Performed by: NURSE PRACTITIONER

## 2021-09-13 PROCEDURE — 99214 OFFICE O/P EST MOD 30 MIN: CPT | Performed by: NURSE PRACTITIONER

## 2021-09-13 PROCEDURE — 3008F BODY MASS INDEX DOCD: CPT | Performed by: NURSE PRACTITIONER

## 2021-09-13 NOTE — PROGRESS NOTES
[address]  Dept: [phone]       Patient:  Eric Meyers  :      1975  MRN:      [id number]    Chief Complaint:  Patient presents w (10 mg total) by mouth nightly as needed for Muscle spasms. 30 tablet 1   • Cholecalciferol (VITAMIN D) 50 MCG (2000 UT) Oral Cap Take by mouth. • Mometasone Furoate 0.1 % External Cream Apply 1 Application topically daily.  Apply a thin film to the aff Asked        Back Care: Not Asked        Exercise: No        Bike Helmet: Not Asked        Seat Belt: Not Asked        Self-Exams: Not Asked        Grew up on a farm: Not Asked        History of tanning: Not Asked        Outdoor occupation: Not Asked INTRAMURAL MYOMAS &/OR TOTAL WT >250 GMS, ABDOMINAL APPROACH  1995   • SKIN SURGERY  12/06/2017    R breast sebaceous cyst     Family History:    Family History   Problem Relation Age of Onset   • Hypertension Father    • Lipids Father         HYPERLIPIDEM negative    Physical Exam:   General appearance: alert, appears stated age and cooperative  Head: Normocephalic, without obvious abnormality, atraumatic  Back: symmetric, no curvature. ROM normal. No CVA tenderness.   Lungs: clear to auscultation bilaterall

## 2021-09-17 ENCOUNTER — TELEPHONE (OUTPATIENT)
Dept: BARIATRICS/WEIGHT MGMT | Age: 46
End: 2021-09-17

## 2021-09-22 ENCOUNTER — V-VISIT (OUTPATIENT)
Dept: BARIATRICS/WEIGHT MGMT | Age: 46
End: 2021-09-22

## 2021-09-22 DIAGNOSIS — G47.33 OBSTRUCTIVE SLEEP APNEA (ADULT) (PEDIATRIC): ICD-10-CM

## 2021-09-22 DIAGNOSIS — R63.2 BINGE EATING: ICD-10-CM

## 2021-09-22 DIAGNOSIS — B96.81 HELICOBACTER PYLORI GASTRITIS: ICD-10-CM

## 2021-09-22 DIAGNOSIS — R10.10 INTERMITTENT UPPER ABDOMINAL PAIN: ICD-10-CM

## 2021-09-22 DIAGNOSIS — E66.01 MORBID OBESITY (CMD): ICD-10-CM

## 2021-09-22 DIAGNOSIS — K21.9 GASTROESOPHAGEAL REFLUX DISEASE, UNSPECIFIED WHETHER ESOPHAGITIS PRESENT: Primary | ICD-10-CM

## 2021-09-22 DIAGNOSIS — E66.01 MORBID OBESITY WITH BODY MASS INDEX OF 60.0-69.9 IN ADULT (CMD): Primary | ICD-10-CM

## 2021-09-22 DIAGNOSIS — Z01.812 PRE-PROCEDURE LAB EXAM: ICD-10-CM

## 2021-09-22 DIAGNOSIS — G47.33 OSA ON CPAP: ICD-10-CM

## 2021-09-22 DIAGNOSIS — K29.70 HELICOBACTER PYLORI GASTRITIS: ICD-10-CM

## 2021-09-22 DIAGNOSIS — E88.810 INSULIN RESISTANCE SYNDROME: ICD-10-CM

## 2021-09-22 DIAGNOSIS — E55.9 VITAMIN D DEFICIENCY: ICD-10-CM

## 2021-09-22 DIAGNOSIS — M83.9 OSTEOMALACIA: ICD-10-CM

## 2021-09-22 DIAGNOSIS — R53.83 MALAISE AND FATIGUE: ICD-10-CM

## 2021-09-22 DIAGNOSIS — E78.2 MIXED HYPERLIPIDEMIA: ICD-10-CM

## 2021-09-22 DIAGNOSIS — R06.02 SHORTNESS OF BREATH ON EXERTION: ICD-10-CM

## 2021-09-22 DIAGNOSIS — R53.81 MALAISE AND FATIGUE: ICD-10-CM

## 2021-09-22 DIAGNOSIS — R63.5 WEIGHT GAIN: ICD-10-CM

## 2021-09-22 DIAGNOSIS — E61.1 IRON DEFICIENCY: ICD-10-CM

## 2021-09-22 PROBLEM — R60.0 LOWER EXTREMITY EDEMA: Status: ACTIVE | Noted: 2017-06-15

## 2021-09-22 PROCEDURE — 99245 OFF/OP CONSLTJ NEW/EST HI 55: CPT | Performed by: SURGERY

## 2021-09-22 RX ORDER — ALBUTEROL SULFATE 90 UG/1
2 AEROSOL, METERED RESPIRATORY (INHALATION) EVERY 4 HOURS PRN
COMMUNITY
Start: 2021-06-19 | End: 2021-10-12 | Stop reason: HOSPADM

## 2021-09-22 RX ORDER — OMEPRAZOLE 40 MG/1
40 CAPSULE, DELAYED RELEASE ORAL DAILY
COMMUNITY
Start: 2021-09-05 | End: 2021-11-19

## 2021-09-22 RX ORDER — NYSTATIN 100000 [USP'U]/G
POWDER TOPICAL PRN
COMMUNITY

## 2021-09-22 RX ORDER — ALMOTRIPTAN 12.5 MG/1
12.5 TABLET, FILM COATED ORAL PRN
COMMUNITY
Start: 2021-07-15

## 2021-09-22 RX ORDER — FREMANEZUMAB-VFRM 225 MG/1.5ML
225 INJECTION SUBCUTANEOUS
COMMUNITY
Start: 2021-09-03

## 2021-09-22 RX ORDER — METOCLOPRAMIDE 10 MG/1
TABLET ORAL PRN
COMMUNITY
Start: 2021-07-22 | End: 2021-12-23 | Stop reason: ALTCHOICE

## 2021-09-22 RX ORDER — MULTIVIT-MIN/IRON/FOLIC ACID/K 18-600-40
2000 CAPSULE ORAL DAILY
Status: ON HOLD | COMMUNITY
End: 2022-04-08 | Stop reason: HOSPADM

## 2021-09-22 RX ORDER — KETOCONAZOLE 20 MG/ML
SHAMPOO TOPICAL
COMMUNITY

## 2021-09-22 RX ORDER — VENLAFAXINE HYDROCHLORIDE 75 MG/1
75 CAPSULE, EXTENDED RELEASE ORAL EVERY EVENING
COMMUNITY

## 2021-09-22 RX ORDER — KETOROLAC TROMETHAMINE 10 MG/1
10 TABLET, FILM COATED ORAL EVERY 6 HOURS PRN
COMMUNITY
Start: 2021-07-22 | End: 2021-12-23 | Stop reason: ALTCHOICE

## 2021-09-22 RX ORDER — CYCLOBENZAPRINE HCL 10 MG
10 TABLET ORAL PRN
COMMUNITY
End: 2022-12-22 | Stop reason: ALTCHOICE

## 2021-09-22 ASSESSMENT — PATIENT HEALTH QUESTIONNAIRE - PHQ9
SUM OF ALL RESPONSES TO PHQ9 QUESTIONS 1 AND 2: 1
CLINICAL INTERPRETATION OF PHQ2 SCORE: NO FURTHER SCREENING NEEDED
2. FEELING DOWN, DEPRESSED OR HOPELESS: SEVERAL DAYS
1. LITTLE INTEREST OR PLEASURE IN DOING THINGS: NOT AT ALL
SUM OF ALL RESPONSES TO PHQ9 QUESTIONS 1 AND 2: 1
CLINICAL INTERPRETATION OF PHQ9 SCORE: NO FURTHER SCREENING NEEDED

## 2021-09-28 ENCOUNTER — TELEPHONE (OUTPATIENT)
Dept: BARIATRICS/WEIGHT MGMT | Age: 46
End: 2021-09-28

## 2021-10-05 ENCOUNTER — HOSPITAL ENCOUNTER (OUTPATIENT)
Dept: CV DIAGNOSTICS | Age: 46
Discharge: HOME | End: 2021-10-05
Attending: NURSE PRACTITIONER
Payer: MEDICAID

## 2021-10-05 DIAGNOSIS — R06.00 DYSPNEA, UNSPECIFIED TYPE: ICD-10-CM

## 2021-10-05 PROCEDURE — 93306 TTE W/DOPPLER COMPLETE: CPT | Performed by: NURSE PRACTITIONER

## 2021-10-05 RX ORDER — CYCLOBENZAPRINE HCL 10 MG
TABLET ORAL
Qty: 30 TABLET | Refills: 1 | Status: SHIPPED | OUTPATIENT
Start: 2021-10-05 | End: 2021-11-30

## 2021-10-06 ENCOUNTER — TELEPHONE (OUTPATIENT)
Dept: CARDIOLOGY CLINIC | Facility: CLINIC | Age: 46
End: 2021-10-06

## 2021-10-06 NOTE — TELEPHONE ENCOUNTER
Ciro Najera, APRN   10/6/2021  2:13 PM CDT Back to Top        Results reviewed and are stable, continue present management.  Echo is stable may proceed with surgery     Spoke with Eleanor Loza, she confirmed Dr. Ag Mcdonald from [facility] is doing her surgery

## 2021-10-09 ENCOUNTER — HOSPITAL ENCOUNTER (OUTPATIENT)
Dept: LAB | Age: 46
Discharge: HOME | End: 2021-10-09
Attending: SURGERY

## 2021-10-09 ENCOUNTER — LAB SERVICES (OUTPATIENT)
Dept: LAB | Age: 46
End: 2021-10-09

## 2021-10-09 DIAGNOSIS — E55.9 VITAMIN D DEFICIENCY: ICD-10-CM

## 2021-10-09 DIAGNOSIS — R53.83 MALAISE AND FATIGUE: ICD-10-CM

## 2021-10-09 DIAGNOSIS — E78.2 MIXED HYPERLIPIDEMIA: ICD-10-CM

## 2021-10-09 DIAGNOSIS — K29.70 HELICOBACTER PYLORI GASTRITIS: ICD-10-CM

## 2021-10-09 DIAGNOSIS — R63.2 BINGE EATING: ICD-10-CM

## 2021-10-09 DIAGNOSIS — E61.1 IRON DEFICIENCY: ICD-10-CM

## 2021-10-09 DIAGNOSIS — E66.01 MORBID OBESITY WITH BODY MASS INDEX OF 60.0-69.9 IN ADULT (CMD): ICD-10-CM

## 2021-10-09 DIAGNOSIS — K21.9 GASTROESOPHAGEAL REFLUX DISEASE, UNSPECIFIED WHETHER ESOPHAGITIS PRESENT: ICD-10-CM

## 2021-10-09 DIAGNOSIS — R53.81 MALAISE AND FATIGUE: ICD-10-CM

## 2021-10-09 DIAGNOSIS — M83.9 OSTEOMALACIA: ICD-10-CM

## 2021-10-09 DIAGNOSIS — R63.5 WEIGHT GAIN: ICD-10-CM

## 2021-10-09 DIAGNOSIS — G47.33 OSA ON CPAP: ICD-10-CM

## 2021-10-09 DIAGNOSIS — R10.10 INTERMITTENT UPPER ABDOMINAL PAIN: ICD-10-CM

## 2021-10-09 DIAGNOSIS — B96.81 HELICOBACTER PYLORI GASTRITIS: ICD-10-CM

## 2021-10-09 DIAGNOSIS — G47.33 OBSTRUCTIVE SLEEP APNEA (ADULT) (PEDIATRIC): ICD-10-CM

## 2021-10-09 DIAGNOSIS — Z01.812 PRE-PROCEDURE LAB EXAM: ICD-10-CM

## 2021-10-09 DIAGNOSIS — E88.810 INSULIN RESISTANCE SYNDROME: ICD-10-CM

## 2021-10-09 DIAGNOSIS — E66.01 MORBID OBESITY (CMD): ICD-10-CM

## 2021-10-09 DIAGNOSIS — R06.02 SHORTNESS OF BREATH ON EXERTION: ICD-10-CM

## 2021-10-09 LAB
25(OH)D3+25(OH)D2 SERPL-MCNC: 25.7 NG/ML (ref 30–100)
ALBUMIN SERPL-MCNC: 3.5 G/DL (ref 3.6–5.1)
ALBUMIN/GLOB SERPL: 0.8 {RATIO} (ref 1–2.4)
ALP SERPL-CCNC: 95 UNITS/L (ref 45–117)
ALT SERPL-CCNC: 30 UNITS/L
ANION GAP SERPL CALC-SCNC: 5 MMOL/L (ref 10–20)
AST SERPL-CCNC: 16 UNITS/L
BASOPHILS # BLD: 0 K/MCL (ref 0–0.3)
BASOPHILS NFR BLD: 1 %
BILIRUB SERPL-MCNC: 0.8 MG/DL (ref 0.2–1)
BUN SERPL-MCNC: 16 MG/DL (ref 6–20)
BUN/CREAT SERPL: 21 (ref 7–25)
CALCIUM SERPL-MCNC: 8.7 MG/DL (ref 8.4–10.2)
CHLORIDE SERPL-SCNC: 104 MMOL/L (ref 98–107)
CHOLEST SERPL-MCNC: 195 MG/DL
CHOLEST/HDLC SERPL: 4.9 {RATIO}
CO2 SERPL-SCNC: 30 MMOL/L (ref 21–32)
CREAT SERPL-MCNC: 0.76 MG/DL (ref 0.51–0.95)
DEPRECATED RDW RBC: 39.7 FL (ref 39–50)
EOSINOPHIL # BLD: 0.2 K/MCL (ref 0–0.5)
EOSINOPHIL NFR BLD: 3 %
ERYTHROCYTE [DISTWIDTH] IN BLOOD: 12.6 % (ref 11–15)
FASTING DURATION TIME PATIENT: ABNORMAL H
FASTING DURATION TIME PATIENT: ABNORMAL H
FERRITIN SERPL-MCNC: 145 NG/ML (ref 8–252)
GFR SERPLBLD BASED ON 1.73 SQ M-ARVRAT: >90 ML/MIN
GLOBULIN SER-MCNC: 4.2 G/DL (ref 2–4)
GLUCOSE SERPL-MCNC: 107 MG/DL (ref 70–99)
HCT VFR BLD CALC: 45.7 % (ref 36–46.5)
HDLC SERPL-MCNC: 40 MG/DL
HGB BLD-MCNC: 14.6 G/DL (ref 12–15.5)
IMM GRANULOCYTES # BLD AUTO: 0 K/MCL (ref 0–0.2)
IMM GRANULOCYTES # BLD: 0 %
IRON SATN MFR SERPL: 34 % (ref 15–45)
IRON SERPL-MCNC: 109 MCG/DL (ref 50–170)
LDLC SERPL CALC-MCNC: 137 MG/DL
LYMPHOCYTES # BLD: 1.6 K/MCL (ref 1–4.8)
LYMPHOCYTES NFR BLD: 25 %
MCH RBC QN AUTO: 27.5 PG (ref 26–34)
MCHC RBC AUTO-ENTMCNC: 31.9 G/DL (ref 32–36.5)
MCV RBC AUTO: 86.2 FL (ref 78–100)
MONOCYTES # BLD: 0.4 K/MCL (ref 0.3–0.9)
MONOCYTES NFR BLD: 7 %
NEUTROPHILS # BLD: 4.1 K/MCL (ref 1.8–7.7)
NEUTROPHILS NFR BLD: 64 %
NONHDLC SERPL-MCNC: 155 MG/DL
NRBC BLD MANUAL-RTO: 0 /100 WBC
PHOSPHATE SERPL-MCNC: 3.4 MG/DL (ref 2.4–4.7)
PLATELET # BLD AUTO: 267 K/MCL (ref 140–450)
POTASSIUM SERPL-SCNC: 4.1 MMOL/L (ref 3.4–5.1)
PROT SERPL-MCNC: 7.7 G/DL (ref 6.4–8.2)
RBC # BLD: 5.3 MIL/MCL (ref 4–5.2)
SARS-COV-2 RNA RESP QL NAA+PROBE: NOT DETECTED
SERVICE CMNT-IMP: NORMAL
SERVICE CMNT-IMP: NORMAL
SODIUM SERPL-SCNC: 135 MMOL/L (ref 135–145)
TIBC SERPL-MCNC: 319 MCG/DL (ref 250–450)
TRIGL SERPL-MCNC: 88 MG/DL
TSH SERPL-ACNC: 2.94 MCUNITS/ML (ref 0.35–5)
VIT B12 SERPL-MCNC: 609 PG/ML (ref 211–911)
WBC # BLD: 6.4 K/MCL (ref 4.2–11)

## 2021-10-09 PROCEDURE — 83540 ASSAY OF IRON: CPT | Performed by: SURGERY

## 2021-10-09 PROCEDURE — 80053 COMPREHEN METABOLIC PANEL: CPT | Performed by: SURGERY

## 2021-10-09 PROCEDURE — 82607 VITAMIN B-12: CPT | Performed by: SURGERY

## 2021-10-09 PROCEDURE — 85025 COMPLETE CBC W/AUTO DIFF WBC: CPT | Performed by: SURGERY

## 2021-10-09 PROCEDURE — 84100 ASSAY OF PHOSPHORUS: CPT | Performed by: SURGERY

## 2021-10-09 PROCEDURE — 83525 ASSAY OF INSULIN: CPT | Performed by: SURGERY

## 2021-10-09 PROCEDURE — 83036 HEMOGLOBIN GLYCOSYLATED A1C: CPT | Performed by: SURGERY

## 2021-10-09 PROCEDURE — 83970 ASSAY OF PARATHORMONE: CPT | Performed by: SURGERY

## 2021-10-09 PROCEDURE — 36415 COLL VENOUS BLD VENIPUNCTURE: CPT | Performed by: SURGERY

## 2021-10-09 PROCEDURE — 84425 ASSAY OF VITAMIN B-1: CPT | Performed by: SURGERY

## 2021-10-09 PROCEDURE — 82728 ASSAY OF FERRITIN: CPT | Performed by: SURGERY

## 2021-10-09 PROCEDURE — 80061 LIPID PANEL: CPT | Performed by: SURGERY

## 2021-10-09 PROCEDURE — 83550 IRON BINDING TEST: CPT | Performed by: SURGERY

## 2021-10-09 PROCEDURE — U0005 INFEC AGEN DETEC AMPLI PROBE: HCPCS | Performed by: SURGERY

## 2021-10-09 PROCEDURE — 84443 ASSAY THYROID STIM HORMONE: CPT | Performed by: SURGERY

## 2021-10-09 PROCEDURE — 82306 VITAMIN D 25 HYDROXY: CPT | Performed by: SURGERY

## 2021-10-09 PROCEDURE — U0003 INFECTIOUS AGENT DETECTION BY NUCLEIC ACID (DNA OR RNA); SEVERE ACUTE RESPIRATORY SYNDROME CORONAVIRUS 2 (SARS-COV-2) (CORONAVIRUS DISEASE [COVID-19]), AMPLIFIED PROBE TECHNIQUE, MAKING USE OF HIGH THROUGHPUT TECHNOLOGIES AS DESCRIBED BY CMS-2020-01-R: HCPCS | Performed by: SURGERY

## 2021-10-10 LAB
FASTING DURATION TIME PATIENT: NORMAL H
HBA1C MFR BLD: 5.7 % (ref 4.5–5.6)
INSULIN P FAST SERPL-ACNC: 24 MUNITS/L (ref 3–28)
PTH-INTACT SERPL-MCNC: 79 PG/ML (ref 19–88)

## 2021-10-11 ENCOUNTER — OFFICE VISIT (OUTPATIENT)
Dept: SURGERY | Facility: CLINIC | Age: 46
End: 2021-10-11
Payer: MEDICAID

## 2021-10-11 VITALS
WEIGHT: 293 LBS | BODY MASS INDEX: 59.86 KG/M2 | OXYGEN SATURATION: 98 % | SYSTOLIC BLOOD PRESSURE: 120 MMHG | HEIGHT: 58.5 IN | HEART RATE: 102 BPM | DIASTOLIC BLOOD PRESSURE: 76 MMHG

## 2021-10-11 DIAGNOSIS — K21.9 GASTROESOPHAGEAL REFLUX DISEASE, UNSPECIFIED WHETHER ESOPHAGITIS PRESENT: ICD-10-CM

## 2021-10-11 DIAGNOSIS — F43.9 STRESS: ICD-10-CM

## 2021-10-11 DIAGNOSIS — R60.0 LOWER EXTREMITY EDEMA: ICD-10-CM

## 2021-10-11 DIAGNOSIS — G47.33 OSA ON CPAP: ICD-10-CM

## 2021-10-11 DIAGNOSIS — R63.2 BINGE EATING: ICD-10-CM

## 2021-10-11 DIAGNOSIS — R63.5 WEIGHT GAIN: Primary | ICD-10-CM

## 2021-10-11 DIAGNOSIS — R53.82 CHRONIC FATIGUE: ICD-10-CM

## 2021-10-11 DIAGNOSIS — E88.81 INSULIN RESISTANCE: ICD-10-CM

## 2021-10-11 DIAGNOSIS — Z99.89 OSA ON CPAP: ICD-10-CM

## 2021-10-11 DIAGNOSIS — G43.011 INTRACTABLE MIGRAINE WITHOUT AURA AND WITH STATUS MIGRAINOSUS: ICD-10-CM

## 2021-10-11 DIAGNOSIS — E66.01 MORBID OBESITY WITH BMI OF 60.0-69.9, ADULT (HCC): ICD-10-CM

## 2021-10-11 PROCEDURE — 99214 OFFICE O/P EST MOD 30 MIN: CPT | Performed by: NURSE PRACTITIONER

## 2021-10-11 PROCEDURE — 3078F DIAST BP <80 MM HG: CPT | Performed by: NURSE PRACTITIONER

## 2021-10-11 PROCEDURE — 3074F SYST BP LT 130 MM HG: CPT | Performed by: NURSE PRACTITIONER

## 2021-10-11 PROCEDURE — 3008F BODY MASS INDEX DOCD: CPT | Performed by: NURSE PRACTITIONER

## 2021-10-11 RX ORDER — ERGOCALCIFEROL 1.25 MG/1
50000 CAPSULE ORAL
Qty: 12 CAPSULE | Refills: 0 | Status: SHIPPED | OUTPATIENT
Start: 2021-10-11 | End: 2022-01-03

## 2021-10-11 ASSESSMENT — ACTIVITIES OF DAILY LIVING (ADL)
HISTORY OF FALLING IN THE LAST YEAR (PRIOR TO ADMISSION): YES
SENSORY_SUPPORT_DEVICES: EYEGLASSES

## 2021-10-11 ASSESSMENT — COGNITIVE AND FUNCTIONAL STATUS - GENERAL
ARE YOU BLIND OR DO YOU HAVE SERIOUS DIFFICULTY SEEING, EVEN WHEN WEARING GLASSES: YES
ARE YOU DEAF OR DO YOU HAVE SERIOUS DIFFICULTY  HEARING: NO

## 2021-10-11 NOTE — PROGRESS NOTES
[address]  Dept: [phone]       Patient:  Pepper Pollen  :      1975  MRN:      [id number]    Chief Complaint:  Patient presents w Almotriptan Malate 12.5 MG Oral Tab use at onset; may repeat once after 4 hours- ONLY 2 IN 24 HOUR PERIOD MAX. This is a 30 day supply. 8 tablet 2   • Cholecalciferol (VITAMIN D) 50 MCG (2000 UT) Oral Cap Take by mouth.      • Mometasone Furoate 0.1 % Exte Concern: Not Asked        Weight Concern: Not Asked        Special Diet: Not Asked        Back Care: Not Asked        Exercise: No        Bike Helmet: Not Asked        Seat Belt: Not Asked        Self-Exams: Not Asked        Grew up on a farm: Not Asked •      • CYST ASPIRATION RIGHT     • MYOMECTOMY 5/> INTRAMURAL MYOMAS &/OR TOTAL WT >250 GMS, ABDOMINAL APPROACH     • SKIN SURGERY  2017    R breast sebaceous cyst     Family History:    Family History   Problem Relation Age of On negative  All other systems were reviewed and are negative    Physical Exam:   General appearance: alert, appears stated age and cooperative  Head: Normocephalic, without obvious abnormality, atraumatic  Back: symmetric, no curvature.  ROM normal. No CVA te recommended. Met with cardiology- echo stable- cleared for surgery EDISON Mijares. Planning to meet with pulmonologist 11/15/2021. Follow up for visit #6/6.     EDISON Moreno

## 2021-10-12 ENCOUNTER — HOSPITAL ENCOUNTER (OUTPATIENT)
Dept: GASTROENTEROLOGY | Age: 46
Discharge: HOME | End: 2021-10-12
Attending: SURGERY

## 2021-10-12 ENCOUNTER — ANESTHESIA (OUTPATIENT)
Dept: GASTROENTEROLOGY | Age: 46
End: 2021-10-12

## 2021-10-12 ENCOUNTER — ANESTHESIA EVENT (OUTPATIENT)
Dept: GASTROENTEROLOGY | Age: 46
End: 2021-10-12

## 2021-10-12 VITALS
HEART RATE: 77 BPM | TEMPERATURE: 97.9 F | HEIGHT: 60 IN | SYSTOLIC BLOOD PRESSURE: 155 MMHG | BODY MASS INDEX: 57.52 KG/M2 | OXYGEN SATURATION: 98 % | RESPIRATION RATE: 15 BRPM | DIASTOLIC BLOOD PRESSURE: 81 MMHG | WEIGHT: 293 LBS

## 2021-10-12 DIAGNOSIS — G47.33 OBSTRUCTIVE SLEEP APNEA (ADULT) (PEDIATRIC): ICD-10-CM

## 2021-10-12 DIAGNOSIS — E66.01 MORBID OBESITY (CMD): ICD-10-CM

## 2021-10-12 DIAGNOSIS — E78.2 MIXED HYPERLIPIDEMIA: ICD-10-CM

## 2021-10-12 DIAGNOSIS — K21.9 GASTROESOPHAGEAL REFLUX DISEASE, UNSPECIFIED WHETHER ESOPHAGITIS PRESENT: ICD-10-CM

## 2021-10-12 DIAGNOSIS — K44.9 HIATAL HERNIA: ICD-10-CM

## 2021-10-12 DIAGNOSIS — K29.70 GASTRITIS WITHOUT BLEEDING, UNSPECIFIED CHRONICITY, UNSPECIFIED GASTRITIS TYPE: ICD-10-CM

## 2021-10-12 LAB
B-HCG UR QL: NEGATIVE
INTERNAL PROCEDURAL CONTROLS ACCEPTABLE: YES

## 2021-10-12 PROCEDURE — 13000008 HB ANESTHESIA MAC OUTSIDE OR

## 2021-10-12 PROCEDURE — 10002801 HB RX 250 W/O HCPCS: Performed by: ANESTHESIOLOGY

## 2021-10-12 PROCEDURE — 10002807 HB RX 258: Performed by: ANESTHESIOLOGY

## 2021-10-12 PROCEDURE — 10002807 HB RX 258: Performed by: SURGERY

## 2021-10-12 PROCEDURE — 13000024 HB GI COMPLEX CASE S/U + 1ST 15 MIN

## 2021-10-12 PROCEDURE — 81025 URINE PREGNANCY TEST: CPT | Performed by: SURGERY

## 2021-10-12 PROCEDURE — 10004451 HB PACU RECOVERY 1ST 30 MINUTES

## 2021-10-12 PROCEDURE — 13000001 HB PHASE II RECOVERY EA 30 MINUTES

## 2021-10-12 PROCEDURE — 43239 EGD BIOPSY SINGLE/MULTIPLE: CPT | Performed by: SURGERY

## 2021-10-12 PROCEDURE — 88342 IMHCHEM/IMCYTCHM 1ST ANTB: CPT | Performed by: SURGERY

## 2021-10-12 PROCEDURE — 10002800 HB RX 250 W HCPCS: Performed by: ANESTHESIOLOGY

## 2021-10-12 RX ORDER — LIDOCAINE HYDROCHLORIDE 20 MG/ML
INJECTION, SOLUTION INFILTRATION; PERINEURAL PRN
Status: DISCONTINUED | OUTPATIENT
Start: 2021-10-12 | End: 2021-10-12

## 2021-10-12 RX ORDER — SODIUM CHLORIDE 9 MG/ML
INJECTION, SOLUTION INTRAVENOUS CONTINUOUS PRN
Status: DISCONTINUED | OUTPATIENT
Start: 2021-10-12 | End: 2021-10-12

## 2021-10-12 RX ORDER — PROPOFOL 10 MG/ML
INJECTION, EMULSION INTRAVENOUS PRN
Status: DISCONTINUED | OUTPATIENT
Start: 2021-10-12 | End: 2021-10-12

## 2021-10-12 RX ORDER — SODIUM CHLORIDE 9 MG/ML
INJECTION, SOLUTION INTRAVENOUS CONTINUOUS
Status: DISCONTINUED | OUTPATIENT
Start: 2021-10-12 | End: 2021-10-14 | Stop reason: HOSPADM

## 2021-10-12 RX ADMIN — SODIUM CHLORIDE: 9 INJECTION, SOLUTION INTRAVENOUS at 10:55

## 2021-10-12 RX ADMIN — PROPOFOL 100 MG: 10 INJECTION, EMULSION INTRAVENOUS at 10:58

## 2021-10-12 RX ADMIN — PROPOFOL 125 MCG/KG/MIN: 10 INJECTION, EMULSION INTRAVENOUS at 10:58

## 2021-10-12 RX ADMIN — SODIUM CHLORIDE: 0.9 INJECTION, SOLUTION INTRAVENOUS at 10:23

## 2021-10-12 RX ADMIN — PROPOFOL 100 MG: 10 INJECTION, EMULSION INTRAVENOUS at 11:03

## 2021-10-12 RX ADMIN — LIDOCAINE HYDROCHLORIDE 5 ML: 20 INJECTION, SOLUTION INFILTRATION; PERINEURAL at 10:58

## 2021-10-12 RX ADMIN — PROPOFOL 75 MG: 10 INJECTION, EMULSION INTRAVENOUS at 11:00

## 2021-10-12 ASSESSMENT — ACTIVITIES OF DAILY LIVING (ADL)
NEEDS_ASSIST: NO
ADL_SCORE: 12
ADL_BEFORE_ADMISSION: INDEPENDENT

## 2021-10-12 ASSESSMENT — PAIN SCALES - GENERAL
PAINLEVEL_OUTOF10: 0

## 2021-10-12 ASSESSMENT — ENCOUNTER SYMPTOMS: HEADACHES: 1

## 2021-10-12 ASSESSMENT — PAIN SCALES - WONG BAKER: WONGBAKER_NUMERICALRESPONSE: 0

## 2021-10-13 ENCOUNTER — HOSPITAL ENCOUNTER (OUTPATIENT)
Dept: GENERAL RADIOLOGY | Age: 46
Discharge: HOME | End: 2021-10-13
Attending: PHYSICAL MEDICINE & REHABILITATION
Payer: MEDICAID

## 2021-10-13 ENCOUNTER — TELEPHONE (OUTPATIENT)
Dept: GASTROENTEROLOGY | Age: 46
End: 2021-10-13

## 2021-10-13 ENCOUNTER — OFFICE VISIT (OUTPATIENT)
Dept: PHYSICAL MEDICINE AND REHAB | Facility: CLINIC | Age: 46
End: 2021-10-13
Payer: MEDICAID

## 2021-10-13 VITALS — BODY MASS INDEX: 57.52 KG/M2 | OXYGEN SATURATION: 97 % | HEART RATE: 80 BPM | HEIGHT: 60 IN | WEIGHT: 293 LBS

## 2021-10-13 DIAGNOSIS — M75.40 SUBACROMIAL IMPINGEMENT, UNSPECIFIED LATERALITY: ICD-10-CM

## 2021-10-13 DIAGNOSIS — M25.512 CHRONIC PAIN OF BOTH SHOULDERS: ICD-10-CM

## 2021-10-13 DIAGNOSIS — G89.29 CHRONIC PAIN OF BOTH SHOULDERS: ICD-10-CM

## 2021-10-13 DIAGNOSIS — M67.919 TENDINOPATHY OF ROTATOR CUFF, UNSPECIFIED LATERALITY: ICD-10-CM

## 2021-10-13 DIAGNOSIS — M99.9 BIOMECHANICAL LESION: ICD-10-CM

## 2021-10-13 DIAGNOSIS — M54.2 TRIGGER POINT OF NECK: ICD-10-CM

## 2021-10-13 DIAGNOSIS — M67.919 TENDINOPATHY OF ROTATOR CUFF, UNSPECIFIED LATERALITY: Primary | ICD-10-CM

## 2021-10-13 DIAGNOSIS — E66.01 CLASS 3 SEVERE OBESITY DUE TO EXCESS CALORIES WITH SERIOUS COMORBIDITY AND BODY MASS INDEX (BMI) GREATER THAN OR EQUAL TO 70 IN ADULT (HCC): ICD-10-CM

## 2021-10-13 DIAGNOSIS — G25.89 SCAPULAR DYSKINESIS: ICD-10-CM

## 2021-10-13 DIAGNOSIS — M79.10 MYALGIA: ICD-10-CM

## 2021-10-13 DIAGNOSIS — M25.511 CHRONIC PAIN OF BOTH SHOULDERS: ICD-10-CM

## 2021-10-13 PROCEDURE — 99244 OFF/OP CNSLTJ NEW/EST MOD 40: CPT | Performed by: PHYSICAL MEDICINE & REHABILITATION

## 2021-10-13 PROCEDURE — 3008F BODY MASS INDEX DOCD: CPT | Performed by: PHYSICAL MEDICINE & REHABILITATION

## 2021-10-13 PROCEDURE — 72050 X-RAY EXAM NECK SPINE 4/5VWS: CPT | Performed by: PHYSICAL MEDICINE & REHABILITATION

## 2021-10-13 PROCEDURE — 73030 X-RAY EXAM OF SHOULDER: CPT | Performed by: PHYSICAL MEDICINE & REHABILITATION

## 2021-10-13 RX ORDER — NAPROXEN 500 MG/1
500 TABLET ORAL 2 TIMES DAILY WITH MEALS
Qty: 60 TABLET | Refills: 0 | Status: SHIPPED | OUTPATIENT
Start: 2021-10-13 | End: 2021-11-30

## 2021-10-13 NOTE — PATIENT INSTRUCTIONS
1) Get XR of the cervical spine and bilateral shoudlers today on your way out  2) Begin formal physical therapy athe [facility] facility as soon as possible to try and begin treatment prior to your surgeries.   3) Tylenol 500-1000 mg every 6-8 hours as needed

## 2021-10-13 NOTE — H&P
[address] H&P    Requesting Physician: Leonor Adan MD    Chief Complaint (Reason for Visit):  Patient presents with:  Back Pain: New left handed pt comes upper back that radiates down both shoul Ovarian cyst    • Sleep apnea    • Varicose vein        PAST SURGICAL HISTORY:   Past Surgical History:   Procedure Laterality Date   •      • CYST ASPIRATION RIGHT     • MYOMECTOMY 5/> INTRAMURAL MYOMAS &/OR TOTAL WT >250 GMS, ABDOMINAL A use at onset; may repeat once after 4 hours- ONLY 2 IN 24 HOUR PERIOD MAX. This is a 30 day supply. 8 tablet 2   • Cholecalciferol (VITAMIN D) 50 MCG (2000 UT) Oral Cap Take by mouth.      • Mometasone Furoate 0.1 % External Cream Apply 1 Application topic comprehention intact, spontaneous speech intact  Motor:    Musculoskeletal:    SHOULDER:  Inspection: no erythema, swelling, or obvious deformity. No AC joint deformity  Palpation: no ttp over clavical, AC joint, or scapular spine.   Tender to palpation wi U/L Final   • AST 07/15/2021 18  15 - 37 U/L Final   • Alkaline Phosphatase 07/15/2021 100* 37 - 98 U/L Final   • Bilirubin, Total 07/15/2021 0.4  0.1 - 2.0 mg/dL Final   • Total Protein 07/15/2021 8.0  6.4 - 8.2 g/dL Final   • Albumin 07/15/2021 3.6  3.4 06/18/2021 137  136 - 145 mmol/L Final   • Potassium 06/18/2021 3.7  3.5 - 5.1 mmol/L Final   • Chloride 06/18/2021 106  98 - 112 mmol/L Final   • CO2 06/18/2021 22.0  21.0 - 32.0 mmol/L Final   • Anion Gap 06/18/2021 9  0 - 18 mmol/L Final   • BUN 06/18/2 0.6  % Final   • Troponin 06/19/2021 <0.045  <0.045 ng/mL Final   • Pro-Beta Natriuretic Peptide 06/18/2021 200* <125 pg/mL Final   Admission on 05/10/2021, Discharged on 05/10/2021   Component Date Value Ref Range Status   • POCT Rapid Strep 05/10/2021 Po laterality  (primary encounter diagnosis)  Chronic pain of both shoulders  Myalgia  Class 3 severe obesity due to excess calories with serious comorbidity and body mass index (BMI) greater than or equal to 70 in [facility])  Biomechanical lesion  Scapular

## 2021-10-14 LAB
ASR DISCLAIMER: NORMAL
CASE RPRT: NORMAL
CLINICAL INFO: NORMAL
PATH REPORT.FINAL DX SPEC: NORMAL
PATH REPORT.GROSS SPEC: NORMAL

## 2021-10-15 ENCOUNTER — OFFICE VISIT (OUTPATIENT)
Dept: PULMONOLOGY | Facility: CLINIC | Age: 46
End: 2021-10-15
Payer: MEDICAID

## 2021-10-15 VITALS
WEIGHT: 293 LBS | BODY MASS INDEX: 57.52 KG/M2 | DIASTOLIC BLOOD PRESSURE: 87 MMHG | HEART RATE: 93 BPM | SYSTOLIC BLOOD PRESSURE: 140 MMHG | OXYGEN SATURATION: 97 % | RESPIRATION RATE: 14 BRPM | HEIGHT: 60 IN

## 2021-10-15 DIAGNOSIS — E66.01 CLASS 3 SEVERE OBESITY DUE TO EXCESS CALORIES WITHOUT SERIOUS COMORBIDITY WITH BODY MASS INDEX (BMI) OF 60.0 TO 69.9 IN ADULT (HCC): ICD-10-CM

## 2021-10-15 DIAGNOSIS — Z01.811 ENCOUNTER FOR PREOPERATIVE PULMONARY EXAMINATION: Primary | ICD-10-CM

## 2021-10-15 DIAGNOSIS — R91.1 INCIDENTAL PULMONARY NODULE: ICD-10-CM

## 2021-10-15 DIAGNOSIS — Z99.89 OSA ON CPAP: ICD-10-CM

## 2021-10-15 DIAGNOSIS — G47.33 OSA ON CPAP: ICD-10-CM

## 2021-10-15 DIAGNOSIS — Z87.891 HISTORY OF TOBACCO ABUSE: ICD-10-CM

## 2021-10-15 DIAGNOSIS — F51.04 PSYCHOPHYSIOLOGICAL INSOMNIA: ICD-10-CM

## 2021-10-15 LAB — VIT B1 PYROPHOSHATE BLD-SCNC: 101 NMOL/L (ref 70–180)

## 2021-10-15 PROCEDURE — 3008F BODY MASS INDEX DOCD: CPT | Performed by: PHYSICIAN ASSISTANT

## 2021-10-15 PROCEDURE — 99204 OFFICE O/P NEW MOD 45 MIN: CPT | Performed by: PHYSICIAN ASSISTANT

## 2021-10-15 PROCEDURE — 3079F DIAST BP 80-89 MM HG: CPT | Performed by: PHYSICIAN ASSISTANT

## 2021-10-15 PROCEDURE — 3077F SYST BP >= 140 MM HG: CPT | Performed by: PHYSICIAN ASSISTANT

## 2021-10-15 NOTE — PROGRESS NOTES
Pulmonary Consult Note    History of Present Illness:  Sana Avalos is a 55year old female presenting to pulmonary clinic today for preoperative pulmonary evaluation for gastric bypass at [facility]. No date for surgery yet.   She has gained 100 poun where she cannot turn her mind off. She has used sleep aids in the past such as melatonin although none recently. She does feel tired when she gets to bed. She drinks caffeine–1 cup of coffee in the morning and another diet soda at dinner.   She has noct Calm, cooperative. Pleasant affect.     Results:  -Sleep study 2015: AHI 26 events per hour, titrated to CPAP 6 CWP    -CT chest 6/2021: CONCLUSION:   Limited evaluation pulmonary arteries.  No evidence of pulmonary embolism to the bilateral lobar to large night all night  -Weight loss  -Avoid alcohol and sedating drugs  -Never drive if sleep  -Sleep hygiene and literature provided  -F/u at the 1-year interval    # Abnormal CT chest - CT chest 6/2021 with nonspecific mild axillary adenopathy and 0.4 cm LLL p

## 2021-10-18 ENCOUNTER — HOSPITAL ENCOUNTER (OUTPATIENT)
Dept: ULTRASOUND IMAGING | Age: 46
Discharge: HOME | End: 2021-10-18
Attending: SURGERY

## 2021-10-18 DIAGNOSIS — Z01.812 PRE-PROCEDURE LAB EXAM: ICD-10-CM

## 2021-10-18 DIAGNOSIS — E61.1 IRON DEFICIENCY: ICD-10-CM

## 2021-10-18 DIAGNOSIS — R63.5 WEIGHT GAIN: ICD-10-CM

## 2021-10-18 DIAGNOSIS — M83.9 OSTEOMALACIA: ICD-10-CM

## 2021-10-18 DIAGNOSIS — R06.02 SHORTNESS OF BREATH ON EXERTION: ICD-10-CM

## 2021-10-18 DIAGNOSIS — E55.9 VITAMIN D DEFICIENCY: ICD-10-CM

## 2021-10-18 DIAGNOSIS — R53.81 MALAISE AND FATIGUE: ICD-10-CM

## 2021-10-18 DIAGNOSIS — E66.01 MORBID OBESITY WITH BODY MASS INDEX OF 60.0-69.9 IN ADULT (CMD): ICD-10-CM

## 2021-10-18 DIAGNOSIS — R63.2 BINGE EATING: ICD-10-CM

## 2021-10-18 DIAGNOSIS — R53.83 MALAISE AND FATIGUE: ICD-10-CM

## 2021-10-18 DIAGNOSIS — G47.33 OSA ON CPAP: ICD-10-CM

## 2021-10-18 DIAGNOSIS — B96.81 HELICOBACTER PYLORI GASTRITIS: ICD-10-CM

## 2021-10-18 DIAGNOSIS — R10.10 INTERMITTENT UPPER ABDOMINAL PAIN: ICD-10-CM

## 2021-10-18 DIAGNOSIS — E88.810 INSULIN RESISTANCE SYNDROME: ICD-10-CM

## 2021-10-18 DIAGNOSIS — K29.70 HELICOBACTER PYLORI GASTRITIS: ICD-10-CM

## 2021-10-18 PROCEDURE — 76705 ECHO EXAM OF ABDOMEN: CPT

## 2021-10-20 ENCOUNTER — TELEPHONE (OUTPATIENT)
Dept: PULMONOLOGY | Facility: CLINIC | Age: 46
End: 2021-10-20

## 2021-10-20 DIAGNOSIS — Z87.891 HISTORY OF TOBACCO ABUSE: ICD-10-CM

## 2021-10-20 DIAGNOSIS — R91.1 PULMONARY NODULE: Primary | ICD-10-CM

## 2021-10-22 ENCOUNTER — TELEPHONE (OUTPATIENT)
Dept: BARIATRICS/WEIGHT MGMT | Age: 46
End: 2021-10-22

## 2021-10-24 ENCOUNTER — TELEPHONE (OUTPATIENT)
Dept: GASTROENTEROLOGY | Age: 46
End: 2021-10-24

## 2021-10-25 ENCOUNTER — TELEPHONE (OUTPATIENT)
Dept: GASTROENTEROLOGY | Age: 46
End: 2021-10-25

## 2021-10-25 ENCOUNTER — PATIENT MESSAGE (OUTPATIENT)
Dept: INTERNAL MEDICINE CLINIC | Facility: CLINIC | Age: 46
End: 2021-10-25

## 2021-10-26 NOTE — TELEPHONE ENCOUNTER
Pt calling back stating that she could not make it into her scheduled pre op visit today due to another appt. But pt stating she does not think she needs a pre op visit and stated  is aware of her surgery that she is getting.  She states she just needs or

## 2021-10-26 NOTE — TELEPHONE ENCOUNTER
Patient returned call, scheduled for pre op physical examination.     Future Appointments   Date Time Provider Isaiah Ortiz   10/29/2021 11:20 AM ERIN MUNIZ 1 ERIN MUNIZ  [place]   11/5/2021  1:00 PM Chelsea Tolbert MD [facility]   11/15/2021 1

## 2021-10-26 NOTE — TELEPHONE ENCOUNTER
Advised patient of Fela's note from Dr. Nelson Walker. Patient verbalized understanding. She is also requesting the recommendations be sent to her through [address] MyChart sent to patient as requested.       She would like to know from Dr. Eliazar Luevano if she stil

## 2021-10-26 NOTE — TELEPHONE ENCOUNTER
From: Jayson Reilly  To: Mel Damon MD  Sent: 10/25/2021 5:22 PM CDT  Subject: Pre- surgery test    I need to get a referral to schedule a pH and manometry test. This is needed for [facility]

## 2021-10-26 NOTE — TELEPHONE ENCOUNTER
To reception staff, pls call pt for pre op appt. Also MitraSpanhart message with recommendation sent to pt. LOV 6-25-21.       Future Appointments   Date Time Provider Isaiah Ortiz   10/29/2021 11:20 AM ERIN MUNIZ RM1 [facility] EM [place]   11/15/2021 11:45 AM

## 2021-10-26 NOTE — TELEPHONE ENCOUNTER
Left message for patient. Information as to whom patient will be seeing is needed. RECOMMENDATIONS:     1.  Given the endoscopy findings the patient is best suited for a alie en y gastric bypass which is what the patient is interested in.   2. The pat

## 2021-10-29 ENCOUNTER — HOSPITAL ENCOUNTER (OUTPATIENT)
Dept: MAMMOGRAPHY | Age: 46
Discharge: HOME | End: 2021-10-29
Attending: OBSTETRICS & GYNECOLOGY
Payer: MEDICAID

## 2021-10-29 DIAGNOSIS — Z12.31 ENCOUNTER FOR SCREENING MAMMOGRAM FOR BREAST CANCER: ICD-10-CM

## 2021-10-29 PROCEDURE — 77067 SCR MAMMO BI INCL CAD: CPT | Performed by: OBSTETRICS & GYNECOLOGY

## 2021-10-29 PROCEDURE — 77063 BREAST TOMOSYNTHESIS BI: CPT | Performed by: OBSTETRICS & GYNECOLOGY

## 2021-11-03 ENCOUNTER — OFFICE VISIT (OUTPATIENT)
Dept: BARIATRICS/WEIGHT MGMT | Age: 46
End: 2021-11-03

## 2021-11-03 DIAGNOSIS — E66.01 MORBID OBESITY WITH BODY MASS INDEX OF 60.0-69.9 IN ADULT (CMD): Primary | ICD-10-CM

## 2021-11-03 DIAGNOSIS — G43.909 MIGRAINE WITHOUT STATUS MIGRAINOSUS, NOT INTRACTABLE, UNSPECIFIED MIGRAINE TYPE: ICD-10-CM

## 2021-11-03 DIAGNOSIS — K21.9 GASTROESOPHAGEAL REFLUX DISEASE, UNSPECIFIED WHETHER ESOPHAGITIS PRESENT: ICD-10-CM

## 2021-11-03 DIAGNOSIS — E88.810 INSULIN RESISTANCE SYNDROME: ICD-10-CM

## 2021-11-03 DIAGNOSIS — G47.33 OSA ON CPAP: ICD-10-CM

## 2021-11-03 PROCEDURE — 99214 OFFICE O/P EST MOD 30 MIN: CPT | Performed by: NURSE PRACTITIONER

## 2021-11-03 RX ORDER — OMEPRAZOLE 40 MG/1
40 CAPSULE, DELAYED RELEASE ORAL DAILY
Qty: 90 CAPSULE | Refills: 1 | Status: SHIPPED | OUTPATIENT
Start: 2021-11-03 | End: 2021-11-30

## 2021-11-03 ASSESSMENT — PATIENT HEALTH QUESTIONNAIRE - PHQ9
SUM OF ALL RESPONSES TO PHQ9 QUESTIONS 1 AND 2: 0
SUM OF ALL RESPONSES TO PHQ9 QUESTIONS 1 AND 2: 0
2. FEELING DOWN, DEPRESSED OR HOPELESS: NOT AT ALL
CLINICAL INTERPRETATION OF PHQ9 SCORE: NO FURTHER SCREENING NEEDED
1. LITTLE INTEREST OR PLEASURE IN DOING THINGS: NOT AT ALL
CLINICAL INTERPRETATION OF PHQ2 SCORE: NO FURTHER SCREENING NEEDED
SUM OF ALL RESPONSES TO PHQ9 QUESTIONS 1 AND 2: 0

## 2021-11-03 ASSESSMENT — ENCOUNTER SYMPTOMS
HEADACHES: 1
VOMITING: 0
ABDOMINAL PAIN: 0
RECTAL PAIN: 0
APNEA: 1
ANAL BLEEDING: 0
CONSTIPATION: 0
PSYCHIATRIC NEGATIVE: 1
CONSTITUTIONAL NEGATIVE: 1
ABDOMINAL DISTENTION: 0
DIARRHEA: 0
ROS GI COMMENTS: POSITIVE FOR GERD
BLOOD IN STOOL: 0
NAUSEA: 0

## 2021-11-03 NOTE — TELEPHONE ENCOUNTER
Refill passed per [facility] protocol. Requested Prescriptions   Pending Prescriptions Disp Refills    OMEPRAZOLE 40 MG Oral Capsule Delayed Release [Pharmacy Med Name: OMEPRAZOLE 40MG CAPSULES] 90 capsule 0     Sig: TAKE 1 CAPSULE(40 MG) BY MOUTH DAILY        Gastrointestional Medication Protocol Passed - 11/3/2021  2:23 PM        Passed - Appointment in past 12 or next 3 months               Recent Outpatient Visits              2 weeks ago Encounter for preoperative pulmonary examination    [facility] [address] [facility], [address] [facility], [place], [place]    Office Visit    3 weeks ago Tendinopathy of rotator cuff, unspecified laterality    [address] [facility], [place]-Physiatry Donna Langston MD    Office Visit    3 weeks ago [facility]    [address], [address], Charu Brownlee, APRN    Office Visit    [facility]    [address] [facility], Navid Brownlee APRN    Office Visit    1 month ago Dyspnea, unspecified type    [facility] EDISON Winn    Office Visit            Future Appointments         Provider Department Appt Notes    In 2 days Aydee Gan MD [facility], [facility] pre op clearance    In 1 week Rizwan Mcdermott MD [address], [address], [facility] OP F/U VISIT #6  [facility]    In 2 weeks Jacqueline Machado MD [facility] Ophthalmology ee-informed of policy    In 3 weeks Clinton Huizar,  [address] in [address] [facility] [address] [facility] 9    In 3 weeks MD KRYSTAL Larsen, [address], [place]-Physiatry f/u in 6 weeks    In 4 weeks aYny [place],  [address] in [place]?   [employer] FHP    In 1 month Yen Slade,  [address] in [facility] 34    In 1 month Yany [place], PT [facility] in [place] [address]    In 1 month [facility], [address] in [place] [address]    In 1 month [address] [facility], [address] in [place] [address]    In 1 month [facility], [address] in [place] [address]    In 1 month [address] [facility], [address] in [place] [address]

## 2021-11-04 ENCOUNTER — TELEPHONE (OUTPATIENT)
Dept: BARIATRICS/WEIGHT MGMT | Age: 46
End: 2021-11-04

## 2021-11-05 ENCOUNTER — OFFICE VISIT (OUTPATIENT)
Dept: INTERNAL MEDICINE CLINIC | Facility: CLINIC | Age: 46
End: 2021-11-05
Payer: MEDICAID

## 2021-11-05 ENCOUNTER — HOSPITAL ENCOUNTER (OUTPATIENT)
Dept: GENERAL RADIOLOGY | Age: 46
Discharge: HOME | End: 2021-11-05

## 2021-11-05 ENCOUNTER — HOSPITAL ENCOUNTER (OUTPATIENT)
Dept: LAB | Age: 46
Discharge: HOME | End: 2021-11-05

## 2021-11-05 VITALS
SYSTOLIC BLOOD PRESSURE: 124 MMHG | WEIGHT: 293 LBS | BODY MASS INDEX: 57.52 KG/M2 | DIASTOLIC BLOOD PRESSURE: 83 MMHG | HEART RATE: 79 BPM | HEIGHT: 60 IN

## 2021-11-05 DIAGNOSIS — E61.1 IRON DEFICIENCY: ICD-10-CM

## 2021-11-05 DIAGNOSIS — K44.9 HIATAL HERNIA: Primary | ICD-10-CM

## 2021-11-05 DIAGNOSIS — R06.02 SHORTNESS OF BREATH ON EXERTION: ICD-10-CM

## 2021-11-05 DIAGNOSIS — E88.810 INSULIN RESISTANCE SYNDROME: ICD-10-CM

## 2021-11-05 DIAGNOSIS — E66.01 MORBID OBESITY WITH BODY MASS INDEX OF 60.0-69.9 IN ADULT (CMD): ICD-10-CM

## 2021-11-05 DIAGNOSIS — B96.81 HELICOBACTER PYLORI GASTRITIS: ICD-10-CM

## 2021-11-05 DIAGNOSIS — R63.2 BINGE EATING: ICD-10-CM

## 2021-11-05 DIAGNOSIS — M83.9 OSTEOMALACIA: ICD-10-CM

## 2021-11-05 DIAGNOSIS — R10.10 INTERMITTENT UPPER ABDOMINAL PAIN: ICD-10-CM

## 2021-11-05 DIAGNOSIS — R53.81 MALAISE AND FATIGUE: ICD-10-CM

## 2021-11-05 DIAGNOSIS — G47.33 OSA ON CPAP: ICD-10-CM

## 2021-11-05 DIAGNOSIS — E55.9 VITAMIN D DEFICIENCY: ICD-10-CM

## 2021-11-05 DIAGNOSIS — Z01.818 PREOPERATIVE CLEARANCE: ICD-10-CM

## 2021-11-05 DIAGNOSIS — R63.5 WEIGHT GAIN: ICD-10-CM

## 2021-11-05 DIAGNOSIS — R53.83 MALAISE AND FATIGUE: ICD-10-CM

## 2021-11-05 DIAGNOSIS — E66.01 MORBID OBESITY (HCC): ICD-10-CM

## 2021-11-05 DIAGNOSIS — Z01.812 PRE-PROCEDURE LAB EXAM: ICD-10-CM

## 2021-11-05 DIAGNOSIS — K29.70 HELICOBACTER PYLORI GASTRITIS: ICD-10-CM

## 2021-11-05 DIAGNOSIS — Z98.84 BARIATRIC SURGERY STATUS: ICD-10-CM

## 2021-11-05 LAB
ATRIAL RATE (BPM): 82
P AXIS (DEGREES): 16
PR-INTERVAL (MSEC): 128
QRS-INTERVAL (MSEC): 66
QT-INTERVAL (MSEC): 364
QTC: 425
R AXIS (DEGREES): 44
REPORT TEXT: NORMAL
T AXIS (DEGREES): 41
VENTRICULAR RATE EKG/MIN (BPM): 82

## 2021-11-05 PROCEDURE — 99214 OFFICE O/P EST MOD 30 MIN: CPT | Performed by: INTERNAL MEDICINE

## 2021-11-05 PROCEDURE — 3008F BODY MASS INDEX DOCD: CPT | Performed by: INTERNAL MEDICINE

## 2021-11-05 PROCEDURE — 71046 X-RAY EXAM CHEST 2 VIEWS: CPT

## 2021-11-05 PROCEDURE — 93005 ELECTROCARDIOGRAM TRACING: CPT

## 2021-11-05 PROCEDURE — 3079F DIAST BP 80-89 MM HG: CPT | Performed by: INTERNAL MEDICINE

## 2021-11-05 PROCEDURE — 3074F SYST BP LT 130 MM HG: CPT | Performed by: INTERNAL MEDICINE

## 2021-11-05 RX ORDER — COLLAGENASE CLOSTRIDIUM HIST.
POWDER (EA) MISCELLANEOUS
COMMUNITY

## 2021-11-05 NOTE — TELEPHONE ENCOUNTER
Case d/w Dr. Rosalee Merino.  CT chest at 12 month interval optional. Spoke with patient and discussed risks vs benefits of 12 month survelliance for small pulmonary nodule, axillary adenopathy vs expectant management and patient wishes to pursue noncontrast CT ch

## 2021-11-05 NOTE — TELEPHONE ENCOUNTER
Patient returned call, please call at [phone],IUNDFZ.   *Patient has an appointment with another provider at 1 pm and will be unavailable

## 2021-11-06 RX ORDER — VENLAFAXINE HYDROCHLORIDE 75 MG/1
CAPSULE, EXTENDED RELEASE ORAL
Qty: 90 CAPSULE | Refills: 1 | OUTPATIENT
Start: 2021-11-06

## 2021-11-06 NOTE — TELEPHONE ENCOUNTER
Last annual - 2/5/2021    Rx was given at that visit for 6 month supply of venlaflaxine HCL ER 75mg capsules. On 8/5/2021 refills were sent for 3 months with 1 refill. Pt should still have refills. Refills denied. Pt  Should still have refills.

## 2021-11-08 ENCOUNTER — TELEPHONE (OUTPATIENT)
Dept: GASTROENTEROLOGY | Facility: CLINIC | Age: 46
End: 2021-11-08

## 2021-11-08 ENCOUNTER — OFFICE VISIT (OUTPATIENT)
Dept: GASTROENTEROLOGY | Facility: CLINIC | Age: 46
End: 2021-11-08
Payer: MEDICAID

## 2021-11-08 VITALS
SYSTOLIC BLOOD PRESSURE: 142 MMHG | BODY MASS INDEX: 57.52 KG/M2 | WEIGHT: 293 LBS | HEIGHT: 60 IN | HEART RATE: 87 BPM | DIASTOLIC BLOOD PRESSURE: 92 MMHG

## 2021-11-08 DIAGNOSIS — K21.9 GASTROESOPHAGEAL REFLUX DISEASE WITHOUT ESOPHAGITIS: Primary | ICD-10-CM

## 2021-11-08 DIAGNOSIS — K20.90 ESOPHAGITIS: Primary | ICD-10-CM

## 2021-11-08 PROCEDURE — 3077F SYST BP >= 140 MM HG: CPT | Performed by: INTERNAL MEDICINE

## 2021-11-08 PROCEDURE — 99245 OFF/OP CONSLTJ NEW/EST HI 55: CPT | Performed by: INTERNAL MEDICINE

## 2021-11-08 PROCEDURE — 3008F BODY MASS INDEX DOCD: CPT | Performed by: INTERNAL MEDICINE

## 2021-11-08 PROCEDURE — 3080F DIAST BP >= 90 MM HG: CPT | Performed by: INTERNAL MEDICINE

## 2021-11-08 NOTE — PATIENT INSTRUCTIONS
# Average Risk screening: patient is considered average risk for colon cancer (denies family hx of colon cancer) and it is appropriate to proceed with screening colonoscopy.  Patient is currently asymptomatic and denies diarrhea, hematochezia, thin-stools o

## 2021-11-08 NOTE — H&P
[address] [facility] [address] Gastroenterology                                                                                                  Clinic History and Physical     Pa • H/O seasonal allergies    • Migraines    • Morbid obesity with BMI of 50.0-59.9, adult ([facility] [address]    • ANJELICA on CPAP    • Ovarian cyst    • Sleep apnea    • Varicose vein       Past Surgical History:   Procedure Laterality Date   •      • CYS daily. 90 capsule 1   • Almotriptan Malate 12.5 MG Oral Tab use at onset; may repeat once after 4 hours- ONLY 2 IN 24 HOUR PERIOD MAX. This is a 30 day supply. 8 tablet 2   • Cholecalciferol (VITAMIN D) 50 MCG (2000 UT) Oral Cap Take by mouth.      • Brandt quadrants without rigidity or guarding, non-distended, no abnormal bowel sounds noted, no masses are palpated  Skin- No jaundice  Ext: no cyanosis, clubbing or edema is evident.    Neuro- Alert and interactive, and gross movements of extremities normal    L Will run by insurance and confirm. Doing bravo when there is severe esophagitis is contraindicated. Other option is repeat EGD and decide at that time if bravo is needed.   # Manometry for motility prior to surgery    Recommend:  - Schedule manometry prior

## 2021-11-15 NOTE — PROGRESS NOTES
HPI:    Patient ID: Ruthann Rocha is a 55year old female.     HPI    Follow  Planned Bariatric Surgery AT [facility]  Pt had EGD done 10/12/21 with hiatal hemia and herniated esophagus  Pt is recommended to get manomentry  And she is also due for c route.     • Omeprazole 40 MG Oral Capsule Delayed Release Take 1 capsule (40 mg total) by mouth daily. 90 capsule 1   • naproxen (NAPROSYN) 500 MG Oral Tab Take 1 tablet (500 mg total) by mouth 2 (two) times daily with meals.  Take for 2 weeks as directed INTRAMURAL MYOMAS &/OR TOTAL WT >250 GMS, ABDOMINAL APPROACH  1995   • SKIN SURGERY  12/06/2017    R breast sebaceous cyst      Family History   Problem Relation Age of Onset   • Hypertension Father    • Lipids Father         HYPERLIPIDEMIA   • Diabetes Fa Musculoskeletal:         General: No swelling. Cervical back: Neck supple. Lymphadenopathy:      Cervical: No cervical adenopathy. Neurological:      Mental Status: She is alert.               ASSESSMENT/PLAN:   (K44.9) Hiatal hernia  (primary en sleep time to allow for additional hours of consecutive sleep.  Discussed sleep hygiene and recommend patient turn clock face away from her, reduce caffeine, and set aside worry time prior to bed.  -Plan:  -Empirically increase CPAP to 8 CWP  -Vigilance wit

## 2021-11-15 NOTE — PROGRESS NOTES
[address], [address]  Dept: [phone]       Patient:  Jaron Lentz  :      1975  MRN:      [id number]    Chief Complaint:  Patient presents wit Subcutaneous Solution Prefilled Syringe Inject 225 mg into the skin every 28 days. 1 each 3   • venlafaxine 75 MG Oral Capsule SR 24 Hr Take 1 capsule (75 mg total) by mouth daily.  90 capsule 1   • Almotriptan Malate 12.5 MG Oral Tab use at onset; may repe Partners: Male    Other Topics      Concerns:         Service: Not Asked        Blood Transfusions: Not Asked        Caffeine Concern: Yes          1-2 diet soda daily, 1 cup of coffee daily        Occupational Exposure: Not Asked        Joanie Tim · Average CHO Intake: 100  · Is patient exercising? yes  · Type of exercise?  Upper body exercises  · Met with PT    Eating Habits  · Patient states the following:  · Eats 3 meal(s) per day  · Length of time it takes to consume a meal:  20  · # of snacks sleep apnea has been stable since the last clinic visit. There has not been any increase in hyper-somnolence.        Encounter Diagnosis(ses):   Stress  (primary encounter diagnosis)  Binge eating  Insulin resistance  Ashu on cpap  Morbid obesity with bmi of

## 2021-11-19 ENCOUNTER — TELEPHONE (OUTPATIENT)
Dept: BARIATRICS/WEIGHT MGMT | Age: 46
End: 2021-11-19

## 2021-11-19 ENCOUNTER — OFFICE VISIT (OUTPATIENT)
Dept: SLEEP MEDICINE | Age: 46
End: 2021-11-19

## 2021-11-19 ENCOUNTER — TELEPHONE (OUTPATIENT)
Dept: SLEEP MEDICINE | Age: 46
End: 2021-11-19

## 2021-11-19 VITALS
BODY MASS INDEX: 57.52 KG/M2 | SYSTOLIC BLOOD PRESSURE: 132 MMHG | WEIGHT: 293 LBS | HEIGHT: 60 IN | HEART RATE: 98 BPM | OXYGEN SATURATION: 98 % | DIASTOLIC BLOOD PRESSURE: 82 MMHG

## 2021-11-19 DIAGNOSIS — R63.5 WEIGHT GAIN: ICD-10-CM

## 2021-11-19 DIAGNOSIS — G47.33 OBSTRUCTIVE SLEEP APNEA (ADULT) (PEDIATRIC): Primary | ICD-10-CM

## 2021-11-19 DIAGNOSIS — G47.33 OSA ON CPAP: ICD-10-CM

## 2021-11-19 DIAGNOSIS — Z98.84 BARIATRIC SURGERY STATUS: ICD-10-CM

## 2021-11-19 PROCEDURE — 99214 OFFICE O/P EST MOD 30 MIN: CPT | Performed by: NURSE PRACTITIONER

## 2021-11-19 ASSESSMENT — ENCOUNTER SYMPTOMS
DIARRHEA: 0
EXCESSIVE DAYTIME SLEEPINESS: 1
SNORING: 0
WEIGHT LOSS: 0
VOMITING: 0
CHILLS: 0
BLURRED VISION: 0
SLEEP DISTURBANCES DUE TO BREATHING: 0
NAUSEA: 0
SORE THROAT: 0
BACK PAIN: 0
PARESTHESIAS: 0
CONSTIPATION: 0
HEADACHES: 0
FEVER: 0
PHOTOPHOBIA: 0
NUMBNESS: 0

## 2021-11-22 ENCOUNTER — OFFICE VISIT (OUTPATIENT)
Dept: BARIATRICS/WEIGHT MGMT | Age: 46
End: 2021-11-22

## 2021-11-22 VITALS
DIASTOLIC BLOOD PRESSURE: 100 MMHG | HEART RATE: 77 BPM | TEMPERATURE: 97.3 F | SYSTOLIC BLOOD PRESSURE: 146 MMHG | WEIGHT: 293 LBS | OXYGEN SATURATION: 98 % | BODY MASS INDEX: 57.52 KG/M2 | HEIGHT: 60 IN

## 2021-11-22 DIAGNOSIS — R63.5 WEIGHT GAIN: Primary | ICD-10-CM

## 2021-11-22 PROCEDURE — 94690 O2 UPTK REST INDIRECT: CPT

## 2021-11-22 ASSESSMENT — PATIENT HEALTH QUESTIONNAIRE - PHQ9
SUM OF ALL RESPONSES TO PHQ9 QUESTIONS 1 AND 2: 0
1. LITTLE INTEREST OR PLEASURE IN DOING THINGS: NOT AT ALL
CLINICAL INTERPRETATION OF PHQ2 SCORE: NO FURTHER SCREENING NEEDED
SUM OF ALL RESPONSES TO PHQ9 QUESTIONS 1 AND 2: 0
2. FEELING DOWN, DEPRESSED OR HOPELESS: NOT AT ALL

## 2021-11-23 ENCOUNTER — OFFICE VISIT (OUTPATIENT)
Dept: OPHTHALMOLOGY | Facility: CLINIC | Age: 46
End: 2021-11-23
Payer: MEDICAID

## 2021-11-23 DIAGNOSIS — H18.523 CORNEAL EPITHELIAL BASEMENT MEMBRANE DYSTROPHY OF BOTH EYES: Primary | ICD-10-CM

## 2021-11-23 DIAGNOSIS — H52.4 HYPEROPIA WITH PRESBYOPIA OF BOTH EYES: ICD-10-CM

## 2021-11-23 DIAGNOSIS — H52.03 HYPEROPIA WITH PRESBYOPIA OF BOTH EYES: ICD-10-CM

## 2021-11-23 PROCEDURE — 92015 DETERMINE REFRACTIVE STATE: CPT | Performed by: OPHTHALMOLOGY

## 2021-11-23 PROCEDURE — 92004 COMPRE OPH EXAM NEW PT 1/>: CPT | Performed by: OPHTHALMOLOGY

## 2021-11-23 NOTE — PROGRESS NOTES
Samuel Ferraro is a 55year old female. HPI:     HPI     Pt was last seen in 2018 by LAILA. Pt denies any blurred vision at distance or near with her OTC glasses. Pt is not using any eye drops and has no ocular complaints.      Last edited by Sumi Lau Tab TAKE 1 TABLET(10 MG) BY MOUTH EVERY NIGHT AS NEEDED FOR MUSCLE SPASMS 30 tablet 1   • Fremanezumab-vfrm (AJOVY) 225 MG/1.5ML Subcutaneous Solution Prefilled Syringe Inject 225 mg into the skin every 28 days.  1 each 3   • venlafaxine 75 MG Oral Capsule Full          Extraocular Movement       Right Left     Full, Ortho Full, Ortho          Dilation     Both eyes: 1.0% Mydriacyl and 2.5% Enoch Synephrine @ 4:30 PM            Slit Lamp and Fundus Exam     Slit Lamp Exam       Right Left    Lids/Lashes Dermat

## 2021-11-23 NOTE — PATIENT INSTRUCTIONS
Hyperopia with presbyopia of both eyes  New glasses today; recommend bifocals for best corrected vision at distance and near. Map-dot-fingerprint corneal dystrophy  No treatment.

## 2021-11-29 ENCOUNTER — APPOINTMENT (OUTPATIENT)
Dept: PHYSICAL THERAPY | Age: 46
End: 2021-11-29
Attending: PHYSICAL MEDICINE & REHABILITATION
Payer: MEDICAID

## 2021-11-29 ENCOUNTER — TELEPHONE (OUTPATIENT)
Dept: PHYSICAL THERAPY | Facility: HOSPITAL | Age: 46
End: 2021-11-29

## 2021-11-30 ENCOUNTER — OFFICE VISIT (OUTPATIENT)
Dept: PHYSICAL MEDICINE AND REHAB | Facility: CLINIC | Age: 46
End: 2021-11-30
Payer: MEDICAID

## 2021-11-30 VITALS — WEIGHT: 293 LBS | OXYGEN SATURATION: 97 % | BODY MASS INDEX: 57.52 KG/M2 | HEIGHT: 60 IN | HEART RATE: 87 BPM

## 2021-11-30 DIAGNOSIS — G25.89 SCAPULAR DYSKINESIS: ICD-10-CM

## 2021-11-30 DIAGNOSIS — M75.40 SUBACROMIAL IMPINGEMENT, UNSPECIFIED LATERALITY: ICD-10-CM

## 2021-11-30 DIAGNOSIS — E66.01 CLASS 3 SEVERE OBESITY DUE TO EXCESS CALORIES WITH SERIOUS COMORBIDITY AND BODY MASS INDEX (BMI) GREATER THAN OR EQUAL TO 70 IN ADULT (HCC): ICD-10-CM

## 2021-11-30 DIAGNOSIS — M54.2 TRIGGER POINT OF NECK: ICD-10-CM

## 2021-11-30 DIAGNOSIS — M79.10 MYALGIA: ICD-10-CM

## 2021-11-30 DIAGNOSIS — M25.511 CHRONIC PAIN OF BOTH SHOULDERS: ICD-10-CM

## 2021-11-30 DIAGNOSIS — M99.9 BIOMECHANICAL LESION: ICD-10-CM

## 2021-11-30 DIAGNOSIS — M25.512 CHRONIC PAIN OF BOTH SHOULDERS: ICD-10-CM

## 2021-11-30 DIAGNOSIS — M67.919 TENDINOPATHY OF ROTATOR CUFF, UNSPECIFIED LATERALITY: ICD-10-CM

## 2021-11-30 DIAGNOSIS — G89.29 CHRONIC PAIN OF BOTH SHOULDERS: ICD-10-CM

## 2021-11-30 PROCEDURE — 99214 OFFICE O/P EST MOD 30 MIN: CPT | Performed by: PHYSICAL MEDICINE & REHABILITATION

## 2021-11-30 PROCEDURE — 3008F BODY MASS INDEX DOCD: CPT | Performed by: PHYSICAL MEDICINE & REHABILITATION

## 2021-11-30 RX ORDER — NAPROXEN 500 MG/1
500 TABLET ORAL 2 TIMES DAILY WITH MEALS
Qty: 60 TABLET | Refills: 0 | Status: SHIPPED | OUTPATIENT
Start: 2021-11-30

## 2021-11-30 NOTE — PATIENT INSTRUCTIONS
1) Start the therapy which is on track to begin tomorrow with [facility] in [place]  2) Continue Naprosyn only as needed but no more than twice per day  3) We can always perform the shoulder injections (Subacromial under ultrasound guidance) if needed but woul

## 2021-11-30 NOTE — PROGRESS NOTES
[address]  Progress Note    CHIEF COMPLAINT:  Patient presents with:  Neck Pain: LOV: 10/13/21 Patient f/u on bilateral neck pain that radiates to shoulders, with N/T in fingers. Denies starting PT.   XR Ce Cigarettes        Quit date: 2013        Years since quittin.9      Smokeless tobacco: Never Used    Vaping Use      Vaping Use: Never used    Substance and Sexual Activity      Alcohol use: Not Currently        Alcohol/week: 0.0 standard drinks Oral Tab Take 1 tablet by mouth daily. • Minoxidil 2 % External Solution Apply topically 2 (two) times daily. ALLERGIES:     Sulfa Antibiotics       RASH    REVIEW OF SYSTEMS:   Review of Systems   Constitutional: Negative. HENT: Negative. the upper extremities  Reflexes: 2/4 at C5, C6, C7  Neer's test: Positive  Hawkin's test: Positive  Cross Arm Test: negative for [facility] joint pain  Speed's Test: Positive bilaterally  Yergason Test: negative for biceps tendon pain  Empty Can Test: Positive leodan 07/15/2021 32.0  31.0 - 37.0 g/dL Final   • RDW-SD 07/15/2021 41.3  35.1 - 46.3 fL Final   • RDW 07/15/2021 12.8  11.0 - 15.0 % Final   • PLT 07/15/2021 281.0  150.0 - 450.0 10(3)uL Final   • Neutrophil Absolute Prelim 07/15/2021 3.36  1.50 - 7.70 x10 (3) Final   • D-Dimer 06/18/2021 0.73* <0.50 ug/mL FEU Final   • WBC 06/18/2021 9.0  4.0 - 11.0 x10(3) uL Final   • RBC 06/18/2021 5.30  3.80 - 5.30 x10(6)uL Final   • HGB 06/18/2021 14.6  12.0 - 16.0 g/dL Final   • HCT 06/18/2021 45.6  35.0 - 48.0 % Final   • vertebral body height.  No acute fracture or suspicious bone lesion. DISC SPACES:   Slight loss of disc height at C5-6.    PREVERTEBRAL SOFT TISSUES: Negative.     FLEXION/EXTENSION: Normal range of motion.  No subluxation during flexion and extension.   ASSESSMENT AND PLAN:  La Forrest is a pleasant 59-year-old female presents for follow-up of her bilateral shoulder pain due to rotator cuff tendinopathy, impingement syndrome, and subacromial/subdeltoid bursitis due to biomechanical insufficiencies.

## 2021-12-01 ENCOUNTER — OFFICE VISIT (OUTPATIENT)
Dept: PHYSICAL THERAPY | Age: 46
End: 2021-12-01
Attending: PHYSICAL MEDICINE & REHABILITATION
Payer: MEDICAID

## 2021-12-01 ENCOUNTER — TELEPHONE (OUTPATIENT)
Dept: PULMONOLOGY | Facility: CLINIC | Age: 46
End: 2021-12-01

## 2021-12-01 DIAGNOSIS — M25.512 CHRONIC PAIN OF BOTH SHOULDERS: ICD-10-CM

## 2021-12-01 DIAGNOSIS — M75.40 SUBACROMIAL IMPINGEMENT, UNSPECIFIED LATERALITY: ICD-10-CM

## 2021-12-01 DIAGNOSIS — G89.29 CHRONIC PAIN OF BOTH SHOULDERS: ICD-10-CM

## 2021-12-01 DIAGNOSIS — M99.9 BIOMECHANICAL LESION: ICD-10-CM

## 2021-12-01 DIAGNOSIS — E66.01 CLASS 3 SEVERE OBESITY DUE TO EXCESS CALORIES WITH SERIOUS COMORBIDITY AND BODY MASS INDEX (BMI) GREATER THAN OR EQUAL TO 70 IN ADULT (HCC): ICD-10-CM

## 2021-12-01 DIAGNOSIS — M67.919 TENDINOPATHY OF ROTATOR CUFF, UNSPECIFIED LATERALITY: ICD-10-CM

## 2021-12-01 DIAGNOSIS — M79.10 MYALGIA: ICD-10-CM

## 2021-12-01 DIAGNOSIS — M54.2 TRIGGER POINT OF NECK: ICD-10-CM

## 2021-12-01 DIAGNOSIS — G25.89 SCAPULAR DYSKINESIS: ICD-10-CM

## 2021-12-01 DIAGNOSIS — M25.511 CHRONIC PAIN OF BOTH SHOULDERS: ICD-10-CM

## 2021-12-01 PROCEDURE — 97161 PT EVAL LOW COMPLEX 20 MIN: CPT

## 2021-12-01 PROCEDURE — 97110 THERAPEUTIC EXERCISES: CPT

## 2021-12-01 NOTE — PROGRESS NOTES
UPPER EXTREMITY EVALUATION:   Referring Physician: Dr. Sujatha Morgan  Diagnosis: Tendinopathy of rotator cuff, unspecified laterality (M67.919)  Chronic pain of both shoulders (M25.511,G89.29,M25.512)  Myalgia (M79.10) Date of Service: 12/1/2021     PATIENT SUM goals include: better movement, less pain, . Past medical history was reviewed with Carolina Cook.  Significant findings include:   Past Medical History:   Diagnosis Date   • Anxiety    • Chicken pox    • Depression    • Extrinsic asthma, unspecified    • H/O correctly without reported pain. Skilled Physical Therapy is medically necessary to address the above impairments and reach functional goals.   Precautions: None     OBJECTIVE:   Observation/Posture: fwd head, rounded shoulders, abducted scapulae    Cervica min     Total Treatment Time: 40 min     Based on clinical rationale and outcome measures, this evaluation involved Low Complexity decision making. PLAN OF CARE:    Goals: To be met in 8-10 sessions  1.  Pt will verbalize and demo compliance with HE

## 2021-12-02 NOTE — TELEPHONE ENCOUNTER
Data download is excellent with average daily usage of 8 hours and 14 minutes with residual respiratory events of 3/h. However, she is on CPAP 13.5 CWP. Please ensure she is tolerating this pressure as we had previously requested for pressure to be changed from 6 to 8 CWP.

## 2021-12-06 ENCOUNTER — APPOINTMENT (OUTPATIENT)
Dept: PHYSICAL THERAPY | Age: 46
End: 2021-12-06
Attending: PHYSICAL MEDICINE & REHABILITATION
Payer: MEDICAID

## 2021-12-08 ENCOUNTER — APPOINTMENT (OUTPATIENT)
Dept: PHYSICAL THERAPY | Age: 46
End: 2021-12-08
Attending: PHYSICAL MEDICINE & REHABILITATION
Payer: MEDICAID

## 2021-12-10 ENCOUNTER — HOSPITAL ENCOUNTER (OUTPATIENT)
Age: 46
Discharge: HOME | End: 2021-12-10
Attending: EMERGENCY MEDICINE
Payer: MEDICAID

## 2021-12-10 ENCOUNTER — TELEPHONE (OUTPATIENT)
Dept: GASTROENTEROLOGY | Facility: CLINIC | Age: 46
End: 2021-12-10

## 2021-12-10 ENCOUNTER — TELEPHONE (OUTPATIENT)
Dept: BARIATRICS/WEIGHT MGMT | Age: 46
End: 2021-12-10

## 2021-12-10 VITALS
RESPIRATION RATE: 18 BRPM | OXYGEN SATURATION: 98 % | TEMPERATURE: 98 F | DIASTOLIC BLOOD PRESSURE: 84 MMHG | HEART RATE: 69 BPM | SYSTOLIC BLOOD PRESSURE: 150 MMHG

## 2021-12-10 DIAGNOSIS — K20.90 ESOPHAGITIS: Primary | ICD-10-CM

## 2021-12-10 DIAGNOSIS — J06.9 UPPER RESPIRATORY TRACT INFECTION, UNSPECIFIED TYPE: Primary | ICD-10-CM

## 2021-12-10 PROCEDURE — 99213 OFFICE O/P EST LOW 20 MIN: CPT

## 2021-12-10 PROCEDURE — 87880 STREP A ASSAY W/OPTIC: CPT

## 2021-12-10 NOTE — TELEPHONE ENCOUNTER
Rescheduled for:  EGD/BRAVO - 48015  Provider Name:  Dr. Judith Martin  Date:  FROM - 12/13/21                       TO - 2/18/22  Location:  [facility]  Sedation:  MAC  Time:  FROM - 9:30 am                       TO - 10:00 am (pt is aware to arrive at 9:00 am)  Prep:

## 2021-12-13 ENCOUNTER — TELEPHONE (OUTPATIENT)
Dept: GASTROENTEROLOGY | Facility: CLINIC | Age: 46
End: 2021-12-13

## 2021-12-13 ENCOUNTER — APPOINTMENT (OUTPATIENT)
Dept: PHYSICAL THERAPY | Age: 46
End: 2021-12-13
Payer: MEDICAID

## 2021-12-13 NOTE — TELEPHONE ENCOUNTER
Per Endo PAT/RN--    Patient called stated her son tested positive on 12/4. Son lives with patient. Per Infection control, patient needs to reschedule due to continuous exposure, patients quarantine should start on 12/12.  Patient notified and transferred t

## 2021-12-14 ENCOUNTER — TELEPHONE (OUTPATIENT)
Dept: PHYSICAL THERAPY | Age: 46
End: 2021-12-14

## 2021-12-15 ENCOUNTER — APPOINTMENT (OUTPATIENT)
Dept: PHYSICAL THERAPY | Age: 46
End: 2021-12-15
Attending: PHYSICAL MEDICINE & REHABILITATION
Payer: MEDICAID

## 2021-12-15 ENCOUNTER — DOCUMENTATION (OUTPATIENT)
Dept: BARIATRICS/WEIGHT MGMT | Age: 46
End: 2021-12-15

## 2021-12-20 ENCOUNTER — APPOINTMENT (OUTPATIENT)
Dept: PHYSICAL THERAPY | Age: 46
End: 2021-12-20
Attending: PHYSICAL MEDICINE & REHABILITATION
Payer: MEDICAID

## 2021-12-20 NOTE — TELEPHONE ENCOUNTER
GI Schedulers--    Patient sent [employer]t requesting sooner procedure date given trying to have bariatric surgery. Attached Dr. Fiona Michele response below. Please assist with accommodating sooner date if available to add in. Thank you!      Pierce Epley

## 2021-12-20 NOTE — TELEPHONE ENCOUNTER
Rescheduled for:  EGD/BRAVO - [id number]  Provider Name:  Dr. Shmuel Bernard  Date:  FROM - 2/18/22                          TO - 1/21/22  Location:  [facility]  Sedation:  MAC  Time:  FROM - 10:00 am                      TO - 11:15 am (pt is aware to arrive at 10:15 am)  Prep

## 2021-12-22 ENCOUNTER — OFFICE VISIT (OUTPATIENT)
Dept: PHYSICAL THERAPY | Age: 46
End: 2021-12-22
Attending: PHYSICAL MEDICINE & REHABILITATION
Payer: MEDICAID

## 2021-12-22 PROCEDURE — 97110 THERAPEUTIC EXERCISES: CPT

## 2021-12-22 PROCEDURE — 97140 MANUAL THERAPY 1/> REGIONS: CPT

## 2021-12-22 RX ORDER — FREMANEZUMAB-VFRM 225 MG/1.5ML
INJECTION SUBCUTANEOUS
Qty: 1.5 ML | Refills: 3 | Status: SHIPPED | OUTPATIENT
Start: 2021-12-22

## 2021-12-22 NOTE — TELEPHONE ENCOUNTER
Refill request for ajovy 225 mg/1.5 mL, inject 225 mg every 28 days, 1 pen, 3 refills    LOV: 8/13/21  NOV: None  Last refilled on 9/1/21 for 1 pen with 3 refills

## 2021-12-22 NOTE — PROGRESS NOTES
Diagnosis: Tendinopathy of rotator cuff, unspecified laterality (M67.919)  Chronic pain of both shoulders (M25.511,G89.29,M25.512)  Myalgia (M79.10)  Insurance (Authorized # of Visits):  Mike Russell (8 visits exp 1/30)  Authorizing Physician: Dr. Ritu Medellin MD

## 2021-12-23 ENCOUNTER — V-VISIT (OUTPATIENT)
Dept: BARIATRICS/WEIGHT MGMT | Age: 46
End: 2021-12-23

## 2021-12-23 DIAGNOSIS — K21.9 GASTROESOPHAGEAL REFLUX DISEASE, UNSPECIFIED WHETHER ESOPHAGITIS PRESENT: ICD-10-CM

## 2021-12-23 DIAGNOSIS — E66.01 MORBID OBESITY WITH BODY MASS INDEX OF 60.0-69.9 IN ADULT (CMD): Primary | ICD-10-CM

## 2021-12-23 DIAGNOSIS — G43.909 MIGRAINE WITHOUT STATUS MIGRAINOSUS, NOT INTRACTABLE, UNSPECIFIED MIGRAINE TYPE: ICD-10-CM

## 2021-12-23 DIAGNOSIS — E88.810 INSULIN RESISTANCE SYNDROME: ICD-10-CM

## 2021-12-23 DIAGNOSIS — G47.33 OSA ON CPAP: ICD-10-CM

## 2021-12-23 PROCEDURE — 99214 OFFICE O/P EST MOD 30 MIN: CPT | Performed by: NURSE PRACTITIONER

## 2021-12-23 ASSESSMENT — ENCOUNTER SYMPTOMS
NAUSEA: 0
ABDOMINAL DISTENTION: 0
APNEA: 1
NEUROLOGICAL NEGATIVE: 1
ANAL BLEEDING: 0
FEVER: 0
SHORTNESS OF BREATH: 0
COUGH: 0
RECTAL PAIN: 0
VOMITING: 0
PSYCHIATRIC NEGATIVE: 1
ENDOCRINE NEGATIVE: 1
SINUS PRESSURE: 0
BLOOD IN STOOL: 0
EYES NEGATIVE: 1
FATIGUE: 0
CHOKING: 0
CHEST TIGHTNESS: 0
DIARRHEA: 0
CHILLS: 0
ABDOMINAL PAIN: 0
CONSTIPATION: 0

## 2021-12-23 ASSESSMENT — PATIENT HEALTH QUESTIONNAIRE - PHQ9
1. LITTLE INTEREST OR PLEASURE IN DOING THINGS: NOT AT ALL
SUM OF ALL RESPONSES TO PHQ9 QUESTIONS 1 AND 2: 0
SUM OF ALL RESPONSES TO PHQ9 QUESTIONS 1 AND 2: 0
CLINICAL INTERPRETATION OF PHQ2 SCORE: NO FURTHER SCREENING NEEDED
2. FEELING DOWN, DEPRESSED OR HOPELESS: NOT AT ALL

## 2021-12-24 NOTE — ED PROVIDER NOTES
Patient Seen in: [facility] [place]      History   Patient presents with:  Cough/URI    Stated Complaint: congestion    Subjective:   HPI    56 yo female with cough, congestion and sore throat for 3 days. No fever. Recent covid exposure.      Objectiv Mouth/Throat:      Mouth: Mucous membranes are moist.      Pharynx: Oropharynx is clear. Eyes:      Conjunctiva/sclera: Conjunctivae normal.      Pupils: Pupils are equal, round, and reactive to light.    Cardiovascular:      Rate and Rhythm: Normal rate

## 2022-01-07 ENCOUNTER — OFFICE VISIT (OUTPATIENT)
Dept: PHYSICAL THERAPY | Age: 47
End: 2022-01-07
Attending: PHYSICAL MEDICINE & REHABILITATION
Payer: MEDICAID

## 2022-01-07 PROCEDURE — 97110 THERAPEUTIC EXERCISES: CPT

## 2022-01-07 PROCEDURE — 97140 MANUAL THERAPY 1/> REGIONS: CPT

## 2022-01-07 NOTE — PROGRESS NOTES
Diagnosis: Tendinopathy of rotator cuff, unspecified laterality (M67.919)  Chronic pain of both shoulders (M25.511,G89.29,M25.512)  Myalgia (M79.10)  Insurance (Authorized # of Visits):  Mike Russell (8 visits exp 1/30)  Authorizing Physician: Dr. Hayden Medellin MD [id number]     Manual Therapy Seated: TP release B UTs/levators and L post RTC Seated: STM/TP release B UTs, supraspinatus, levators    Neuromuscular Re-education        Current HEP:   12/1: Patient was instructed in and issued a HEP for: wall slides flexion

## 2022-01-11 ENCOUNTER — OFFICE VISIT (OUTPATIENT)
Dept: PHYSICAL MEDICINE AND REHAB | Facility: CLINIC | Age: 47
End: 2022-01-11
Payer: MEDICAID

## 2022-01-11 VITALS — HEART RATE: 83 BPM | WEIGHT: 293 LBS | OXYGEN SATURATION: 100 % | BODY MASS INDEX: 62 KG/M2

## 2022-01-11 DIAGNOSIS — M67.919 TENDINOPATHY OF ROTATOR CUFF, UNSPECIFIED LATERALITY: Primary | ICD-10-CM

## 2022-01-11 DIAGNOSIS — M25.511 CHRONIC PAIN OF BOTH SHOULDERS: ICD-10-CM

## 2022-01-11 DIAGNOSIS — M75.40 SUBACROMIAL IMPINGEMENT, UNSPECIFIED LATERALITY: ICD-10-CM

## 2022-01-11 DIAGNOSIS — G25.89 SCAPULAR DYSKINESIS: ICD-10-CM

## 2022-01-11 DIAGNOSIS — M79.10 MYALGIA: ICD-10-CM

## 2022-01-11 DIAGNOSIS — E66.01 CLASS 3 SEVERE OBESITY DUE TO EXCESS CALORIES WITH SERIOUS COMORBIDITY AND BODY MASS INDEX (BMI) GREATER THAN OR EQUAL TO 70 IN ADULT (HCC): ICD-10-CM

## 2022-01-11 DIAGNOSIS — M54.2 TRIGGER POINT OF NECK: ICD-10-CM

## 2022-01-11 DIAGNOSIS — G89.29 CHRONIC PAIN OF BOTH SHOULDERS: ICD-10-CM

## 2022-01-11 DIAGNOSIS — M25.512 CHRONIC PAIN OF BOTH SHOULDERS: ICD-10-CM

## 2022-01-11 DIAGNOSIS — M99.9 BIOMECHANICAL LESION: ICD-10-CM

## 2022-01-11 PROCEDURE — 99214 OFFICE O/P EST MOD 30 MIN: CPT | Performed by: PHYSICAL MEDICINE & REHABILITATION

## 2022-01-11 NOTE — PROGRESS NOTES
[address]  Progress Note    CHIEF COMPLAINT:  Patient presents with:  PT Follow-Up: LOV 11/30/21. She is still in PT and has more ROM. States 25% improvement. No more pain, just soreness. Pain 0/10.  Naproxe 2013        Years since quittin.0      Smokeless tobacco: Never Used    Vaping Use      Vaping Use: Never used    Substance and Sexual Activity      Alcohol use: Not Currently        Alcohol/week: 0.0 standard drinks        Comment: Not frequently ALLERGIES:     Sulfa Antibiotics       RASH    REVIEW OF SYSTEMS:   Review of Systems   Constitutional: Negative. HENT: Negative. Eyes: Negative. Respiratory: Negative. Cardiovascular: Negative. Gastrointestinal: Negative.     Lilly Jalloh 07/15/2021 Auto Resulted   Final   • Glucose 07/15/2021 127* 70 - 99 mg/dL Final   • Sodium 07/15/2021 139  136 - 145 mmol/L Final   • Potassium 07/15/2021 3.8  3.5 - 5.1 mmol/L Final   • Chloride 07/15/2021 104  98 - 112 mmol/L Final   • CO2 07/15/2021 30 Final   • Basophil Absolute 07/15/2021 0.03  0.00 - 0.20 x10(3) uL Final   • Immature Granulocyte Absolute 07/15/2021 0.01  0.00 - 1.00 x10(3) uL Final   • Neutrophil % 07/15/2021 61.6  % Final   • Lymphocyte % 07/15/2021 26.1  % Final   • Monocyte % 07/15 TISSUES: No visible soft tissue swelling. EFFUSION: None visible.    OTHER: Negative.                   Impression   CONCLUSION: No acute osseous abnormality of the left shoulder.           Dictated by (CST): Jose Walker MD on 10/13/2021 at 2:46 PM     of the bilateral upper extremities. I will follow-up with Jered Grandchild in about 6 to 8 weeks. RTC in 6 to 8 weeks  Discharge Instructions were provided as documented in AVS summary. The patient was in agreement with the assessment and plan.   All questio

## 2022-01-11 NOTE — PATIENT INSTRUCTIONS
1) continue with physical therapy. You may need more than 8 sessions. 2) Continue Naprosyn 1-2 times per day  3) Start using resting wrist splints (Carpal tunnel splints).  Wear these at night while sleeping  4) Continue weight loss program  5) Follow up

## 2022-01-12 ENCOUNTER — OFFICE VISIT (OUTPATIENT)
Dept: PHYSICAL THERAPY | Age: 47
End: 2022-01-12
Attending: PHYSICAL MEDICINE & REHABILITATION
Payer: MEDICAID

## 2022-01-12 PROCEDURE — 97110 THERAPEUTIC EXERCISES: CPT

## 2022-01-12 PROCEDURE — 97140 MANUAL THERAPY 1/> REGIONS: CPT

## 2022-01-12 NOTE — PROGRESS NOTES
Diagnosis: Tendinopathy of rotator cuff, unspecified laterality (M67.919)  Chronic pain of both shoulders (M25.511,G89.29,M25.512)  Myalgia (M79.10)  Insurance (Authorized # of Visits):  Mike Russell (8 visits exp 1/30)  Authorizing Physician: Dr. Nisa Medellin MD Seated: STM/TP release B UTs, supraspinatus, levators Seated: STM/TP release and theragun to B UTs, supraspinatus, levators focus distally   Neuromuscular Re-education        Current HEP:   12/1: Patient was instructed in and issued a HEP for: wall slides

## 2022-01-14 ENCOUNTER — APPOINTMENT (OUTPATIENT)
Dept: PHYSICAL THERAPY | Age: 47
End: 2022-01-14
Attending: PHYSICAL MEDICINE & REHABILITATION
Payer: MEDICAID

## 2022-01-14 ENCOUNTER — TELEPHONE (OUTPATIENT)
Dept: PHYSICAL THERAPY | Facility: HOSPITAL | Age: 47
End: 2022-01-14

## 2022-01-17 ENCOUNTER — V-VISIT (OUTPATIENT)
Dept: BARIATRICS/WEIGHT MGMT | Age: 47
End: 2022-01-17

## 2022-01-17 DIAGNOSIS — G43.909 MIGRAINE WITHOUT STATUS MIGRAINOSUS, NOT INTRACTABLE, UNSPECIFIED MIGRAINE TYPE: ICD-10-CM

## 2022-01-17 DIAGNOSIS — K21.9 GASTROESOPHAGEAL REFLUX DISEASE, UNSPECIFIED WHETHER ESOPHAGITIS PRESENT: ICD-10-CM

## 2022-01-17 DIAGNOSIS — R63.2 BINGE EATING: ICD-10-CM

## 2022-01-17 DIAGNOSIS — E88.810 INSULIN RESISTANCE SYNDROME: Primary | ICD-10-CM

## 2022-01-17 DIAGNOSIS — R60.0 LOWER EXTREMITY EDEMA: ICD-10-CM

## 2022-01-17 DIAGNOSIS — G47.33 OSA ON CPAP: ICD-10-CM

## 2022-01-17 DIAGNOSIS — E66.01 MORBID OBESITY WITH BODY MASS INDEX OF 60.0-69.9 IN ADULT (CMD): ICD-10-CM

## 2022-01-17 PROCEDURE — 99214 OFFICE O/P EST MOD 30 MIN: CPT | Performed by: SURGERY

## 2022-01-17 ASSESSMENT — PATIENT HEALTH QUESTIONNAIRE - PHQ9
1. LITTLE INTEREST OR PLEASURE IN DOING THINGS: NOT AT ALL
SUM OF ALL RESPONSES TO PHQ9 QUESTIONS 1 AND 2: 0
2. FEELING DOWN, DEPRESSED OR HOPELESS: NOT AT ALL
SUM OF ALL RESPONSES TO PHQ9 QUESTIONS 1 AND 2: 0
CLINICAL INTERPRETATION OF PHQ2 SCORE: NO FURTHER SCREENING NEEDED

## 2022-01-18 ENCOUNTER — OFFICE VISIT (OUTPATIENT)
Dept: PHYSICAL THERAPY | Age: 47
End: 2022-01-18
Attending: PHYSICAL MEDICINE & REHABILITATION
Payer: MEDICAID

## 2022-01-18 ENCOUNTER — LAB ENCOUNTER (OUTPATIENT)
Dept: LAB | Facility: HOSPITAL | Age: 47
End: 2022-01-18
Attending: INTERNAL MEDICINE
Payer: MEDICAID

## 2022-01-18 DIAGNOSIS — Z01.812 ENCOUNTER FOR PREOPERATIVE SCREENING LABORATORY TESTING FOR COVID-19 VIRUS: ICD-10-CM

## 2022-01-18 DIAGNOSIS — Z20.822 ENCOUNTER FOR PREOPERATIVE SCREENING LABORATORY TESTING FOR COVID-19 VIRUS: ICD-10-CM

## 2022-01-18 PROCEDURE — 97110 THERAPEUTIC EXERCISES: CPT

## 2022-01-18 PROCEDURE — 97140 MANUAL THERAPY 1/> REGIONS: CPT

## 2022-01-18 NOTE — PROGRESS NOTES
Diagnosis: Tendinopathy of rotator cuff, unspecified laterality (M67.919)  Chronic pain of both shoulders (M25.511,G89.29,M25.512)  Myalgia (M79.10)  Insurance (Authorized # of Visits):  Mike Russell (8 visits exp 1/30)  Authorizing Physician: Dr. Claudine Medellin MD thoracic extension over chair 10x      Standing PEMA GTB 20x    Standing rows BTB 20x    Standing wall angels sustained hold [id number]    Shoulder flexion back to wall focus on elimination of cervical compensation 10x R/L   Manual Therapy Seated: TP release

## 2022-01-19 LAB — SARS-COV-2 RNA RESP QL NAA+PROBE: NOT DETECTED

## 2022-01-20 ENCOUNTER — OFFICE VISIT (OUTPATIENT)
Dept: PHYSICAL THERAPY | Age: 47
End: 2022-01-20
Attending: PHYSICAL MEDICINE & REHABILITATION
Payer: MEDICAID

## 2022-01-20 PROCEDURE — 97140 MANUAL THERAPY 1/> REGIONS: CPT

## 2022-01-20 PROCEDURE — 97110 THERAPEUTIC EXERCISES: CPT

## 2022-01-20 NOTE — PROGRESS NOTES
Diagnosis: Tendinopathy of rotator cuff, unspecified laterality (M67.919)  Chronic pain of both shoulders (M25.511,G89.29,M25.512)  Myalgia (M79.10)  Insurance (Authorized # of Visits):  Mike Russell (8 visits exp 1/30)  Authorizing Physician: Dr. Zaria Medellin MD 20x    Standing rows BTB 20x    Standing wall angels sustained hold [id number]    Shoulder flexion back to wall focus on elimination of cervical compensation 10x R/L   Manual Therapy Seated: STM/TP release and theragun to B UTs/supraspinatus focus distally S

## 2022-01-21 ENCOUNTER — ANESTHESIA (OUTPATIENT)
Dept: ENDOSCOPY | Facility: HOSPITAL | Age: 47
End: 2022-01-21
Payer: MEDICAID

## 2022-01-21 ENCOUNTER — ANESTHESIA EVENT (OUTPATIENT)
Dept: ENDOSCOPY | Facility: HOSPITAL | Age: 47
End: 2022-01-21
Payer: MEDICAID

## 2022-01-21 ENCOUNTER — HOSPITAL ENCOUNTER (OUTPATIENT)
Facility: HOSPITAL | Age: 47
Setting detail: HOSPITAL OUTPATIENT SURGERY
Discharge: HOME | End: 2022-01-21
Attending: INTERNAL MEDICINE | Admitting: INTERNAL MEDICINE
Payer: MEDICAID

## 2022-01-21 DIAGNOSIS — Z20.822 ENCOUNTER FOR PREOPERATIVE SCREENING LABORATORY TESTING FOR COVID-19 VIRUS: Primary | ICD-10-CM

## 2022-01-21 DIAGNOSIS — Z01.812 ENCOUNTER FOR PREOPERATIVE SCREENING LABORATORY TESTING FOR COVID-19 VIRUS: Primary | ICD-10-CM

## 2022-01-21 DIAGNOSIS — K20.90 ESOPHAGITIS: ICD-10-CM

## 2022-01-21 LAB — B-HCG UR QL: NEGATIVE

## 2022-01-21 PROCEDURE — 43239 EGD BIOPSY SINGLE/MULTIPLE: CPT | Performed by: INTERNAL MEDICINE

## 2022-01-21 PROCEDURE — 0DB68ZX EXCISION OF STOMACH, VIA NATURAL OR ARTIFICIAL OPENING ENDOSCOPIC, DIAGNOSTIC: ICD-10-PCS | Performed by: INTERNAL MEDICINE

## 2022-01-21 RX ORDER — SODIUM CHLORIDE, SODIUM LACTATE, POTASSIUM CHLORIDE, CALCIUM CHLORIDE 600; 310; 30; 20 MG/100ML; MG/100ML; MG/100ML; MG/100ML
INJECTION, SOLUTION INTRAVENOUS CONTINUOUS
Status: DISCONTINUED | OUTPATIENT
Start: 2022-01-21 | End: 2022-01-21

## 2022-01-21 NOTE — ANESTHESIA POSTPROCEDURE EVALUATION
Patient: Catalina Gracia    Procedure Summary     Date: 01/21/22 Room / Location: [address] [place] [address] ENDOSCOPY 04 / [address] [place] [address] ENDOSCOPY    Anesthesia Start: 4447 Anesthesia Stop: 1739    Procedure: 80 HOUR BRAVO ESOPHAGOGASTRODUODENOSCOPY (N/A ) Diagnosis:       Esophagitis

## 2022-01-21 NOTE — ANESTHESIA PREPROCEDURE EVALUATION
Anesthesia PreOp Note    HPI:     Marilyn Queen is a 55year old female who presents for preoperative consultation requested by: Flynn Wilson MD    Date of Surgery: 1/21/2022    Procedure(s):  80 HOUR BRAVO ESOPHAGOGASTRODUODENOSCOPY  Indication: Zuhair Fraction Rfl: 0  TURMERIC OR, Take by mouth., Disp: , Rfl:   venlafaxine 75 MG Oral Capsule SR 24 Hr, Take 1 capsule (75 mg total) by mouth daily. , Disp: 90 capsule, Rfl: 1  Almotriptan Malate 12.5 MG Oral Tab, use at onset; may repeat once after 4 hours- ONLY 2 IN 2013        Years since quittin.0      Smokeless tobacco: Never Used    Vaping Use      Vaping Use: Never used    Substance and Sexual Activity      Alcohol use: Not Currently        Alcohol/week: 0.0 standard drinks        Comment: Not frequently normal exam     Pulmonary    (+) asthma, sleep apnea,   Cardiovascular     Rhythm: regular    Neuro/Psych    (+)  anxiety/panic attacks,  depression,       GI/Hepatic/Renal    (+) GERD,     Endo/Other    Abdominal   (+) obese,                Anesthesia Addie

## 2022-01-21 NOTE — OPERATIVE REPORT
ESOPHAGOGASTRODUODENOSCOPY (EGD) REPORT    Rafia Daniels    GILSON 1975 Age 55year old   PCP Es Storey MD Endoscopist Brando Monzon MD       Date of procedure: 22    Procedure: EGD w/ bravo placement and biopsies    Pre-operative diagnosis cm    Impression:  1. NSAID related erosions, r/o H pylori    Recommend:  1. Successful BRAVO placement  2. Avoid NSAIDs  3. No fruit juices, no peanut butter. 4. Bring back the recorder after 96 hours to our office for analysis. 5.  No MRI (magnetic reso

## 2022-01-21 NOTE — H&P
Pre Procedure History & Physical Examination    Patient Name: Jesu Mendieta  MRN: [id number]  CSN: [id number]  YOB: 1975    Diagnosis: EGD with bravo for acid reflux    AJOVY 225 MG/1.5ML Subcutaneous Solution Prefilled Syringe, INJECT 225 • Morbid obesity with BMI of 50.0-59.9, adult ([facility] [address]    • ANJELICA on CPAP    • Ovarian cyst    • Sleep apnea    • Varicose vein      Past Surgical History:   Procedure Laterality Date   •      • CYST ASPIRATION RIGHT     • MYOMECTOMY 5/> INTR warm and well perfused   Lung: Moves air well;  No labored breathing  Abdomen: soft, non-tender exam in all quadrants without rigidity or guarding, non-distended, no abnormal bowel sounds noted, no masses are palpated  Skin: No jaundice  Ext: no cyanosis, c

## 2022-01-22 VITALS
DIASTOLIC BLOOD PRESSURE: 91 MMHG | HEART RATE: 77 BPM | BODY MASS INDEX: 57.52 KG/M2 | WEIGHT: 293 LBS | SYSTOLIC BLOOD PRESSURE: 139 MMHG | RESPIRATION RATE: 22 BRPM | HEIGHT: 60 IN | OXYGEN SATURATION: 98 %

## 2022-01-24 ENCOUNTER — OFFICE VISIT (OUTPATIENT)
Dept: PHYSICAL THERAPY | Age: 47
End: 2022-01-24
Attending: PHYSICAL MEDICINE & REHABILITATION
Payer: MEDICAID

## 2022-01-24 PROCEDURE — 97140 MANUAL THERAPY 1/> REGIONS: CPT

## 2022-01-24 PROCEDURE — 97110 THERAPEUTIC EXERCISES: CPT

## 2022-01-24 NOTE — PROGRESS NOTES
Diagnosis: Tendinopathy of rotator cuff, unspecified laterality (M67.919)  Chronic pain of both shoulders (M25.511,G89.29,M25.512)  Myalgia (M79.10)  Insurance (Authorized # of Visits):  Mike Russell (8 visits exp 1/30)  Authorizing Physician: Dr. Hayden Medellin MD [id number]    Standing PEMA RTB 20x    Standing low rows GTB 20x    Wall angels sustained hold [id number]    Standing back to wall shoulder flexion focus on elimination of cervical compensation 10x R/L Seated cervical extension over towel followed by thoracic e

## 2022-01-27 ENCOUNTER — OFFICE VISIT (OUTPATIENT)
Dept: PHYSICAL THERAPY | Age: 47
End: 2022-01-27
Attending: PHYSICAL MEDICINE & REHABILITATION
Payer: MEDICAID

## 2022-01-27 PROCEDURE — 97110 THERAPEUTIC EXERCISES: CPT

## 2022-01-27 PROCEDURE — 97140 MANUAL THERAPY 1/> REGIONS: CPT

## 2022-01-27 NOTE — PROGRESS NOTES
ProgressSummary  Pt has attended 8 visits in Physical Therapy.      Diagnosis: Tendinopathy of rotator cuff, unspecified laterality (M67.919)  Chronic pain of both shoulders (M25.511,G89.29,M25.512)  Myalgia (M79.10)  Insurance (Authorized # of Visits): 5/5; L 5/5         Scapula   Grossly: R: 4/5; L: 4-/5      Joint Mobility:  Scapula: WNL  SC: WNL  AC: WNL  GH: WNL     Special tests:  Neers: (-) B  Oriana Dues: (+) on L  Speeds: (-)  Supraspinatus :(-)  Palpation: TTP at post RTC  Sensation: intact blueTB    Standing back to wall PEMA BTB [id number]    Standing thread the needle at wall 10x R/L     Re-assessment testing    Seated cervical ext over towel with thoracic extension 10x    Doorway pec stretch    Standing PEMA BTB [id number]    Self release of po

## 2022-02-02 ENCOUNTER — TELEPHONE (OUTPATIENT)
Dept: GASTROENTEROLOGY | Facility: CLINIC | Age: 47
End: 2022-02-02

## 2022-02-03 ENCOUNTER — OFFICE VISIT (OUTPATIENT)
Dept: PHYSICAL THERAPY | Age: 47
End: 2022-02-03
Attending: PHYSICAL MEDICINE & REHABILITATION
Payer: MEDICAID

## 2022-02-03 PROCEDURE — 97140 MANUAL THERAPY 1/> REGIONS: CPT

## 2022-02-03 PROCEDURE — 97110 THERAPEUTIC EXERCISES: CPT

## 2022-02-03 NOTE — TELEPHONE ENCOUNTER
Called 2/3 at 2 pm to review with patient  No answer-- left message to call back.    Will try again later today or tomorrow

## 2022-02-04 ENCOUNTER — TELEPHONE (OUTPATIENT)
Dept: BARIATRICS/WEIGHT MGMT | Age: 47
End: 2022-02-04

## 2022-02-04 PROCEDURE — 91035 G-ESOPH REFLX TST W/ELECTROD: CPT | Performed by: INTERNAL MEDICINE

## 2022-02-04 NOTE — TELEPHONE ENCOUNTER
Called patient and discussed video swallow to look at motility  Bravo average [place] was <14.7 but her symptom coordination was very high    Discussed that she could benefit from surgery but may not fix all her symptoms  Give food is coming back up would also do manometry but patient does not want to wait for surgery.  Told her we can do esophagram with marshmallow to assess further    GI staff please fax BRAVO and EGD to bariatric surgeon

## 2022-02-04 NOTE — OPERATIVE REPORT
BRAVO pH REPORT    Patient Name:  Teresa Baez Record #: [id number]  YOB: 1975  Date of Procedure: Placed BRAVO 1/21-- 80 hour test    Referring physician: Devang Marlow MD    Surgeon:  Rochelle Norman MD    Indications: heart bu

## 2022-02-04 NOTE — TELEPHONE ENCOUNTER
Dr. Lakesha Azul--    Did Mookie Jolley specify on which MD she is following for bariatric surgery? Please clarify. Thank you!

## 2022-02-07 ENCOUNTER — APPOINTMENT (OUTPATIENT)
Dept: PHYSICAL THERAPY | Age: 47
End: 2022-02-07
Attending: PHYSICAL MEDICINE & REHABILITATION
Payer: MEDICAID

## 2022-02-07 NOTE — TELEPHONE ENCOUNTER
Spoke with Ritu Sheets to retrieve bariatric surgeon name/contact to fax results required for bariatric surgery. Dr. Aixa Adame part of [address] [place] [address]. Assured I am sending recent procedural results per patient request.     P: [phone]  F: [place] and EGD results to fax number provided. Fax confirmation received 02/07/2022 at 3:08 pm.    No further action required at this time.

## 2022-02-08 RX ORDER — VENLAFAXINE HYDROCHLORIDE 75 MG/1
CAPSULE, EXTENDED RELEASE ORAL
Qty: 90 CAPSULE | Refills: 1 | OUTPATIENT
Start: 2022-02-08

## 2022-02-11 ENCOUNTER — OFFICE VISIT (OUTPATIENT)
Dept: PHYSICAL THERAPY | Age: 47
End: 2022-02-11
Attending: PHYSICAL MEDICINE & REHABILITATION
Payer: MEDICAID

## 2022-02-11 PROCEDURE — 97140 MANUAL THERAPY 1/> REGIONS: CPT

## 2022-02-11 PROCEDURE — 97110 THERAPEUTIC EXERCISES: CPT

## 2022-02-13 ENCOUNTER — LAB ENCOUNTER (OUTPATIENT)
Dept: LAB | Facility: HOSPITAL | Age: 47
End: 2022-02-13
Attending: INTERNAL MEDICINE
Payer: MEDICAID

## 2022-02-13 ENCOUNTER — APPOINTMENT (OUTPATIENT)
Dept: CT IMAGING | Facility: HOSPITAL | Age: 47
End: 2022-02-13
Attending: EMERGENCY MEDICINE
Payer: MEDICAID

## 2022-02-13 ENCOUNTER — HOSPITAL ENCOUNTER (EMERGENCY)
Facility: HOSPITAL | Age: 47
Discharge: HOME | End: 2022-02-13
Attending: EMERGENCY MEDICINE
Payer: MEDICAID

## 2022-02-13 VITALS
DIASTOLIC BLOOD PRESSURE: 103 MMHG | HEART RATE: 84 BPM | RESPIRATION RATE: 16 BRPM | OXYGEN SATURATION: 99 % | WEIGHT: 293 LBS | BODY MASS INDEX: 57.52 KG/M2 | HEIGHT: 60 IN | TEMPERATURE: 98 F | SYSTOLIC BLOOD PRESSURE: 132 MMHG

## 2022-02-13 DIAGNOSIS — R11.10 REGURGITATION OF FOOD: ICD-10-CM

## 2022-02-13 DIAGNOSIS — Z01.818 PREOP TESTING: ICD-10-CM

## 2022-02-13 DIAGNOSIS — R20.2 PARESTHESIAS: Primary | ICD-10-CM

## 2022-02-13 LAB
ANION GAP SERPL CALC-SCNC: 4 MMOL/L (ref 0–18)
BASOPHILS # BLD AUTO: 0.04 X10(3) UL (ref 0–0.2)
BASOPHILS NFR BLD AUTO: 0.4 %
BUN BLD-MCNC: 28 MG/DL (ref 7–18)
BUN/CREAT SERPL: 27.2 (ref 10–20)
CALCIUM BLD-MCNC: 9.5 MG/DL (ref 8.5–10.1)
CHLORIDE SERPL-SCNC: 104 MMOL/L (ref 98–112)
CO2 SERPL-SCNC: 31 MMOL/L (ref 21–32)
CREAT BLD-MCNC: 1.03 MG/DL
DEPRECATED RDW RBC AUTO: 42.5 FL (ref 35.1–46.3)
EOSINOPHIL # BLD AUTO: 0.19 X10(3) UL (ref 0–0.7)
EOSINOPHIL NFR BLD AUTO: 2.1 %
ERYTHROCYTE [DISTWIDTH] IN BLOOD BY AUTOMATED COUNT: 13.2 % (ref 11–15)
GLUCOSE BLD-MCNC: 102 MG/DL (ref 70–99)
HCT VFR BLD AUTO: 44.2 %
HGB BLD-MCNC: 13.8 G/DL
IMM GRANULOCYTES # BLD AUTO: 0.02 X10(3) UL (ref 0–1)
IMM GRANULOCYTES NFR BLD: 0.2 %
LYMPHOCYTES # BLD AUTO: 2.15 X10(3) UL (ref 1–4)
LYMPHOCYTES NFR BLD AUTO: 23.6 %
MCH RBC QN AUTO: 27.7 PG (ref 26–34)
MCHC RBC AUTO-ENTMCNC: 31.2 G/DL (ref 31–37)
MCV RBC AUTO: 88.8 FL
MONOCYTES # BLD AUTO: 0.61 X10(3) UL (ref 0.1–1)
MONOCYTES NFR BLD AUTO: 6.7 %
NEUTROPHILS # BLD AUTO: 6.1 X10 (3) UL (ref 1.5–7.7)
NEUTROPHILS # BLD AUTO: 6.1 X10(3) UL (ref 1.5–7.7)
NEUTROPHILS NFR BLD AUTO: 67 %
OSMOLALITY SERPL CALC.SUM OF ELEC: 294 MOSM/KG (ref 275–295)
PLATELET # BLD AUTO: 285 10(3)UL (ref 150–450)
POTASSIUM SERPL-SCNC: 3.9 MMOL/L (ref 3.5–5.1)
RBC # BLD AUTO: 4.98 X10(6)UL
SODIUM SERPL-SCNC: 139 MMOL/L (ref 136–145)
WBC # BLD AUTO: 9.1 X10(3) UL (ref 4–11)

## 2022-02-13 PROCEDURE — 99284 EMERGENCY DEPT VISIT MOD MDM: CPT

## 2022-02-13 PROCEDURE — 36415 COLL VENOUS BLD VENIPUNCTURE: CPT

## 2022-02-13 PROCEDURE — 85025 COMPLETE CBC W/AUTO DIFF WBC: CPT | Performed by: EMERGENCY MEDICINE

## 2022-02-13 PROCEDURE — 70450 CT HEAD/BRAIN W/O DYE: CPT | Performed by: EMERGENCY MEDICINE

## 2022-02-13 PROCEDURE — 80048 BASIC METABOLIC PNL TOTAL CA: CPT | Performed by: EMERGENCY MEDICINE

## 2022-02-13 NOTE — ED INITIAL ASSESSMENT (HPI)
Tingling to fingertips per pt, also reports feeling cold to hands and feet bilat. No weakness. Onset 11am. A/ox4, respirations unlabored, speech clear.      Pt reports similar tingling to forehead, and fullness to ears

## 2022-02-14 LAB — SARS-COV-2 RNA RESP QL NAA+PROBE: NOT DETECTED

## 2022-02-15 ENCOUNTER — APPOINTMENT (OUTPATIENT)
Dept: PHYSICAL THERAPY | Age: 47
End: 2022-02-15
Attending: PHYSICAL MEDICINE & REHABILITATION
Payer: MEDICAID

## 2022-02-15 RX ORDER — VENLAFAXINE HYDROCHLORIDE 75 MG/1
75 CAPSULE, EXTENDED RELEASE ORAL DAILY
Qty: 90 CAPSULE | Refills: 1 | Status: SHIPPED | OUTPATIENT
Start: 2022-02-15

## 2022-02-15 NOTE — TELEPHONE ENCOUNTER
Pt is scheduled for annual with JLK for 6/28. Message to Joy Brunner [address] if [facility] for refill of venlafaxine?

## 2022-02-16 ENCOUNTER — HOSPITAL ENCOUNTER (OUTPATIENT)
Dept: GENERAL RADIOLOGY | Facility: HOSPITAL | Age: 47
Discharge: HOME | End: 2022-02-16
Attending: INTERNAL MEDICINE
Payer: MEDICAID

## 2022-02-16 DIAGNOSIS — Z01.818 PREOP TESTING: ICD-10-CM

## 2022-02-16 DIAGNOSIS — R11.10 REGURGITATION OF FOOD: ICD-10-CM

## 2022-02-16 PROCEDURE — 74246 X-RAY XM UPR GI TRC 2CNTRST: CPT | Performed by: INTERNAL MEDICINE

## 2022-02-17 ENCOUNTER — TELEPHONE (OUTPATIENT)
Dept: GASTROENTEROLOGY | Facility: CLINIC | Age: 47
End: 2022-02-17

## 2022-02-17 NOTE — TELEPHONE ENCOUNTER
Patient returned call with name and number of bariatric doctor. EGD and pathology results faxed to Dr. Vinod Dia at [facility] at [phone] with return confirmation.   Phone [phone]

## 2022-02-18 ENCOUNTER — OFFICE VISIT (OUTPATIENT)
Dept: PHYSICAL THERAPY | Age: 47
End: 2022-02-18
Attending: PHYSICAL MEDICINE & REHABILITATION
Payer: MEDICAID

## 2022-02-18 PROCEDURE — 97110 THERAPEUTIC EXERCISES: CPT

## 2022-02-18 PROCEDURE — 97140 MANUAL THERAPY 1/> REGIONS: CPT

## 2022-02-21 ENCOUNTER — APPOINTMENT (OUTPATIENT)
Dept: PHYSICAL THERAPY | Age: 47
End: 2022-02-21
Attending: PHYSICAL MEDICINE & REHABILITATION
Payer: MEDICAID

## 2022-02-23 ENCOUNTER — V-VISIT (OUTPATIENT)
Dept: BARIATRICS/WEIGHT MGMT | Age: 47
End: 2022-02-23

## 2022-02-23 DIAGNOSIS — R60.0 LOWER EXTREMITY EDEMA: ICD-10-CM

## 2022-02-23 DIAGNOSIS — G47.33 OSA ON CPAP: ICD-10-CM

## 2022-02-23 DIAGNOSIS — K21.9 GASTROESOPHAGEAL REFLUX DISEASE, UNSPECIFIED WHETHER ESOPHAGITIS PRESENT: ICD-10-CM

## 2022-02-23 DIAGNOSIS — R63.5 WEIGHT GAIN: ICD-10-CM

## 2022-02-23 DIAGNOSIS — K44.9 HIATAL HERNIA WITH GASTROESOPHAGEAL REFLUX DISEASE AND ESOPHAGITIS: Chronic | ICD-10-CM

## 2022-02-23 DIAGNOSIS — K21.00 HIATAL HERNIA WITH GASTROESOPHAGEAL REFLUX DISEASE AND ESOPHAGITIS: Chronic | ICD-10-CM

## 2022-02-23 DIAGNOSIS — E88.810 INSULIN RESISTANCE SYNDROME: ICD-10-CM

## 2022-02-23 DIAGNOSIS — E66.01 MORBID OBESITY WITH BODY MASS INDEX OF 60.0-69.9 IN ADULT (CMD): Primary | ICD-10-CM

## 2022-02-23 DIAGNOSIS — R63.2 BINGE EATING: ICD-10-CM

## 2022-02-23 DIAGNOSIS — G43.909 MIGRAINE WITHOUT STATUS MIGRAINOSUS, NOT INTRACTABLE, UNSPECIFIED MIGRAINE TYPE: ICD-10-CM

## 2022-02-23 PROCEDURE — 99215 OFFICE O/P EST HI 40 MIN: CPT | Performed by: SURGERY

## 2022-02-23 RX ORDER — OMEPRAZOLE 40 MG/1
40 CAPSULE, DELAYED RELEASE ORAL DAILY
Qty: 30 CAPSULE | Refills: 1 | Status: SHIPPED | OUTPATIENT
Start: 2022-02-23 | End: 2022-03-02 | Stop reason: DRUGHIGH

## 2022-02-23 RX ORDER — VIT C/B6/B5/MAGNESIUM/HERB 173 50-5-6-5MG
1000 CAPSULE ORAL DAILY
COMMUNITY
End: 2022-03-24 | Stop reason: SDUPTHER

## 2022-02-23 ASSESSMENT — ENCOUNTER SYMPTOMS
SINUS PRESSURE: 1
RESPIRATORY NEGATIVE: 1
NUMBNESS: 1
GASTROINTESTINAL NEGATIVE: 1
PSYCHIATRIC NEGATIVE: 1
LIGHT-HEADEDNESS: 1
NERVOUS/ANXIOUS: 0
SLEEP DISTURBANCE: 0
DIZZINESS: 0
SEIZURES: 0
CONSTITUTIONAL NEGATIVE: 1

## 2022-02-23 ASSESSMENT — PATIENT HEALTH QUESTIONNAIRE - PHQ9
1. LITTLE INTEREST OR PLEASURE IN DOING THINGS: NOT AT ALL
CLINICAL INTERPRETATION OF PHQ2 SCORE: NO FURTHER SCREENING NEEDED
2. FEELING DOWN, DEPRESSED OR HOPELESS: NOT AT ALL
SUM OF ALL RESPONSES TO PHQ9 QUESTIONS 1 AND 2: 0
SUM OF ALL RESPONSES TO PHQ9 QUESTIONS 1 AND 2: 0

## 2022-02-24 ENCOUNTER — MED REC SCAN ONLY (OUTPATIENT)
Dept: INTERNAL MEDICINE CLINIC | Facility: CLINIC | Age: 47
End: 2022-02-24

## 2022-02-25 ENCOUNTER — OFFICE VISIT (OUTPATIENT)
Dept: SLEEP MEDICINE | Age: 47
End: 2022-02-25

## 2022-02-25 ENCOUNTER — APPOINTMENT (OUTPATIENT)
Dept: PHYSICAL THERAPY | Age: 47
End: 2022-02-25
Attending: PHYSICAL MEDICINE & REHABILITATION
Payer: MEDICAID

## 2022-02-25 VITALS
OXYGEN SATURATION: 99 % | DIASTOLIC BLOOD PRESSURE: 72 MMHG | SYSTOLIC BLOOD PRESSURE: 110 MMHG | WEIGHT: 293 LBS | BODY MASS INDEX: 57.52 KG/M2 | HEIGHT: 60 IN | HEART RATE: 99 BPM

## 2022-02-25 DIAGNOSIS — G47.33 OBSTRUCTIVE SLEEP APNEA (ADULT) (PEDIATRIC): Primary | ICD-10-CM

## 2022-02-25 PROCEDURE — 99214 OFFICE O/P EST MOD 30 MIN: CPT | Performed by: NURSE PRACTITIONER

## 2022-02-25 PROCEDURE — 99211 OFF/OP EST MAY X REQ PHY/QHP: CPT | Performed by: NURSE PRACTITIONER

## 2022-02-25 ASSESSMENT — ENCOUNTER SYMPTOMS
BACK PAIN: 0
PARESTHESIAS: 0
SORE THROAT: 0
NUMBNESS: 0
CHILLS: 0
FEVER: 0
DIARRHEA: 0
PHOTOPHOBIA: 0
CONSTIPATION: 0
VOMITING: 0
EXCESSIVE DAYTIME SLEEPINESS: 0
NAUSEA: 0
BLURRED VISION: 0
SLEEP DISTURBANCES DUE TO BREATHING: 0
SNORING: 0
WEIGHT LOSS: 0

## 2022-02-28 ENCOUNTER — TELEPHONE (OUTPATIENT)
Dept: BARIATRICS/WEIGHT MGMT | Age: 47
End: 2022-02-28

## 2022-03-01 ENCOUNTER — TELEPHONE (OUTPATIENT)
Dept: SLEEP MEDICINE | Age: 47
End: 2022-03-01

## 2022-03-01 ENCOUNTER — APPOINTMENT (OUTPATIENT)
Dept: PHYSICAL THERAPY | Age: 47
End: 2022-03-01
Attending: PHYSICAL MEDICINE & REHABILITATION

## 2022-03-02 DIAGNOSIS — E66.01 MORBID OBESITY WITH BODY MASS INDEX OF 60.0-69.9 IN ADULT (CMD): ICD-10-CM

## 2022-03-02 DIAGNOSIS — E88.810 INSULIN RESISTANCE SYNDROME: Primary | ICD-10-CM

## 2022-03-02 DIAGNOSIS — K21.00 HIATAL HERNIA WITH GASTROESOPHAGEAL REFLUX DISEASE AND ESOPHAGITIS: ICD-10-CM

## 2022-03-02 DIAGNOSIS — K21.9 GASTROESOPHAGEAL REFLUX DISEASE, UNSPECIFIED WHETHER ESOPHAGITIS PRESENT: ICD-10-CM

## 2022-03-02 DIAGNOSIS — K44.9 HIATAL HERNIA WITH GASTROESOPHAGEAL REFLUX DISEASE AND ESOPHAGITIS: ICD-10-CM

## 2022-03-02 RX ORDER — OMEPRAZOLE 40 MG/1
40 CAPSULE, DELAYED RELEASE ORAL DAILY
Qty: 28 CAPSULE | Refills: 0 | Status: SHIPPED | OUTPATIENT
Start: 2022-03-02 | End: 2022-03-23 | Stop reason: SDUPTHER

## 2022-03-03 ENCOUNTER — APPOINTMENT (OUTPATIENT)
Dept: PHYSICAL THERAPY | Age: 47
End: 2022-03-03
Attending: INTERNAL MEDICINE
Payer: MEDICAID

## 2022-03-08 ENCOUNTER — OFFICE VISIT (OUTPATIENT)
Dept: PHYSICAL MEDICINE AND REHAB | Facility: CLINIC | Age: 47
End: 2022-03-08
Payer: MEDICAID

## 2022-03-08 VITALS — WEIGHT: 293 LBS | HEIGHT: 60 IN | BODY MASS INDEX: 57.52 KG/M2 | OXYGEN SATURATION: 99 % | HEART RATE: 81 BPM

## 2022-03-08 DIAGNOSIS — M50.30 DDD (DEGENERATIVE DISC DISEASE), CERVICAL: ICD-10-CM

## 2022-03-08 DIAGNOSIS — M17.0 PRIMARY OSTEOARTHRITIS OF BOTH KNEES: ICD-10-CM

## 2022-03-08 DIAGNOSIS — G25.89 SCAPULAR DYSKINESIS: ICD-10-CM

## 2022-03-08 DIAGNOSIS — M99.9 BIOMECHANICAL LESION: ICD-10-CM

## 2022-03-08 DIAGNOSIS — M25.78 DEGENERATION OF INTERVERTEBRAL DISC OF CERVICAL REGION WITH OSTEOPHYTE OF CERVICAL VERTEBRA: ICD-10-CM

## 2022-03-08 DIAGNOSIS — M25.512 CHRONIC PAIN OF BOTH SHOULDERS: ICD-10-CM

## 2022-03-08 DIAGNOSIS — M22.2X9 PATELLOFEMORAL PAIN SYNDROME, UNSPECIFIED LATERALITY: ICD-10-CM

## 2022-03-08 DIAGNOSIS — M67.919 TENDINOPATHY OF ROTATOR CUFF, UNSPECIFIED LATERALITY: Primary | ICD-10-CM

## 2022-03-08 DIAGNOSIS — M50.30 DEGENERATION OF INTERVERTEBRAL DISC OF CERVICAL REGION WITH OSTEOPHYTE OF CERVICAL VERTEBRA: ICD-10-CM

## 2022-03-08 DIAGNOSIS — M79.10 MYALGIA: ICD-10-CM

## 2022-03-08 DIAGNOSIS — E66.01 CLASS 3 SEVERE OBESITY DUE TO EXCESS CALORIES WITH SERIOUS COMORBIDITY AND BODY MASS INDEX (BMI) GREATER THAN OR EQUAL TO 70 IN ADULT (HCC): ICD-10-CM

## 2022-03-08 DIAGNOSIS — M54.2 TRIGGER POINT OF NECK: ICD-10-CM

## 2022-03-08 DIAGNOSIS — M75.40 SUBACROMIAL IMPINGEMENT, UNSPECIFIED LATERALITY: ICD-10-CM

## 2022-03-08 DIAGNOSIS — M25.511 CHRONIC PAIN OF BOTH SHOULDERS: ICD-10-CM

## 2022-03-08 DIAGNOSIS — G89.29 CHRONIC PAIN OF BOTH SHOULDERS: ICD-10-CM

## 2022-03-08 DIAGNOSIS — M47.812 CERVICAL FACET SYNDROME: ICD-10-CM

## 2022-03-08 PROCEDURE — 3008F BODY MASS INDEX DOCD: CPT | Performed by: PHYSICAL MEDICINE & REHABILITATION

## 2022-03-08 PROCEDURE — 99214 OFFICE O/P EST MOD 30 MIN: CPT | Performed by: PHYSICAL MEDICINE & REHABILITATION

## 2022-03-08 NOTE — PATIENT INSTRUCTIONS
1) Continue with physical therapy for the neck and shoulders  2) Follow up with Dr. Ilana Roblero for the knees  3) Follow up with me in about 3 months. We can consider shoulder injections after your surgery. 4) Try to space out the physical therapy appointments for before and after your surgery.    5) We can consider cervical MRI after the surgery if symptoms continue and possible cervical epidural injections

## 2022-03-11 ENCOUNTER — OFFICE VISIT (OUTPATIENT)
Dept: INTERNAL MEDICINE CLINIC | Facility: CLINIC | Age: 47
End: 2022-03-11
Payer: MEDICAID

## 2022-03-11 VITALS
SYSTOLIC BLOOD PRESSURE: 136 MMHG | DIASTOLIC BLOOD PRESSURE: 86 MMHG | WEIGHT: 293 LBS | BODY MASS INDEX: 57.52 KG/M2 | HEART RATE: 83 BPM | HEIGHT: 60 IN

## 2022-03-11 DIAGNOSIS — Z01.818 PREOPERATIVE CLEARANCE: Primary | ICD-10-CM

## 2022-03-11 DIAGNOSIS — Z99.89 OSA ON CPAP: ICD-10-CM

## 2022-03-11 DIAGNOSIS — E88.81 INSULIN RESISTANCE: ICD-10-CM

## 2022-03-11 DIAGNOSIS — E66.01 MORBID OBESITY WITH BMI OF 60.0-69.9, ADULT (HCC): ICD-10-CM

## 2022-03-11 DIAGNOSIS — K21.9 GASTROESOPHAGEAL REFLUX DISEASE WITHOUT ESOPHAGITIS: ICD-10-CM

## 2022-03-11 DIAGNOSIS — G47.33 OSA ON CPAP: ICD-10-CM

## 2022-03-11 PROCEDURE — 99214 OFFICE O/P EST MOD 30 MIN: CPT | Performed by: INTERNAL MEDICINE

## 2022-03-11 PROCEDURE — 3075F SYST BP GE 130 - 139MM HG: CPT | Performed by: INTERNAL MEDICINE

## 2022-03-11 PROCEDURE — 3008F BODY MASS INDEX DOCD: CPT | Performed by: INTERNAL MEDICINE

## 2022-03-11 PROCEDURE — 3079F DIAST BP 80-89 MM HG: CPT | Performed by: INTERNAL MEDICINE

## 2022-03-14 ENCOUNTER — OFFICE VISIT (OUTPATIENT)
Dept: ORTHOPEDICS CLINIC | Facility: CLINIC | Age: 47
End: 2022-03-14
Payer: MEDICAID

## 2022-03-14 VITALS — DIASTOLIC BLOOD PRESSURE: 82 MMHG | SYSTOLIC BLOOD PRESSURE: 118 MMHG | HEART RATE: 91 BPM

## 2022-03-14 DIAGNOSIS — M17.0 BILATERAL PRIMARY OSTEOARTHRITIS OF KNEE: Primary | ICD-10-CM

## 2022-03-14 PROBLEM — R53.83 FATIGUE: Status: RESOLVED | Noted: 2017-06-15 | Resolved: 2022-03-14

## 2022-03-14 PROBLEM — R60.0 LOWER EXTREMITY EDEMA: Status: RESOLVED | Noted: 2017-06-15 | Resolved: 2022-03-14

## 2022-03-14 PROBLEM — H52.4 HYPEROPIA WITH PRESBYOPIA OF BOTH EYES: Status: RESOLVED | Noted: 2021-11-23 | Resolved: 2022-03-14

## 2022-03-14 PROBLEM — H52.03 HYPEROPIA WITH PRESBYOPIA OF BOTH EYES: Status: RESOLVED | Noted: 2021-11-23 | Resolved: 2022-03-14

## 2022-03-14 PROBLEM — F43.9 STRESS: Status: RESOLVED | Noted: 2021-06-08 | Resolved: 2022-03-14

## 2022-03-14 PROBLEM — R63.5 WEIGHT GAIN: Status: RESOLVED | Noted: 2021-06-08 | Resolved: 2022-03-14

## 2022-03-14 PROCEDURE — 20610 DRAIN/INJ JOINT/BURSA W/O US: CPT | Performed by: ORTHOPAEDIC SURGERY

## 2022-03-14 PROCEDURE — 3074F SYST BP LT 130 MM HG: CPT | Performed by: ORTHOPAEDIC SURGERY

## 2022-03-14 PROCEDURE — 3079F DIAST BP 80-89 MM HG: CPT | Performed by: ORTHOPAEDIC SURGERY

## 2022-03-14 RX ORDER — TRIAMCINOLONE ACETONIDE 40 MG/ML
40 INJECTION, SUSPENSION INTRA-ARTICULAR; INTRAMUSCULAR ONCE
Status: COMPLETED | OUTPATIENT
Start: 2022-03-14 | End: 2022-03-14

## 2022-03-14 RX ADMIN — TRIAMCINOLONE ACETONIDE 40 MG: 40 INJECTION, SUSPENSION INTRA-ARTICULAR; INTRAMUSCULAR at 12:42:00

## 2022-03-14 RX ADMIN — TRIAMCINOLONE ACETONIDE 40 MG: 40 INJECTION, SUSPENSION INTRA-ARTICULAR; INTRAMUSCULAR at 12:43:00

## 2022-03-14 NOTE — PROCEDURES
The patient identified the left and right knee(s) as the correct procedure site. This was verified with the consent and with 2 patient identifiers. The knee injection site was prepped with betadine and alcohol. A 22-gauge needle was introduced into the knee. The knee was  injected with 40 mg of Kenalog and 4 mL of 1% lidocaine. The patient tolerated the procedure well. A soft dressing was applied.

## 2022-03-14 NOTE — PROGRESS NOTES
Per verbal order from Dr. Helen Howell, draw up 4ml of 1% lidocaine and 1ml of Kenalog 40 for cortisone injection to bilateral knee Sophie Lester    Patient provided education handout for cortisone injection.

## 2022-03-22 ENCOUNTER — TELEPHONE (OUTPATIENT)
Dept: NEUROLOGY | Facility: CLINIC | Age: 47
End: 2022-03-22

## 2022-03-23 DIAGNOSIS — K21.9 GASTROESOPHAGEAL REFLUX DISEASE, UNSPECIFIED WHETHER ESOPHAGITIS PRESENT: ICD-10-CM

## 2022-03-23 DIAGNOSIS — K21.00 HIATAL HERNIA WITH GASTROESOPHAGEAL REFLUX DISEASE AND ESOPHAGITIS: ICD-10-CM

## 2022-03-23 DIAGNOSIS — E66.01 MORBID OBESITY WITH BODY MASS INDEX OF 60.0-69.9 IN ADULT (CMD): ICD-10-CM

## 2022-03-23 DIAGNOSIS — K44.9 HIATAL HERNIA WITH GASTROESOPHAGEAL REFLUX DISEASE AND ESOPHAGITIS: ICD-10-CM

## 2022-03-23 DIAGNOSIS — E88.810 INSULIN RESISTANCE SYNDROME: ICD-10-CM

## 2022-03-23 NOTE — TELEPHONE ENCOUNTER
PA approval letter received    Case # [id number]    Ajovy 225mg/1.5ml SOL Auto Inj APPROVED from 1/1/22-3/22/23    LETTER PLACED IN SCANNING BIN

## 2022-03-24 ENCOUNTER — OFFICE VISIT (OUTPATIENT)
Dept: BARIATRICS/WEIGHT MGMT | Age: 47
End: 2022-03-24

## 2022-03-24 ENCOUNTER — DOCUMENTATION (OUTPATIENT)
Dept: BARIATRICS/WEIGHT MGMT | Age: 47
End: 2022-03-24

## 2022-03-24 VITALS
HEIGHT: 60 IN | BODY MASS INDEX: 57.52 KG/M2 | OXYGEN SATURATION: 96 % | SYSTOLIC BLOOD PRESSURE: 133 MMHG | WEIGHT: 293 LBS | DIASTOLIC BLOOD PRESSURE: 93 MMHG | HEART RATE: 80 BPM | TEMPERATURE: 97.7 F

## 2022-03-24 DIAGNOSIS — E66.01 MORBID OBESITY WITH BODY MASS INDEX OF 60.0-69.9 IN ADULT (CMD): Primary | ICD-10-CM

## 2022-03-24 DIAGNOSIS — R63.5 WEIGHT GAIN: ICD-10-CM

## 2022-03-24 DIAGNOSIS — G47.33 OSA ON CPAP: ICD-10-CM

## 2022-03-24 DIAGNOSIS — K21.9 GASTROESOPHAGEAL REFLUX DISEASE, UNSPECIFIED WHETHER ESOPHAGITIS PRESENT: ICD-10-CM

## 2022-03-24 DIAGNOSIS — G43.909 MIGRAINE WITHOUT STATUS MIGRAINOSUS, NOT INTRACTABLE, UNSPECIFIED MIGRAINE TYPE: ICD-10-CM

## 2022-03-24 DIAGNOSIS — R63.2 BINGE EATING: ICD-10-CM

## 2022-03-24 DIAGNOSIS — E88.810 INSULIN RESISTANCE SYNDROME: ICD-10-CM

## 2022-03-24 DIAGNOSIS — R60.0 LOWER EXTREMITY EDEMA: ICD-10-CM

## 2022-03-24 DIAGNOSIS — K21.00 HIATAL HERNIA WITH GASTROESOPHAGEAL REFLUX DISEASE AND ESOPHAGITIS: Chronic | ICD-10-CM

## 2022-03-24 DIAGNOSIS — K44.9 HIATAL HERNIA WITH GASTROESOPHAGEAL REFLUX DISEASE AND ESOPHAGITIS: Chronic | ICD-10-CM

## 2022-03-24 DIAGNOSIS — Z01.812 PRE-PROCEDURE LAB EXAM: ICD-10-CM

## 2022-03-24 PROCEDURE — X1171 BARIATRIC INITIAL PROGRAM FEE: HCPCS | Performed by: SURGERY

## 2022-03-24 RX ORDER — APREPITANT 40 MG/1
40 CAPSULE ORAL
Status: CANCELLED | OUTPATIENT
Start: 2022-03-24

## 2022-03-24 RX ORDER — SCOLOPAMINE TRANSDERMAL SYSTEM 1 MG/1
1 PATCH, EXTENDED RELEASE TRANSDERMAL ONCE
Status: CANCELLED | OUTPATIENT
Start: 2022-03-24 | End: 2022-03-24

## 2022-03-24 RX ORDER — GABAPENTIN 300 MG/1
300 CAPSULE ORAL
Qty: 1 CAPSULE | Refills: 0 | Status: ON HOLD | OUTPATIENT
Start: 2022-03-24 | End: 2022-04-08 | Stop reason: HOSPADM

## 2022-03-24 RX ORDER — TIZANIDINE 4 MG/1
4 TABLET ORAL EVERY 6 HOURS PRN
Qty: 5 TABLET | Refills: 0 | Status: ON HOLD | OUTPATIENT
Start: 2022-03-24 | End: 2022-04-08 | Stop reason: HOSPADM

## 2022-03-24 RX ORDER — TIZANIDINE 2 MG/1
4 TABLET ORAL ONCE
Status: CANCELLED | OUTPATIENT
Start: 2022-03-24 | End: 2022-03-24

## 2022-03-24 RX ORDER — OMEPRAZOLE 40 MG/1
40 CAPSULE, DELAYED RELEASE ORAL DAILY
Qty: 30 CAPSULE | Refills: 0 | Status: SHIPPED | OUTPATIENT
Start: 2022-03-24 | End: 2022-03-24 | Stop reason: SDUPTHER

## 2022-03-24 RX ORDER — ENOXAPARIN SODIUM 100 MG/ML
60 INJECTION SUBCUTANEOUS EVERY 12 HOURS
Qty: 16.8 ML | Refills: 0 | Status: ON HOLD | OUTPATIENT
Start: 2022-03-24 | End: 2022-04-08

## 2022-03-24 RX ORDER — ONDANSETRON 4 MG/1
4 TABLET, FILM COATED ORAL EVERY 8 HOURS PRN
Qty: 5 TABLET | Refills: 0 | Status: SHIPPED | OUTPATIENT
Start: 2022-03-24 | End: 2022-03-29

## 2022-03-24 RX ORDER — 0.9 % SODIUM CHLORIDE 0.9 %
2 VIAL (ML) INJECTION EVERY 12 HOURS SCHEDULED
Status: CANCELLED | OUTPATIENT
Start: 2022-03-24

## 2022-03-24 RX ORDER — SODIUM CHLORIDE, SODIUM LACTATE, POTASSIUM CHLORIDE, CALCIUM CHLORIDE 600; 310; 30; 20 MG/100ML; MG/100ML; MG/100ML; MG/100ML
INJECTION, SOLUTION INTRAVENOUS CONTINUOUS
Status: CANCELLED | OUTPATIENT
Start: 2022-03-24

## 2022-03-24 RX ORDER — CHLORHEXIDINE GLUCONATE ORAL RINSE 1.2 MG/ML
15 SOLUTION DENTAL
Status: CANCELLED | OUTPATIENT
Start: 2022-03-24

## 2022-03-24 RX ORDER — OMEPRAZOLE 40 MG/1
40 CAPSULE, DELAYED RELEASE ORAL DAILY
Qty: 30 CAPSULE | Refills: 0 | Status: SHIPPED | OUTPATIENT
Start: 2022-03-24 | End: 2022-05-09 | Stop reason: ALTCHOICE

## 2022-03-24 RX ORDER — GABAPENTIN 100 MG/1
300 CAPSULE ORAL
Status: CANCELLED | OUTPATIENT
Start: 2022-03-24

## 2022-03-24 RX ORDER — ACETAMINOPHEN 500 MG
1000 TABLET ORAL
Status: CANCELLED | OUTPATIENT
Start: 2022-03-24

## 2022-03-24 RX ORDER — HEPARIN SODIUM 5000 [USP'U]/ML
5000 INJECTION, SOLUTION INTRAVENOUS; SUBCUTANEOUS ONCE
Status: CANCELLED | OUTPATIENT
Start: 2022-03-24 | End: 2022-03-24

## 2022-03-24 RX ORDER — APREPITANT 40 MG/1
40 CAPSULE ORAL
Qty: 1 CAPSULE | Refills: 0 | Status: ON HOLD | OUTPATIENT
Start: 2022-03-24 | End: 2022-04-08 | Stop reason: HOSPADM

## 2022-03-24 ASSESSMENT — ENCOUNTER SYMPTOMS
CONSTITUTIONAL NEGATIVE: 1
NERVOUS/ANXIOUS: 0
NAUSEA: 0
DIARRHEA: 1
SLEEP DISTURBANCE: 0
BACK PAIN: 1
PSYCHIATRIC NEGATIVE: 1
ABDOMINAL PAIN: 0
RESPIRATORY NEGATIVE: 1
CONSTIPATION: 1
NEUROLOGICAL NEGATIVE: 1
VOMITING: 0

## 2022-03-24 ASSESSMENT — PATIENT HEALTH QUESTIONNAIRE - PHQ9
CLINICAL INTERPRETATION OF PHQ2 SCORE: NO FURTHER SCREENING NEEDED
SUM OF ALL RESPONSES TO PHQ9 QUESTIONS 1 AND 2: 0
1. LITTLE INTEREST OR PLEASURE IN DOING THINGS: NOT AT ALL
2. FEELING DOWN, DEPRESSED OR HOPELESS: NOT AT ALL
SUM OF ALL RESPONSES TO PHQ9 QUESTIONS 1 AND 2: 0

## 2022-03-29 ENCOUNTER — OFFICE VISIT (OUTPATIENT)
Dept: NEUROLOGY | Facility: CLINIC | Age: 47
End: 2022-03-29
Payer: MEDICAID

## 2022-03-29 VITALS
BODY MASS INDEX: 57.52 KG/M2 | HEART RATE: 85 BPM | HEIGHT: 60 IN | DIASTOLIC BLOOD PRESSURE: 84 MMHG | SYSTOLIC BLOOD PRESSURE: 126 MMHG | WEIGHT: 293 LBS

## 2022-03-29 DIAGNOSIS — G43.011 INTRACTABLE MIGRAINE WITHOUT AURA AND WITH STATUS MIGRAINOSUS: Primary | ICD-10-CM

## 2022-03-29 DIAGNOSIS — R20.2 PARESTHESIAS: ICD-10-CM

## 2022-03-29 PROCEDURE — 3008F BODY MASS INDEX DOCD: CPT | Performed by: OTHER

## 2022-03-29 PROCEDURE — 3079F DIAST BP 80-89 MM HG: CPT | Performed by: OTHER

## 2022-03-29 PROCEDURE — 3074F SYST BP LT 130 MM HG: CPT | Performed by: OTHER

## 2022-03-29 PROCEDURE — 99214 OFFICE O/P EST MOD 30 MIN: CPT | Performed by: OTHER

## 2022-03-29 RX ORDER — FREMANEZUMAB-VFRM 225 MG/1.5ML
225 INJECTION SUBCUTANEOUS
Qty: 1.5 ML | Refills: 5 | Status: SHIPPED | OUTPATIENT
Start: 2022-03-29

## 2022-03-29 NOTE — PATIENT INSTRUCTIONS
-Continue Ajovy   -Keep symptom diary, check blood pressure during an episode   -Follow up in 3 month or sooner if needed. -If you develop sudden onset loss of vision, double vision, room spinning/world spinning sensation, inability to speak or understand others who are speaking to you, slurred speech, balance problems, weakness on one side of your body, numbness on one side of your body or worst headache you ever had, please seek out emergency medical attention via 911 for the nearest emergency room to be evaluated for possible stroke. -MyChart or call office with any questions or concerns. Thank you for coming to see me today. The easiest way to communicate with me is via Wisecamt. Wisecamt is meant for simple questions regarding medications, possible side effects, or other simple straight forward questions in limited sentences, rather than multiple paragraphs of discussion. Wisecamt is not meant for, or efficient for these complex questions, extensive questions, extensive medication adjustments, complex new symptoms or concerns. These issues beyond simple questions require a follow up visit with myself. Refills:  Please pay attention to when your refills will need to be renewed. Due to the volume of phone calls daily, this could potentially take a few days, although we certainly try to honor your refill requests as soon as we can. You should call at least 1 week in advance of needing a refill to ensure you do not run out of medication. Keep in mind that refill requests on Fridays may not be filled until the following week.

## 2022-03-30 ENCOUNTER — LAB SERVICES (OUTPATIENT)
Dept: LAB | Age: 47
End: 2022-03-30

## 2022-03-30 ENCOUNTER — HOSPITAL ENCOUNTER (OUTPATIENT)
Dept: ULTRASOUND IMAGING | Age: 47
Discharge: HOME | End: 2022-03-30
Attending: SURGERY

## 2022-03-30 ENCOUNTER — TELEPHONE (OUTPATIENT)
Dept: NEUROLOGY | Facility: CLINIC | Age: 47
End: 2022-03-30

## 2022-03-30 DIAGNOSIS — G43.909 MIGRAINE WITHOUT STATUS MIGRAINOSUS, NOT INTRACTABLE, UNSPECIFIED MIGRAINE TYPE: ICD-10-CM

## 2022-03-30 DIAGNOSIS — K21.9 GASTROESOPHAGEAL REFLUX DISEASE, UNSPECIFIED WHETHER ESOPHAGITIS PRESENT: ICD-10-CM

## 2022-03-30 DIAGNOSIS — G47.33 OSA ON CPAP: ICD-10-CM

## 2022-03-30 DIAGNOSIS — R63.5 WEIGHT GAIN: ICD-10-CM

## 2022-03-30 DIAGNOSIS — R60.0 LOWER EXTREMITY EDEMA: ICD-10-CM

## 2022-03-30 DIAGNOSIS — R63.2 BINGE EATING: ICD-10-CM

## 2022-03-30 DIAGNOSIS — E66.01 MORBID OBESITY WITH BODY MASS INDEX OF 60.0-69.9 IN ADULT (CMD): ICD-10-CM

## 2022-03-30 DIAGNOSIS — K21.00 HIATAL HERNIA WITH GASTROESOPHAGEAL REFLUX DISEASE AND ESOPHAGITIS: ICD-10-CM

## 2022-03-30 DIAGNOSIS — Z01.812 PRE-PROCEDURE LAB EXAM: ICD-10-CM

## 2022-03-30 DIAGNOSIS — E88.810 INSULIN RESISTANCE SYNDROME: ICD-10-CM

## 2022-03-30 DIAGNOSIS — K44.9 HIATAL HERNIA WITH GASTROESOPHAGEAL REFLUX DISEASE AND ESOPHAGITIS: ICD-10-CM

## 2022-03-30 LAB
ALBUMIN SERPL-MCNC: 3.5 G/DL (ref 3.6–5.1)
ALBUMIN/GLOB SERPL: 0.9 {RATIO} (ref 1–2.4)
ALP SERPL-CCNC: 73 UNITS/L (ref 45–117)
ALT SERPL-CCNC: 23 UNITS/L
ANION GAP SERPL CALC-SCNC: 9 MMOL/L (ref 10–20)
AST SERPL-CCNC: 11 UNITS/L
BASOPHILS # BLD: 0 K/MCL (ref 0–0.3)
BASOPHILS NFR BLD: 1 %
BILIRUB SERPL-MCNC: 0.4 MG/DL (ref 0.2–1)
BUN SERPL-MCNC: 27 MG/DL (ref 6–20)
BUN/CREAT SERPL: 38 (ref 7–25)
CALCIUM SERPL-MCNC: 9 MG/DL (ref 8.4–10.2)
CHLORIDE SERPL-SCNC: 105 MMOL/L (ref 98–107)
CO2 SERPL-SCNC: 30 MMOL/L (ref 21–32)
CREAT SERPL-MCNC: 0.71 MG/DL (ref 0.51–0.95)
DEPRECATED RDW RBC: 44.9 FL (ref 39–50)
EOSINOPHIL # BLD: 0.1 K/MCL (ref 0–0.5)
EOSINOPHIL NFR BLD: 3 %
ERYTHROCYTE [DISTWIDTH] IN BLOOD: 13.7 % (ref 11–15)
FASTING DURATION TIME PATIENT: 0 HOURS (ref 0–999)
GFR SERPLBLD BASED ON 1.73 SQ M-ARVRAT: >90 ML/MIN
GLOBULIN SER-MCNC: 3.9 G/DL (ref 2–4)
GLUCOSE SERPL-MCNC: 87 MG/DL (ref 70–99)
HCG SERPL QL: NEGATIVE
HCT VFR BLD CALC: 45 % (ref 36–46.5)
HGB BLD-MCNC: 14.1 G/DL (ref 12–15.5)
IMM GRANULOCYTES # BLD AUTO: 0 K/MCL (ref 0–0.2)
IMM GRANULOCYTES # BLD: 0 %
LYMPHOCYTES # BLD: 1.4 K/MCL (ref 1–4.8)
LYMPHOCYTES NFR BLD: 25 %
MCH RBC QN AUTO: 28 PG (ref 26–34)
MCHC RBC AUTO-ENTMCNC: 31.3 G/DL (ref 32–36.5)
MCV RBC AUTO: 89.5 FL (ref 78–100)
MONOCYTES # BLD: 0.4 K/MCL (ref 0.3–0.9)
MONOCYTES NFR BLD: 7 %
NEUTROPHILS # BLD: 3.7 K/MCL (ref 1.8–7.7)
NEUTROPHILS NFR BLD: 64 %
NRBC BLD MANUAL-RTO: 0 /100 WBC
PLATELET # BLD AUTO: 231 K/MCL (ref 140–450)
POTASSIUM SERPL-SCNC: 4.4 MMOL/L (ref 3.4–5.1)
PROT SERPL-MCNC: 7.4 G/DL (ref 6.4–8.2)
RBC # BLD: 5.03 MIL/MCL (ref 4–5.2)
SODIUM SERPL-SCNC: 140 MMOL/L (ref 135–145)
WBC # BLD: 5.7 K/MCL (ref 4.2–11)

## 2022-03-30 PROCEDURE — 85025 COMPLETE CBC W/AUTO DIFF WBC: CPT | Performed by: INTERNAL MEDICINE

## 2022-03-30 PROCEDURE — 84703 CHORIONIC GONADOTROPIN ASSAY: CPT | Performed by: INTERNAL MEDICINE

## 2022-03-30 PROCEDURE — 80053 COMPREHEN METABOLIC PANEL: CPT | Performed by: INTERNAL MEDICINE

## 2022-03-30 PROCEDURE — 93970 EXTREMITY STUDY: CPT

## 2022-03-30 PROCEDURE — 36415 COLL VENOUS BLD VENIPUNCTURE: CPT | Performed by: INTERNAL MEDICINE

## 2022-03-30 NOTE — TELEPHONE ENCOUNTER
PA Approval letter received.     Ajovy 225mg/1.5ML SOLN Prefilled Syringe up to 3 syringes per 84 days approved from 3/2/22-3/22/23    Case # [id number]    Letter placed in scanning

## 2022-04-05 ENCOUNTER — HOSPITAL ENCOUNTER (OUTPATIENT)
Dept: LAB | Age: 47
Discharge: HOME | End: 2022-04-05
Attending: SURGERY

## 2022-04-05 DIAGNOSIS — R63.5 WEIGHT GAIN: ICD-10-CM

## 2022-04-05 DIAGNOSIS — K21.9 GASTROESOPHAGEAL REFLUX DISEASE, UNSPECIFIED WHETHER ESOPHAGITIS PRESENT: ICD-10-CM

## 2022-04-05 DIAGNOSIS — K21.00 HIATAL HERNIA WITH GASTROESOPHAGEAL REFLUX DISEASE AND ESOPHAGITIS: Chronic | ICD-10-CM

## 2022-04-05 DIAGNOSIS — R63.2 BINGE EATING: ICD-10-CM

## 2022-04-05 DIAGNOSIS — E88.810 INSULIN RESISTANCE SYNDROME: ICD-10-CM

## 2022-04-05 DIAGNOSIS — K44.9 HIATAL HERNIA WITH GASTROESOPHAGEAL REFLUX DISEASE AND ESOPHAGITIS: Chronic | ICD-10-CM

## 2022-04-05 DIAGNOSIS — E66.01 MORBID OBESITY WITH BODY MASS INDEX OF 60.0-69.9 IN ADULT (CMD): ICD-10-CM

## 2022-04-05 DIAGNOSIS — G43.909 MIGRAINE WITHOUT STATUS MIGRAINOSUS, NOT INTRACTABLE, UNSPECIFIED MIGRAINE TYPE: ICD-10-CM

## 2022-04-05 DIAGNOSIS — R60.0 LOWER EXTREMITY EDEMA: ICD-10-CM

## 2022-04-05 DIAGNOSIS — G47.33 OSA ON CPAP: ICD-10-CM

## 2022-04-05 DIAGNOSIS — Z01.812 PRE-PROCEDURE LAB EXAM: ICD-10-CM

## 2022-04-05 LAB
SARS-COV-2 RNA RESP QL NAA+PROBE: NOT DETECTED
SERVICE CMNT-IMP: NORMAL
SERVICE CMNT-IMP: NORMAL

## 2022-04-05 PROCEDURE — U0003 INFECTIOUS AGENT DETECTION BY NUCLEIC ACID (DNA OR RNA); SEVERE ACUTE RESPIRATORY SYNDROME CORONAVIRUS 2 (SARS-COV-2) (CORONAVIRUS DISEASE [COVID-19]), AMPLIFIED PROBE TECHNIQUE, MAKING USE OF HIGH THROUGHPUT TECHNOLOGIES AS DESCRIBED BY CMS-2020-01-R: HCPCS

## 2022-04-05 ASSESSMENT — ACTIVITIES OF DAILY LIVING (ADL)
ADL_SCORE: 12
ADL_BEFORE_ADMISSION: INDEPENDENT
SENSORY_SUPPORT_DEVICES: EYEGLASSES

## 2022-04-06 ENCOUNTER — HOSPITAL ENCOUNTER (OUTPATIENT)
Dept: LAB | Age: 47
Discharge: HOME | End: 2022-04-06
Attending: SURGERY

## 2022-04-06 DIAGNOSIS — Z01.818 PRE-OP EXAM: ICD-10-CM

## 2022-04-06 DIAGNOSIS — Z01.818 PRE-OP EXAM: Primary | ICD-10-CM

## 2022-04-06 LAB
ATRIAL RATE (BPM): 68
P AXIS (DEGREES): 15
PR-INTERVAL (MSEC): 134
QRS-INTERVAL (MSEC): 78
QT-INTERVAL (MSEC): 390
QTC: 415
R AXIS (DEGREES): 44
REPORT TEXT: NORMAL
T AXIS (DEGREES): 28
VENTRICULAR RATE EKG/MIN (BPM): 68

## 2022-04-06 PROCEDURE — 93005 ELECTROCARDIOGRAM TRACING: CPT | Performed by: SURGERY

## 2022-04-07 ENCOUNTER — HOSPITAL ENCOUNTER (INPATIENT)
Age: 47
LOS: 1 days | Discharge: HOME | DRG: 403 | End: 2022-04-08
Attending: SURGERY | Admitting: FAMILY MEDICINE

## 2022-04-07 ENCOUNTER — ANESTHESIA (OUTPATIENT)
Dept: SURGERY | Age: 47
DRG: 403 | End: 2022-04-07

## 2022-04-07 ENCOUNTER — ANESTHESIA EVENT (OUTPATIENT)
Dept: SURGERY | Age: 47
DRG: 403 | End: 2022-04-07

## 2022-04-07 ENCOUNTER — MED REC SCAN ONLY (OUTPATIENT)
Dept: INTERNAL MEDICINE CLINIC | Facility: CLINIC | Age: 47
End: 2022-04-07

## 2022-04-07 ENCOUNTER — APPOINTMENT (OUTPATIENT)
Dept: GENERAL RADIOLOGY | Age: 47
DRG: 403 | End: 2022-04-07
Attending: SURGERY

## 2022-04-07 DIAGNOSIS — G47.33 OSA ON CPAP: ICD-10-CM

## 2022-04-07 DIAGNOSIS — K66.0 ABDOMINAL ADHESIONS: Primary | ICD-10-CM

## 2022-04-07 DIAGNOSIS — E88.810 INSULIN RESISTANCE SYNDROME: ICD-10-CM

## 2022-04-07 DIAGNOSIS — K21.00 HIATAL HERNIA WITH GASTROESOPHAGEAL REFLUX DISEASE AND ESOPHAGITIS: Chronic | ICD-10-CM

## 2022-04-07 DIAGNOSIS — R60.0 LOWER EXTREMITY EDEMA: ICD-10-CM

## 2022-04-07 DIAGNOSIS — R63.5 WEIGHT GAIN: ICD-10-CM

## 2022-04-07 DIAGNOSIS — K21.9 GASTROESOPHAGEAL REFLUX DISEASE, UNSPECIFIED WHETHER ESOPHAGITIS PRESENT: ICD-10-CM

## 2022-04-07 DIAGNOSIS — Z01.812 PRE-PROCEDURE LAB EXAM: ICD-10-CM

## 2022-04-07 DIAGNOSIS — G43.909 MIGRAINE WITHOUT STATUS MIGRAINOSUS, NOT INTRACTABLE, UNSPECIFIED MIGRAINE TYPE: ICD-10-CM

## 2022-04-07 DIAGNOSIS — E66.01 MORBID OBESITY WITH BODY MASS INDEX OF 60.0-69.9 IN ADULT (CMD): ICD-10-CM

## 2022-04-07 DIAGNOSIS — R63.2 BINGE EATING: ICD-10-CM

## 2022-04-07 DIAGNOSIS — K44.9 HIATAL HERNIA WITH GASTROESOPHAGEAL REFLUX DISEASE AND ESOPHAGITIS: Chronic | ICD-10-CM

## 2022-04-07 PROBLEM — F41.9 ANXIETY DISORDER: Status: ACTIVE | Noted: 2022-04-07

## 2022-04-07 LAB
GLUCOSE BLDC GLUCOMTR-MCNC: 115 MG/DL (ref 70–99)
GLUCOSE BLDC GLUCOMTR-MCNC: 147 MG/DL (ref 70–99)
GLUCOSE BLDC GLUCOMTR-MCNC: 164 MG/DL (ref 70–99)

## 2022-04-07 PROCEDURE — 10002801 HB RX 250 W/O HCPCS: Performed by: ANESTHESIOLOGY

## 2022-04-07 PROCEDURE — 74240 X-RAY XM UPR GI TRC 1CNTRST: CPT

## 2022-04-07 PROCEDURE — 99254 IP/OBS CNSLTJ NEW/EST MOD 60: CPT | Performed by: INTERNAL MEDICINE

## 2022-04-07 PROCEDURE — 10002807 HB RX 258: Performed by: SURGERY

## 2022-04-07 PROCEDURE — 10002800 HB RX 250 W HCPCS: Performed by: SURGERY

## 2022-04-07 PROCEDURE — 10002800 HB RX 250 W HCPCS: Performed by: ANESTHESIOLOGY

## 2022-04-07 PROCEDURE — 10006027 HB SUPPLY 278: Performed by: SURGERY

## 2022-04-07 PROCEDURE — 10004281 HB COUNTER-STAFF TIME PER 15 MIN

## 2022-04-07 PROCEDURE — 13001086 HB INCENTIVE SPIROMETER W INSTRUCT

## 2022-04-07 PROCEDURE — 82962 GLUCOSE BLOOD TEST: CPT

## 2022-04-07 PROCEDURE — 13000098 HB GENERAL ROBOTIC CASE S/U + 1ST 15 MIN: Performed by: SURGERY

## 2022-04-07 PROCEDURE — 10004452 HB PACU ADDL 30 MINUTES: Performed by: SURGERY

## 2022-04-07 PROCEDURE — 13000002 HB ANESTHESIA  GENERAL  S/U + 1ST 15 MIN: Performed by: SURGERY

## 2022-04-07 PROCEDURE — 10004651 HB RX, NO CHARGE ITEM: Performed by: SURGERY

## 2022-04-07 PROCEDURE — X1094 NO CHARGE VISIT: HCPCS | Performed by: SURGERY

## 2022-04-07 PROCEDURE — 10002801 HB RX 250 W/O HCPCS: Performed by: SURGERY

## 2022-04-07 PROCEDURE — 13000099 HB GENERAL ROBOTIC CASE EA ADD MINUTE: Performed by: SURGERY

## 2022-04-07 PROCEDURE — 10002803 HB RX 637: Performed by: SURGERY

## 2022-04-07 PROCEDURE — 10002807 HB RX 258: Performed by: ANESTHESIOLOGY

## 2022-04-07 PROCEDURE — 94660 CPAP INITIATION&MGMT: CPT

## 2022-04-07 PROCEDURE — 43644 LAP GASTRIC BYPASS/ROUX-EN-Y: CPT | Performed by: SURGERY

## 2022-04-07 PROCEDURE — 10006031 HB ROOM CHARGE TELEMETRY

## 2022-04-07 PROCEDURE — 10002016 HB COUNTER INCENTIVE SPIROMETRY

## 2022-04-07 PROCEDURE — 10004451 HB PACU RECOVERY 1ST 30 MINUTES: Performed by: SURGERY

## 2022-04-07 PROCEDURE — 8E0W4CZ ROBOTIC ASSISTED PROCEDURE OF TRUNK REGION, PERCUTANEOUS ENDOSCOPIC APPROACH: ICD-10-PCS | Performed by: SURGERY

## 2022-04-07 PROCEDURE — 10006023 HB SUPPLY 272: Performed by: SURGERY

## 2022-04-07 PROCEDURE — 13000003 HB ANESTHESIA  GENERAL EA ADD MINUTE: Performed by: SURGERY

## 2022-04-07 PROCEDURE — 0D164ZA BYPASS STOMACH TO JEJUNUM, PERCUTANEOUS ENDOSCOPIC APPROACH: ICD-10-PCS | Performed by: SURGERY

## 2022-04-07 DEVICE — STAPLER 60 RELOAD WHITE
Type: IMPLANTABLE DEVICE | Site: ABDOMEN | Status: FUNCTIONAL
Brand: SUREFORM

## 2022-04-07 DEVICE — STAPLER 60 RELOAD BLUE
Type: IMPLANTABLE DEVICE | Site: ABDOMEN | Status: FUNCTIONAL
Brand: SUREFORM

## 2022-04-07 DEVICE — FLOSEAL HEMOSTATIC MATRIX, 10ML
Type: IMPLANTABLE DEVICE | Site: ABDOMEN | Status: FUNCTIONAL
Brand: FLOSEAL HEMOSTATIC MATRIX

## 2022-04-07 RX ORDER — GLYCOPYRROLATE 0.2 MG/ML
INJECTION, SOLUTION INTRAMUSCULAR; INTRAVENOUS PRN
Status: DISCONTINUED | OUTPATIENT
Start: 2022-04-07 | End: 2022-04-07

## 2022-04-07 RX ORDER — METOCLOPRAMIDE HYDROCHLORIDE 5 MG/ML
INJECTION INTRAMUSCULAR; INTRAVENOUS PRN
Status: DISCONTINUED | OUTPATIENT
Start: 2022-04-07 | End: 2022-04-07

## 2022-04-07 RX ORDER — ALBUTEROL SULFATE 2.5 MG/3ML
5 SOLUTION RESPIRATORY (INHALATION) ONCE
Status: DISCONTINUED | OUTPATIENT
Start: 2022-04-07 | End: 2022-04-07 | Stop reason: HOSPADM

## 2022-04-07 RX ORDER — GABAPENTIN 100 MG/1
100 CAPSULE ORAL EVERY 6 HOURS SCHEDULED
Status: ACTIVE | OUTPATIENT
Start: 2022-04-07 | End: 2022-04-07

## 2022-04-07 RX ORDER — RIZATRIPTAN BENZOATE 10 MG/1
10 TABLET, ORALLY DISINTEGRATING ORAL DAILY PRN
Status: DISCONTINUED | OUTPATIENT
Start: 2022-04-07 | End: 2022-04-08 | Stop reason: HOSPADM

## 2022-04-07 RX ORDER — TIZANIDINE 4 MG/1
4 TABLET ORAL 3 TIMES DAILY
Status: DISCONTINUED | OUTPATIENT
Start: 2022-04-07 | End: 2022-04-08 | Stop reason: HOSPADM

## 2022-04-07 RX ORDER — ACETAMINOPHEN 500 MG
500 TABLET ORAL EVERY 6 HOURS SCHEDULED
Status: DISCONTINUED | OUTPATIENT
Start: 2022-04-07 | End: 2022-04-08 | Stop reason: HOSPADM

## 2022-04-07 RX ORDER — LIDOCAINE HYDROCHLORIDE 20 MG/ML
INJECTION, SOLUTION INFILTRATION; PERINEURAL PRN
Status: DISCONTINUED | OUTPATIENT
Start: 2022-04-07 | End: 2022-04-07

## 2022-04-07 RX ORDER — METOCLOPRAMIDE HYDROCHLORIDE 5 MG/ML
10 INJECTION INTRAMUSCULAR; INTRAVENOUS EVERY 6 HOURS PRN
Status: DISCONTINUED | OUTPATIENT
Start: 2022-04-07 | End: 2022-04-07 | Stop reason: SDUPTHER

## 2022-04-07 RX ORDER — HEPARIN SODIUM 5000 [USP'U]/ML
5000 INJECTION, SOLUTION INTRAVENOUS; SUBCUTANEOUS ONCE
Status: COMPLETED | OUTPATIENT
Start: 2022-04-07 | End: 2022-04-07

## 2022-04-07 RX ORDER — MIDAZOLAM HYDROCHLORIDE 1 MG/ML
INJECTION, SOLUTION INTRAMUSCULAR; INTRAVENOUS PRN
Status: DISCONTINUED | OUTPATIENT
Start: 2022-04-07 | End: 2022-04-07

## 2022-04-07 RX ORDER — NICOTINE POLACRILEX 4 MG
30 LOZENGE BUCCAL PRN
Status: DISCONTINUED | OUTPATIENT
Start: 2022-04-07 | End: 2022-04-08 | Stop reason: HOSPADM

## 2022-04-07 RX ORDER — POLYETHYLENE GLYCOL 3350 17 G/17G
17 POWDER, FOR SOLUTION ORAL DAILY
Status: DISCONTINUED | OUTPATIENT
Start: 2022-04-08 | End: 2022-04-08 | Stop reason: HOSPADM

## 2022-04-07 RX ORDER — PROCHLORPERAZINE EDISYLATE 5 MG/ML
5 INJECTION INTRAMUSCULAR; INTRAVENOUS EVERY 4 HOURS PRN
Status: DISCONTINUED | OUTPATIENT
Start: 2022-04-07 | End: 2022-04-07 | Stop reason: HOSPADM

## 2022-04-07 RX ORDER — SODIUM CHLORIDE, SODIUM LACTATE, POTASSIUM CHLORIDE, CALCIUM CHLORIDE 600; 310; 30; 20 MG/100ML; MG/100ML; MG/100ML; MG/100ML
INJECTION, SOLUTION INTRAVENOUS CONTINUOUS
Status: DISCONTINUED | OUTPATIENT
Start: 2022-04-07 | End: 2022-04-08

## 2022-04-07 RX ORDER — SCOLOPAMINE TRANSDERMAL SYSTEM 1 MG/1
1 PATCH, EXTENDED RELEASE TRANSDERMAL ONCE
Status: COMPLETED | OUTPATIENT
Start: 2022-04-07 | End: 2022-04-07

## 2022-04-07 RX ORDER — ONDANSETRON 2 MG/ML
INJECTION INTRAMUSCULAR; INTRAVENOUS PRN
Status: DISCONTINUED | OUTPATIENT
Start: 2022-04-07 | End: 2022-04-07

## 2022-04-07 RX ORDER — HYDROCODONE BITARTRATE AND ACETAMINOPHEN 5; 325 MG/1; MG/1
0.5 TABLET ORAL EVERY 4 HOURS PRN
Status: DISCONTINUED | OUTPATIENT
Start: 2022-04-07 | End: 2022-04-08 | Stop reason: HOSPADM

## 2022-04-07 RX ORDER — EPHEDRINE SULFATE/0.9% NACL/PF 50 MG/10ML
SYRINGE (ML) INTRAVENOUS PRN
Status: DISCONTINUED | OUTPATIENT
Start: 2022-04-07 | End: 2022-04-07

## 2022-04-07 RX ORDER — TIZANIDINE 2 MG/1
4 TABLET ORAL ONCE
Status: DISCONTINUED | OUTPATIENT
Start: 2022-04-07 | End: 2022-04-07 | Stop reason: SDUPTHER

## 2022-04-07 RX ORDER — ROCURONIUM BROMIDE 10 MG/ML
INJECTION, SOLUTION INTRAVENOUS PRN
Status: DISCONTINUED | OUTPATIENT
Start: 2022-04-07 | End: 2022-04-07

## 2022-04-07 RX ORDER — NICOTINE POLACRILEX 4 MG
15 LOZENGE BUCCAL PRN
Status: DISCONTINUED | OUTPATIENT
Start: 2022-04-07 | End: 2022-04-08 | Stop reason: HOSPADM

## 2022-04-07 RX ORDER — VENLAFAXINE HYDROCHLORIDE 75 MG/1
75 CAPSULE, EXTENDED RELEASE ORAL NIGHTLY
Status: DISCONTINUED | OUTPATIENT
Start: 2022-04-07 | End: 2022-04-08 | Stop reason: HOSPADM

## 2022-04-07 RX ORDER — DEXTROSE MONOHYDRATE 25 G/50ML
12.5 INJECTION, SOLUTION INTRAVENOUS PRN
Status: DISCONTINUED | OUTPATIENT
Start: 2022-04-07 | End: 2022-04-08 | Stop reason: HOSPADM

## 2022-04-07 RX ORDER — DIPHENHYDRAMINE HYDROCHLORIDE 50 MG/ML
25 INJECTION INTRAMUSCULAR; INTRAVENOUS
Status: DISCONTINUED | OUTPATIENT
Start: 2022-04-07 | End: 2022-04-07 | Stop reason: HOSPADM

## 2022-04-07 RX ORDER — ACETAMINOPHEN 500 MG
1000 TABLET ORAL
Status: DISCONTINUED | OUTPATIENT
Start: 2022-04-07 | End: 2022-04-07 | Stop reason: SDUPTHER

## 2022-04-07 RX ORDER — GABAPENTIN 300 MG/1
300 CAPSULE ORAL
Status: DISCONTINUED | OUTPATIENT
Start: 2022-04-07 | End: 2022-04-07 | Stop reason: SDUPTHER

## 2022-04-07 RX ORDER — ENALAPRILAT 1.25 MG/ML
1.25 INJECTION INTRAVENOUS EVERY 8 HOURS PRN
Status: DISCONTINUED | OUTPATIENT
Start: 2022-04-07 | End: 2022-04-08 | Stop reason: HOSPADM

## 2022-04-07 RX ORDER — SIMETHICONE 125 MG
TABLET,CHEWABLE ORAL
Status: DISPENSED
Start: 2022-04-07 | End: 2022-04-07

## 2022-04-07 RX ORDER — ENOXAPARIN SODIUM 100 MG/ML
60 INJECTION SUBCUTANEOUS EVERY 12 HOURS
Status: DISCONTINUED | OUTPATIENT
Start: 2022-04-07 | End: 2022-04-08 | Stop reason: HOSPADM

## 2022-04-07 RX ORDER — METOCLOPRAMIDE 10 MG/1
10 TABLET ORAL EVERY 6 HOURS PRN
Status: DISCONTINUED | OUTPATIENT
Start: 2022-04-07 | End: 2022-04-08 | Stop reason: HOSPADM

## 2022-04-07 RX ORDER — SODIUM CHLORIDE, SODIUM LACTATE, POTASSIUM CHLORIDE, CALCIUM CHLORIDE 600; 310; 30; 20 MG/100ML; MG/100ML; MG/100ML; MG/100ML
INJECTION, SOLUTION INTRAVENOUS CONTINUOUS PRN
Status: DISCONTINUED | OUTPATIENT
Start: 2022-04-07 | End: 2022-04-07

## 2022-04-07 RX ORDER — CHLORHEXIDINE GLUCONATE ORAL RINSE 1.2 MG/ML
15 SOLUTION DENTAL
Status: COMPLETED | OUTPATIENT
Start: 2022-04-07 | End: 2022-04-07

## 2022-04-07 RX ORDER — DEXTROSE MONOHYDRATE 25 G/50ML
25 INJECTION, SOLUTION INTRAVENOUS PRN
Status: DISCONTINUED | OUTPATIENT
Start: 2022-04-07 | End: 2022-04-08 | Stop reason: HOSPADM

## 2022-04-07 RX ORDER — PROPOFOL 10 MG/ML
INJECTION, EMULSION INTRAVENOUS PRN
Status: DISCONTINUED | OUTPATIENT
Start: 2022-04-07 | End: 2022-04-07

## 2022-04-07 RX ORDER — SODIUM CHLORIDE, SODIUM LACTATE, POTASSIUM CHLORIDE, CALCIUM CHLORIDE 600; 310; 30; 20 MG/100ML; MG/100ML; MG/100ML; MG/100ML
INJECTION, SOLUTION INTRAVENOUS CONTINUOUS
Status: DISCONTINUED | OUTPATIENT
Start: 2022-04-07 | End: 2022-04-07 | Stop reason: HOSPADM

## 2022-04-07 RX ORDER — HYDRALAZINE HYDROCHLORIDE 20 MG/ML
5 INJECTION INTRAMUSCULAR; INTRAVENOUS EVERY 10 MIN PRN
Status: DISCONTINUED | OUTPATIENT
Start: 2022-04-07 | End: 2022-04-07 | Stop reason: HOSPADM

## 2022-04-07 RX ORDER — ONDANSETRON 2 MG/ML
4 INJECTION INTRAMUSCULAR; INTRAVENOUS ONCE
Status: COMPLETED | OUTPATIENT
Start: 2022-04-07 | End: 2022-04-07

## 2022-04-07 RX ORDER — DIPHENHYDRAMINE HYDROCHLORIDE 50 MG/ML
25 INJECTION INTRAMUSCULAR; INTRAVENOUS EVERY 6 HOURS PRN
Status: DISCONTINUED | OUTPATIENT
Start: 2022-04-07 | End: 2022-04-08 | Stop reason: HOSPADM

## 2022-04-07 RX ORDER — DEXAMETHASONE SODIUM PHOSPHATE 4 MG/ML
INJECTION, SOLUTION INTRA-ARTICULAR; INTRALESIONAL; INTRAMUSCULAR; INTRAVENOUS; SOFT TISSUE PRN
Status: DISCONTINUED | OUTPATIENT
Start: 2022-04-07 | End: 2022-04-07

## 2022-04-07 RX ORDER — NEOSTIGMINE METHYLSULFATE 4 MG/4 ML
SYRINGE (ML) INTRAVENOUS PRN
Status: DISCONTINUED | OUTPATIENT
Start: 2022-04-07 | End: 2022-04-07

## 2022-04-07 RX ORDER — ONDANSETRON 2 MG/ML
INJECTION INTRAMUSCULAR; INTRAVENOUS
Status: DISPENSED
Start: 2022-04-07 | End: 2022-04-07

## 2022-04-07 RX ORDER — AMOXICILLIN 250 MG
2 CAPSULE ORAL 2 TIMES DAILY
Status: DISCONTINUED | OUTPATIENT
Start: 2022-04-07 | End: 2022-04-08 | Stop reason: HOSPADM

## 2022-04-07 RX ORDER — SIMETHICONE 125 MG
125 TABLET,CHEWABLE ORAL EVERY 4 HOURS PRN
Status: DISCONTINUED | OUTPATIENT
Start: 2022-04-07 | End: 2022-04-08 | Stop reason: HOSPADM

## 2022-04-07 RX ORDER — APREPITANT 40 MG/1
40 CAPSULE ORAL
Status: DISCONTINUED | OUTPATIENT
Start: 2022-04-07 | End: 2022-04-07 | Stop reason: SDUPTHER

## 2022-04-07 RX ORDER — 0.9 % SODIUM CHLORIDE 0.9 %
2 VIAL (ML) INJECTION EVERY 12 HOURS SCHEDULED
Status: DISCONTINUED | OUTPATIENT
Start: 2022-04-07 | End: 2022-04-07 | Stop reason: HOSPADM

## 2022-04-07 RX ORDER — METOCLOPRAMIDE HYDROCHLORIDE 5 MG/ML
10 INJECTION INTRAMUSCULAR; INTRAVENOUS EVERY 6 HOURS PRN
Status: DISCONTINUED | OUTPATIENT
Start: 2022-04-07 | End: 2022-04-08 | Stop reason: HOSPADM

## 2022-04-07 RX ADMIN — SODIUM CHLORIDE, POTASSIUM CHLORIDE, SODIUM LACTATE AND CALCIUM CHLORIDE: 600; 310; 30; 20 INJECTION, SOLUTION INTRAVENOUS at 12:13

## 2022-04-07 RX ADMIN — METOCLOPRAMIDE HYDROCHLORIDE 10 MG: 5 INJECTION INTRAMUSCULAR; INTRAVENOUS at 07:35

## 2022-04-07 RX ADMIN — FENTANYL CITRATE 25 MCG: 50 INJECTION INTRAMUSCULAR; INTRAVENOUS at 11:25

## 2022-04-07 RX ADMIN — MIDAZOLAM HYDROCHLORIDE 2 MG: 1 INJECTION, SOLUTION INTRAMUSCULAR; INTRAVENOUS at 07:18

## 2022-04-07 RX ADMIN — ONDANSETRON 4 MG: 2 INJECTION INTRAMUSCULAR; INTRAVENOUS at 10:34

## 2022-04-07 RX ADMIN — ROCURONIUM BROMIDE 30 MG: 10 INJECTION INTRAVENOUS at 08:22

## 2022-04-07 RX ADMIN — ACETAMINOPHEN 500 MG: 500 TABLET ORAL at 13:28

## 2022-04-07 RX ADMIN — EPHEDRINE SULFATE 25 MG: 5 INJECTION INTRAVENOUS at 08:14

## 2022-04-07 RX ADMIN — VENLAFAXINE HYDROCHLORIDE 75 MG: 75 CAPSULE, EXTENDED RELEASE ORAL at 21:05

## 2022-04-07 RX ADMIN — ONDANSETRON 4 MG: 2 INJECTION INTRAMUSCULAR; INTRAVENOUS at 11:05

## 2022-04-07 RX ADMIN — SODIUM CHLORIDE, POTASSIUM CHLORIDE, SODIUM LACTATE AND CALCIUM CHLORIDE: 600; 310; 30; 20 INJECTION, SOLUTION INTRAVENOUS at 07:17

## 2022-04-07 RX ADMIN — SODIUM CHLORIDE, POTASSIUM CHLORIDE, SODIUM LACTATE AND CALCIUM CHLORIDE: 600; 310; 30; 20 INJECTION, SOLUTION INTRAVENOUS at 05:56

## 2022-04-07 RX ADMIN — FENTANYL CITRATE 100 MCG: 50 INJECTION, SOLUTION INTRAMUSCULAR; INTRAVENOUS at 07:18

## 2022-04-07 RX ADMIN — ENOXAPARIN SODIUM 60 MG: 60 INJECTION SUBCUTANEOUS at 21:06

## 2022-04-07 RX ADMIN — TIZANIDINE 4 MG: 4 TABLET ORAL at 21:06

## 2022-04-07 RX ADMIN — SIMETHICONE 125 MG: 125 TABLET, CHEWABLE ORAL at 11:05

## 2022-04-07 RX ADMIN — DEXAMETHASONE SODIUM PHOSPHATE 4 MG: 4 INJECTION, SOLUTION INTRAMUSCULAR; INTRAVENOUS at 07:35

## 2022-04-07 RX ADMIN — SODIUM CHLORIDE, POTASSIUM CHLORIDE, SODIUM LACTATE AND CALCIUM CHLORIDE: 600; 310; 30; 20 INJECTION, SOLUTION INTRAVENOUS at 09:14

## 2022-04-07 RX ADMIN — Medication 5 MG: at 10:34

## 2022-04-07 RX ADMIN — EPHEDRINE SULFATE 25 MG: 5 INJECTION INTRAVENOUS at 08:19

## 2022-04-07 RX ADMIN — DOCUSATE SODIUM AND SENNOSIDES 2 TABLET: 8.6; 5 TABLET, FILM COATED ORAL at 13:28

## 2022-04-07 RX ADMIN — TIZANIDINE 4 MG: 4 TABLET ORAL at 14:50

## 2022-04-07 RX ADMIN — ACETAMINOPHEN 500 MG: 500 TABLET ORAL at 17:58

## 2022-04-07 RX ADMIN — DOCUSATE SODIUM AND SENNOSIDES 2 TABLET: 8.6; 5 TABLET, FILM COATED ORAL at 21:06

## 2022-04-07 RX ADMIN — ROCURONIUM BROMIDE 70 MG: 10 INJECTION INTRAVENOUS at 07:22

## 2022-04-07 RX ADMIN — PROPOFOL 320 MG: 10 INJECTION, EMULSION INTRAVENOUS at 07:22

## 2022-04-07 RX ADMIN — ERTAPENEM SODIUM 1 G: 1 INJECTION, POWDER, LYOPHILIZED, FOR SOLUTION INTRAMUSCULAR; INTRAVENOUS at 07:30

## 2022-04-07 RX ADMIN — LIDOCAINE HYDROCHLORIDE 60 MG: 20 INJECTION, SOLUTION INFILTRATION; PERINEURAL at 07:22

## 2022-04-07 RX ADMIN — SCOPOLAMINE 1 PATCH: 1 PATCH TRANSDERMAL at 05:44

## 2022-04-07 RX ADMIN — GLYCOPYRROLATE 0.8 MG: 0.2 INJECTION, SOLUTION INTRAMUSCULAR; INTRAVENOUS at 10:34

## 2022-04-07 RX ADMIN — CHLORHEXIDINE GLUCONATE 15 ML: 1.2 RINSE ORAL at 06:15

## 2022-04-07 RX ADMIN — HEPARIN SODIUM 5000 UNITS: 5000 INJECTION INTRAVENOUS; SUBCUTANEOUS at 06:30

## 2022-04-07 ASSESSMENT — PAIN DESCRIPTION - PAIN TYPE
TYPE: ACUTE PAIN

## 2022-04-07 ASSESSMENT — ENCOUNTER SYMPTOMS
CHEST TIGHTNESS: 0
DOUBLE VISION: 0
LIGHT-HEADEDNESS: 0
ABDOMINAL DISTENTION: 0
BLOOD IN STOOL: 0
PSYCHIATRIC NEGATIVE: 1
HEADACHES: 0
UNEXPECTED WEIGHT CHANGE: 0
ADENOPATHY: 0
CONSTIPATION: 0
EXERCISE TOLERANCE: GOOD (>4 METS)
CONFUSION: 0
SORE THROAT: 0
TINGLING: 0
DIARRHEA: 0
APNEA: 0
APPETITE CHANGE: 0
TROUBLE SWALLOWING: 0
FEVER: 0
ORTHOPNEA: 0
PHOTOPHOBIA: 0
CONSTITUTIONAL NEGATIVE: 1
COUGH: 0
VOMITING: 0
SLEEP DISTURBANCE: 0
DIAPHORESIS: 0
SPUTUM PRODUCTION: 0
HEMOPTYSIS: 0
DIZZINESS: 0
WHEEZING: 0
VOICE CHANGE: 0
EYES NEGATIVE: 1
FATIGUE: 0
SHORTNESS OF BREATH: 0
BRUISES/BLEEDS EASILY: 0
WEAKNESS: 0
ABDOMINAL PAIN: 1
HEADACHES: 1
BACK PAIN: 0
NAUSEA: 0
SPEECH DIFFICULTY: 0
CHILLS: 0
RESPIRATORY NEGATIVE: 1
CHOKING: 0
ACTIVITY CHANGE: 0
HEARTBURN: 0
FOCAL WEAKNESS: 0
BLURRED VISION: 0

## 2022-04-07 ASSESSMENT — PAIN SCALES - GENERAL
PAINLEVEL_OUTOF10: 7
PAINLEVEL_OUTOF10: 0
PAINLEVEL_OUTOF10: 5
PAINLEVEL_OUTOF10: 0
PAINLEVEL_OUTOF10: 8
PAINLEVEL_OUTOF10: 8
PAINLEVEL_OUTOF10: 7
PAINLEVEL_OUTOF10: 8
PAINLEVEL_OUTOF10: 0
PAINLEVEL_OUTOF10: 7

## 2022-04-08 VITALS
RESPIRATION RATE: 18 BRPM | BODY MASS INDEX: 59.07 KG/M2 | HEART RATE: 86 BPM | TEMPERATURE: 98.2 F | OXYGEN SATURATION: 95 % | WEIGHT: 293 LBS | SYSTOLIC BLOOD PRESSURE: 153 MMHG | HEIGHT: 59 IN | DIASTOLIC BLOOD PRESSURE: 78 MMHG

## 2022-04-08 LAB
ALBUMIN SERPL-MCNC: 3.3 G/DL (ref 3.6–5.1)
ALBUMIN/GLOB SERPL: 0.8 {RATIO} (ref 1–2.4)
ALP SERPL-CCNC: 66 UNITS/L (ref 45–117)
ALT SERPL-CCNC: 49 UNITS/L
ANION GAP SERPL CALC-SCNC: 9 MMOL/L (ref 10–20)
AST SERPL-CCNC: 33 UNITS/L
BASOPHILS # BLD: 0 K/MCL (ref 0–0.3)
BASOPHILS NFR BLD: 0 %
BILIRUB SERPL-MCNC: 0.7 MG/DL (ref 0.2–1)
BUN SERPL-MCNC: 11 MG/DL (ref 6–20)
BUN/CREAT SERPL: 14 (ref 7–25)
CALCIUM SERPL-MCNC: 9 MG/DL (ref 8.4–10.2)
CHLORIDE SERPL-SCNC: 104 MMOL/L (ref 98–107)
CO2 SERPL-SCNC: 29 MMOL/L (ref 21–32)
CREAT SERPL-MCNC: 0.79 MG/DL (ref 0.51–0.95)
DEPRECATED RDW RBC: 44.7 FL (ref 39–50)
EOSINOPHIL # BLD: 0 K/MCL (ref 0–0.5)
EOSINOPHIL NFR BLD: 0 %
ERYTHROCYTE [DISTWIDTH] IN BLOOD: 14.1 % (ref 11–15)
FASTING DURATION TIME PATIENT: ABNORMAL H
GFR SERPLBLD BASED ON 1.73 SQ M-ARVRAT: 90 ML/MIN
GLOBULIN SER-MCNC: 4.3 G/DL (ref 2–4)
GLUCOSE BLDC GLUCOMTR-MCNC: 109 MG/DL (ref 70–99)
GLUCOSE SERPL-MCNC: 102 MG/DL (ref 70–99)
HCT VFR BLD CALC: 43 % (ref 36–46.5)
HGB BLD-MCNC: 13.8 G/DL (ref 12–15.5)
IMM GRANULOCYTES # BLD AUTO: 0 K/MCL (ref 0–0.2)
IMM GRANULOCYTES # BLD: 0 %
LMWH PPP CHRO-ACNC: 0.47 UNITS/ML
LYMPHOCYTES # BLD: 1.7 K/MCL (ref 1–4.8)
LYMPHOCYTES NFR BLD: 21 %
MAGNESIUM SERPL-MCNC: 2.1 MG/DL (ref 1.7–2.4)
MCH RBC QN AUTO: 28 PG (ref 26–34)
MCHC RBC AUTO-ENTMCNC: 32.1 G/DL (ref 32–36.5)
MCV RBC AUTO: 87.2 FL (ref 78–100)
MONOCYTES # BLD: 0.6 K/MCL (ref 0.3–0.9)
MONOCYTES NFR BLD: 8 %
NEUTROPHILS # BLD: 5.8 K/MCL (ref 1.8–7.7)
NEUTROPHILS NFR BLD: 71 %
NRBC BLD MANUAL-RTO: 0 /100 WBC
PHOSPHATE SERPL-MCNC: 3.5 MG/DL (ref 2.4–4.7)
PLATELET # BLD AUTO: 253 K/MCL (ref 140–450)
POTASSIUM SERPL-SCNC: 3.5 MMOL/L (ref 3.4–5.1)
POTASSIUM SERPL-SCNC: 4 MMOL/L (ref 3.4–5.1)
PROT SERPL-MCNC: 7.6 G/DL (ref 6.4–8.2)
RAINBOW EXTRA TUBES HOLD SPECIMEN: NORMAL
RBC # BLD: 4.93 MIL/MCL (ref 4–5.2)
SODIUM SERPL-SCNC: 138 MMOL/L (ref 135–145)
WBC # BLD: 8.3 K/MCL (ref 4.2–11)

## 2022-04-08 PROCEDURE — 83735 ASSAY OF MAGNESIUM: CPT | Performed by: SURGERY

## 2022-04-08 PROCEDURE — 85520 HEPARIN ASSAY: CPT | Performed by: INTERNAL MEDICINE

## 2022-04-08 PROCEDURE — 85025 COMPLETE CBC W/AUTO DIFF WBC: CPT | Performed by: SURGERY

## 2022-04-08 PROCEDURE — 10004651 HB RX, NO CHARGE ITEM: Performed by: SURGERY

## 2022-04-08 PROCEDURE — 94660 CPAP INITIATION&MGMT: CPT

## 2022-04-08 PROCEDURE — 10002803 HB RX 637: Performed by: SURGERY

## 2022-04-08 PROCEDURE — 84132 ASSAY OF SERUM POTASSIUM: CPT | Performed by: SURGERY

## 2022-04-08 PROCEDURE — 80053 COMPREHEN METABOLIC PANEL: CPT | Performed by: SURGERY

## 2022-04-08 PROCEDURE — 36415 COLL VENOUS BLD VENIPUNCTURE: CPT | Performed by: SURGERY

## 2022-04-08 PROCEDURE — 10002800 HB RX 250 W HCPCS: Performed by: SURGERY

## 2022-04-08 PROCEDURE — 84100 ASSAY OF PHOSPHORUS: CPT | Performed by: SURGERY

## 2022-04-08 RX ORDER — AMOXICILLIN 250 MG
2 CAPSULE ORAL 2 TIMES DAILY
Status: SHIPPED | COMMUNITY
Start: 2022-04-08 | End: 2022-06-09

## 2022-04-08 RX ORDER — POLYETHYLENE GLYCOL 3350 17 G/17G
17 POWDER, FOR SOLUTION ORAL DAILY
Status: SHIPPED | COMMUNITY
Start: 2022-04-08 | End: 2022-06-09

## 2022-04-08 RX ORDER — SIMETHICONE 125 MG
125 TABLET,CHEWABLE ORAL EVERY 4 HOURS PRN
Status: SHIPPED | COMMUNITY
Start: 2022-04-08 | End: 2022-05-09 | Stop reason: ALTCHOICE

## 2022-04-08 RX ORDER — POTASSIUM CHLORIDE 20 MEQ/1
40 TABLET, EXTENDED RELEASE ORAL ONCE
Status: COMPLETED | OUTPATIENT
Start: 2022-04-08 | End: 2022-04-08

## 2022-04-08 RX ORDER — ENOXAPARIN SODIUM 100 MG/ML
60 INJECTION SUBCUTANEOUS EVERY 12 HOURS
Qty: 16.8 ML | Refills: 0 | INPATIENT
Start: 2022-04-08 | End: 2022-04-22

## 2022-04-08 RX ADMIN — POLYETHYLENE GLYCOL (3350) 17 G: 17 POWDER, FOR SOLUTION ORAL at 08:13

## 2022-04-08 RX ADMIN — ACETAMINOPHEN 500 MG: 500 TABLET ORAL at 00:10

## 2022-04-08 RX ADMIN — ACETAMINOPHEN 500 MG: 500 TABLET ORAL at 12:00

## 2022-04-08 RX ADMIN — DOCUSATE SODIUM AND SENNOSIDES 2 TABLET: 8.6; 5 TABLET, FILM COATED ORAL at 08:13

## 2022-04-08 RX ADMIN — ENOXAPARIN SODIUM 60 MG: 60 INJECTION SUBCUTANEOUS at 08:13

## 2022-04-08 RX ADMIN — POTASSIUM CHLORIDE 40 MEQ: 1500 TABLET, EXTENDED RELEASE ORAL at 12:00

## 2022-04-08 RX ADMIN — ACETAMINOPHEN 500 MG: 500 TABLET ORAL at 18:10

## 2022-04-08 RX ADMIN — ACETAMINOPHEN 500 MG: 500 TABLET ORAL at 05:46

## 2022-04-08 RX ADMIN — TIZANIDINE 4 MG: 4 TABLET ORAL at 08:13

## 2022-04-08 ASSESSMENT — PAIN SCALES - GENERAL: PAINLEVEL_OUTOF10: 0

## 2022-04-08 ASSESSMENT — COGNITIVE AND FUNCTIONAL STATUS - GENERAL
BECAUSE OF A PHYSICAL, MENTAL, OR EMOTIONAL CONDITION, DO YOU HAVE SERIOUS DIFFICULTY CONCENTRATING, REMEMBERING OR MAKING DECISIONS: NO
DO YOU HAVE DIFFICULTY DRESSING OR BATHING: NO
BECAUSE OF A PHYSICAL, MENTAL, OR EMOTIONAL CONDITION, DO YOU HAVE DIFFICULTY DOING ERRANDS ALONE: NO
DO YOU HAVE SERIOUS DIFFICULTY WALKING OR CLIMBING STAIRS: NO

## 2022-04-11 ENCOUNTER — TELEPHONE (OUTPATIENT)
Dept: BARIATRICS/WEIGHT MGMT | Age: 47
End: 2022-04-11

## 2022-04-12 ENCOUNTER — LAB SERVICES (OUTPATIENT)
Dept: LAB | Age: 47
End: 2022-04-12

## 2022-04-12 DIAGNOSIS — G43.909 MIGRAINE WITHOUT STATUS MIGRAINOSUS, NOT INTRACTABLE, UNSPECIFIED MIGRAINE TYPE: ICD-10-CM

## 2022-04-12 DIAGNOSIS — K44.9 HIATAL HERNIA WITH GASTROESOPHAGEAL REFLUX DISEASE AND ESOPHAGITIS: ICD-10-CM

## 2022-04-12 DIAGNOSIS — R63.5 WEIGHT GAIN: ICD-10-CM

## 2022-04-12 DIAGNOSIS — Z01.812 PRE-PROCEDURE LAB EXAM: ICD-10-CM

## 2022-04-12 DIAGNOSIS — E88.810 INSULIN RESISTANCE SYNDROME: ICD-10-CM

## 2022-04-12 DIAGNOSIS — R60.0 LOWER EXTREMITY EDEMA: ICD-10-CM

## 2022-04-12 DIAGNOSIS — R63.2 BINGE EATING: ICD-10-CM

## 2022-04-12 DIAGNOSIS — E66.01 MORBID OBESITY WITH BODY MASS INDEX OF 60.0-69.9 IN ADULT (CMD): ICD-10-CM

## 2022-04-12 DIAGNOSIS — K21.00 HIATAL HERNIA WITH GASTROESOPHAGEAL REFLUX DISEASE AND ESOPHAGITIS: ICD-10-CM

## 2022-04-12 DIAGNOSIS — G47.33 OSA ON CPAP: ICD-10-CM

## 2022-04-12 DIAGNOSIS — K66.0 ABDOMINAL ADHESIONS: ICD-10-CM

## 2022-04-12 DIAGNOSIS — K21.9 GASTROESOPHAGEAL REFLUX DISEASE, UNSPECIFIED WHETHER ESOPHAGITIS PRESENT: ICD-10-CM

## 2022-04-12 LAB
ALBUMIN SERPL-MCNC: 3.4 G/DL (ref 3.6–5.1)
ALBUMIN/GLOB SERPL: 0.8 {RATIO} (ref 1–2.4)
ALP SERPL-CCNC: 101 UNITS/L (ref 45–117)
ALT SERPL-CCNC: 66 UNITS/L
ANION GAP SERPL CALC-SCNC: 13 MMOL/L (ref 10–20)
AST SERPL-CCNC: 46 UNITS/L
BASOPHILS # BLD: 0 K/MCL (ref 0–0.3)
BASOPHILS NFR BLD: 0 %
BILIRUB SERPL-MCNC: 0.7 MG/DL (ref 0.2–1)
BUN SERPL-MCNC: 17 MG/DL (ref 6–20)
BUN/CREAT SERPL: 22 (ref 7–25)
CALCIUM SERPL-MCNC: 9.2 MG/DL (ref 8.4–10.2)
CHLORIDE SERPL-SCNC: 99 MMOL/L (ref 98–107)
CO2 SERPL-SCNC: 28 MMOL/L (ref 21–32)
CREAT SERPL-MCNC: 0.78 MG/DL (ref 0.51–0.95)
DEPRECATED RDW RBC: 43.4 FL (ref 39–50)
EOSINOPHIL # BLD: 0.2 K/MCL (ref 0–0.5)
EOSINOPHIL NFR BLD: 3 %
ERYTHROCYTE [DISTWIDTH] IN BLOOD: 13.4 % (ref 11–15)
FASTING DURATION TIME PATIENT: ABNORMAL H
GFR SERPLBLD BASED ON 1.73 SQ M-ARVRAT: >90 ML/MIN
GLOBULIN SER-MCNC: 4.3 G/DL (ref 2–4)
GLUCOSE SERPL-MCNC: 85 MG/DL (ref 70–99)
HCT VFR BLD CALC: 44.7 % (ref 36–46.5)
HGB BLD-MCNC: 14.1 G/DL (ref 12–15.5)
IMM GRANULOCYTES # BLD AUTO: 0 K/MCL (ref 0–0.2)
IMM GRANULOCYTES # BLD: 0 %
LYMPHOCYTES # BLD: 1.6 K/MCL (ref 1–4.8)
LYMPHOCYTES NFR BLD: 29 %
MCH RBC QN AUTO: 27.9 PG (ref 26–34)
MCHC RBC AUTO-ENTMCNC: 31.5 G/DL (ref 32–36.5)
MCV RBC AUTO: 88.3 FL (ref 78–100)
MONOCYTES # BLD: 0.5 K/MCL (ref 0.3–0.9)
MONOCYTES NFR BLD: 10 %
NEUTROPHILS # BLD: 3.1 K/MCL (ref 1.8–7.7)
NEUTROPHILS NFR BLD: 58 %
NRBC BLD MANUAL-RTO: 0 /100 WBC
PLATELET # BLD AUTO: 252 K/MCL (ref 140–450)
POTASSIUM SERPL-SCNC: 4 MMOL/L (ref 3.4–5.1)
PROT SERPL-MCNC: 7.7 G/DL (ref 6.4–8.2)
RBC # BLD: 5.06 MIL/MCL (ref 4–5.2)
SODIUM SERPL-SCNC: 136 MMOL/L (ref 135–145)
WBC # BLD: 5.3 K/MCL (ref 4.2–11)

## 2022-04-12 PROCEDURE — 85025 COMPLETE CBC W/AUTO DIFF WBC: CPT | Performed by: INTERNAL MEDICINE

## 2022-04-12 PROCEDURE — 36415 COLL VENOUS BLD VENIPUNCTURE: CPT | Performed by: INTERNAL MEDICINE

## 2022-04-12 PROCEDURE — 80053 COMPREHEN METABOLIC PANEL: CPT | Performed by: INTERNAL MEDICINE

## 2022-04-13 PROBLEM — Z98.84 S/P GASTRIC BYPASS: Status: ACTIVE | Noted: 2022-04-13

## 2022-04-14 ENCOUNTER — V-VISIT (OUTPATIENT)
Dept: BARIATRICS/WEIGHT MGMT | Age: 47
End: 2022-04-14

## 2022-04-14 DIAGNOSIS — K21.9 GASTROESOPHAGEAL REFLUX DISEASE, UNSPECIFIED WHETHER ESOPHAGITIS PRESENT: ICD-10-CM

## 2022-04-14 DIAGNOSIS — G47.33 OSA ON CPAP: ICD-10-CM

## 2022-04-14 DIAGNOSIS — Z98.84 S/P GASTRIC BYPASS: Primary | ICD-10-CM

## 2022-04-14 DIAGNOSIS — E88.810 INSULIN RESISTANCE SYNDROME: ICD-10-CM

## 2022-04-14 PROCEDURE — 99024 POSTOP FOLLOW-UP VISIT: CPT | Performed by: STUDENT IN AN ORGANIZED HEALTH CARE EDUCATION/TRAINING PROGRAM

## 2022-04-14 ASSESSMENT — ENCOUNTER SYMPTOMS
DIARRHEA: 1
FEVER: 0
CONSTIPATION: 0
CHILLS: 0
FATIGUE: 1
PSYCHIATRIC NEGATIVE: 1
RESPIRATORY NEGATIVE: 1
BLOOD IN STOOL: 0
ABDOMINAL PAIN: 0
ROS GI COMMENTS: NO GERD OR DYSPHAGIA
NAUSEA: 0
NEUROLOGICAL NEGATIVE: 1
VOMITING: 0

## 2022-04-14 ASSESSMENT — PATIENT HEALTH QUESTIONNAIRE - PHQ9
SUM OF ALL RESPONSES TO PHQ9 QUESTIONS 1 AND 2: 0
CLINICAL INTERPRETATION OF PHQ2 SCORE: NO FURTHER SCREENING NEEDED
1. LITTLE INTEREST OR PLEASURE IN DOING THINGS: NOT AT ALL
2. FEELING DOWN, DEPRESSED OR HOPELESS: NOT AT ALL
SUM OF ALL RESPONSES TO PHQ9 QUESTIONS 1 AND 2: 0

## 2022-04-19 ENCOUNTER — TELEPHONE (OUTPATIENT)
Dept: BARIATRICS/WEIGHT MGMT | Age: 47
End: 2022-04-19

## 2022-04-25 ENCOUNTER — TELEPHONE (OUTPATIENT)
Dept: NEUROLOGY | Facility: CLINIC | Age: 47
End: 2022-04-25

## 2022-04-25 ENCOUNTER — MED REC SCAN ONLY (OUTPATIENT)
Dept: INTERNAL MEDICINE CLINIC | Facility: CLINIC | Age: 47
End: 2022-04-25

## 2022-04-25 RX ORDER — FREMANEZUMAB-VFRM 225 MG/1.5ML
INJECTION SUBCUTANEOUS
Qty: 1.5 ML | Refills: 5 | OUTPATIENT
Start: 2022-04-25

## 2022-04-25 NOTE — TELEPHONE ENCOUNTER
Noted.    A detailed message has been left on the patients voicemail asking her to please contact the office. When the patient returns the call, please review with her what her preference may be so that we may contact the pharmacy. Thanks. Reviewed and electronically signed by:  CJ Muller,

## 2022-04-25 NOTE — TELEPHONE ENCOUNTER
Received a drug change request via fax from [facility] for Ajovy 225mg (1.5ml) PF syringe 1.5mL. I contacted the pharmacy, and reviewed if this request was correct as the PA was approved until 3/23/2023. The pharmacy stated that the Rx was sent as a prefilled syringe and all they have on hand is the auto injector. Message to Dr. James Tristan to please review and advise if the Rx can be filled using an auto injector instead of the prefilled syringe? Thanks,  Reviewed and electronically signed by:  CJ Muller,

## 2022-04-25 NOTE — TELEPHONE ENCOUNTER
Spoke to patient and reviewed information below. She states that she got a call from [facility] that medication was ready and is assuming that it is the autoinjector. She gives the injections to herself and feels comfortable using the autoinjector. Explained that if moving forward she does not like the autoinjection than we can let the pharmacy know to order pre-filled syringes. She was understanding and thankful for the call. Spoke to pharmacist who states that they ordered the pre-filled syringes and got it in time so they have this ready for patient.

## 2022-05-03 NOTE — PROGRESS NOTES
Dx: Plantar fasciitis of left foot (M72.2)  Calcaneal spur of left foot (M77.32)             Visit # 6  Fall Risk: standard     Scheduled Visits 8  Precautions: n/a   Insurance Authorized visits   6 [facility] MD visit: none scheduled  Evaluat 0 = swallows foods/liquids without difficulty

## 2022-05-04 ENCOUNTER — HOSPITAL ENCOUNTER (OUTPATIENT)
Age: 47
Discharge: HOME | End: 2022-05-04
Payer: MEDICAID

## 2022-05-04 VITALS
HEART RATE: 81 BPM | TEMPERATURE: 97 F | RESPIRATION RATE: 16 BRPM | DIASTOLIC BLOOD PRESSURE: 76 MMHG | SYSTOLIC BLOOD PRESSURE: 133 MMHG | OXYGEN SATURATION: 97 %

## 2022-05-04 DIAGNOSIS — J06.9 VIRAL URI WITH COUGH: Primary | ICD-10-CM

## 2022-05-04 LAB
S PYO AG THROAT QL: NEGATIVE
SARS-COV-2 RNA RESP QL NAA+PROBE: NOT DETECTED

## 2022-05-04 PROCEDURE — 87880 STREP A ASSAY W/OPTIC: CPT

## 2022-05-04 PROCEDURE — 99212 OFFICE O/P EST SF 10 MIN: CPT

## 2022-05-04 PROCEDURE — 99213 OFFICE O/P EST LOW 20 MIN: CPT

## 2022-05-09 ENCOUNTER — V-VISIT (OUTPATIENT)
Dept: BARIATRICS/WEIGHT MGMT | Age: 47
End: 2022-05-09

## 2022-05-09 DIAGNOSIS — G43.001 MIGRAINE WITHOUT AURA AND WITH STATUS MIGRAINOSUS, NOT INTRACTABLE: ICD-10-CM

## 2022-05-09 DIAGNOSIS — Z98.84 S/P GASTRIC BYPASS: ICD-10-CM

## 2022-05-09 DIAGNOSIS — E88.810 INSULIN RESISTANCE SYNDROME: ICD-10-CM

## 2022-05-09 DIAGNOSIS — K21.9 GASTROESOPHAGEAL REFLUX DISEASE WITHOUT ESOPHAGITIS: Primary | ICD-10-CM

## 2022-05-09 DIAGNOSIS — G47.33 OSA ON CPAP: ICD-10-CM

## 2022-05-09 PROCEDURE — 99024 POSTOP FOLLOW-UP VISIT: CPT | Performed by: NURSE PRACTITIONER

## 2022-05-09 ASSESSMENT — ENCOUNTER SYMPTOMS
DIARRHEA: 0
SHORTNESS OF BREATH: 0
SINUS PRESSURE: 1
ACTIVITY CHANGE: 0
NERVOUS/ANXIOUS: 0
COUGH: 0
APPETITE CHANGE: 0
SORE THROAT: 1
WEAKNESS: 0
AGITATION: 0
CHOKING: 0
ENDOCRINE NEGATIVE: 1
SLEEP DISTURBANCE: 1
STRIDOR: 0
ANAL BLEEDING: 0
BRUISES/BLEEDS EASILY: 0
VOMITING: 0
RECTAL PAIN: 0
DIAPHORESIS: 0
SPEECH DIFFICULTY: 0
ABDOMINAL PAIN: 0
EYES NEGATIVE: 1
ABDOMINAL DISTENTION: 0
BLOOD IN STOOL: 0
FEVER: 0
HALLUCINATIONS: 0
CHEST TIGHTNESS: 0
NUMBNESS: 0
NAUSEA: 1
FACIAL ASYMMETRY: 0
LIGHT-HEADEDNESS: 0
UNEXPECTED WEIGHT CHANGE: 0
FATIGUE: 1
APNEA: 1
WHEEZING: 0
BACK PAIN: 0
DIZZINESS: 0
CONFUSION: 0
CHILLS: 0
TREMORS: 0
HEADACHES: 0
CONSTIPATION: 1
SEIZURES: 0

## 2022-05-09 ASSESSMENT — PATIENT HEALTH QUESTIONNAIRE - PHQ9
SUM OF ALL RESPONSES TO PHQ9 QUESTIONS 1 AND 2: 0
1. LITTLE INTEREST OR PLEASURE IN DOING THINGS: NOT AT ALL
2. FEELING DOWN, DEPRESSED OR HOPELESS: NOT AT ALL
CLINICAL INTERPRETATION OF PHQ2 SCORE: NO FURTHER SCREENING NEEDED
SUM OF ALL RESPONSES TO PHQ9 QUESTIONS 1 AND 2: 0

## 2022-05-19 RX ORDER — VENLAFAXINE HYDROCHLORIDE 75 MG/1
CAPSULE, EXTENDED RELEASE ORAL
Qty: 90 CAPSULE | Refills: 1 | OUTPATIENT
Start: 2022-05-19

## 2022-05-20 ENCOUNTER — TELEPHONE (OUTPATIENT)
Dept: PULMONOLOGY | Facility: CLINIC | Age: 47
End: 2022-05-20

## 2022-06-09 ENCOUNTER — OFFICE VISIT (OUTPATIENT)
Dept: SLEEP MEDICINE | Age: 47
End: 2022-06-09

## 2022-06-09 VITALS
DIASTOLIC BLOOD PRESSURE: 80 MMHG | HEIGHT: 60 IN | SYSTOLIC BLOOD PRESSURE: 122 MMHG | HEART RATE: 78 BPM | BODY MASS INDEX: 56.54 KG/M2 | OXYGEN SATURATION: 98 % | WEIGHT: 288 LBS

## 2022-06-09 DIAGNOSIS — G47.33 OSA (OBSTRUCTIVE SLEEP APNEA): Primary | ICD-10-CM

## 2022-06-09 PROCEDURE — 99214 OFFICE O/P EST MOD 30 MIN: CPT | Performed by: NURSE PRACTITIONER

## 2022-06-09 PROCEDURE — 99211 OFF/OP EST MAY X REQ PHY/QHP: CPT | Performed by: NURSE PRACTITIONER

## 2022-06-09 ASSESSMENT — ENCOUNTER SYMPTOMS
SNORING: 0
DIARRHEA: 0
NAUSEA: 0
PARESTHESIAS: 0
FEVER: 0
PHOTOPHOBIA: 0
NUMBNESS: 0
SORE THROAT: 0
CONSTIPATION: 0
BACK PAIN: 0
SLEEP DISTURBANCES DUE TO BREATHING: 0
EXCESSIVE DAYTIME SLEEPINESS: 0
VOMITING: 0
WEIGHT LOSS: 0
CHILLS: 0
BLURRED VISION: 0
HEADACHES: 0

## 2022-06-13 ENCOUNTER — MED REC SCAN ONLY (OUTPATIENT)
Dept: INTERNAL MEDICINE CLINIC | Facility: CLINIC | Age: 47
End: 2022-06-13

## 2022-06-28 ENCOUNTER — HOSPITAL ENCOUNTER (OUTPATIENT)
Dept: CT IMAGING | Age: 47
Discharge: HOME | End: 2022-06-28
Attending: PHYSICIAN ASSISTANT
Payer: MEDICAID

## 2022-06-28 DIAGNOSIS — R91.1 PULMONARY NODULE: ICD-10-CM

## 2022-06-28 DIAGNOSIS — Z87.891 HISTORY OF TOBACCO ABUSE: ICD-10-CM

## 2022-06-28 PROCEDURE — 71250 CT THORAX DX C-: CPT | Performed by: PHYSICIAN ASSISTANT

## 2022-07-27 ENCOUNTER — PATIENT MESSAGE (OUTPATIENT)
Dept: OBGYN CLINIC | Facility: CLINIC | Age: 47
End: 2022-07-27

## 2022-07-29 NOTE — TELEPHONE ENCOUNTER
We can send message to Joy Wilson to address medications/questions, but patient needs to book annual before MD will address.      PSR- please assist to schedule annual.

## 2022-08-18 ENCOUNTER — OFFICE VISIT (OUTPATIENT)
Dept: INTERNAL MEDICINE CLINIC | Facility: CLINIC | Age: 47
End: 2022-08-18
Payer: MEDICAID

## 2022-08-18 ENCOUNTER — LAB ENCOUNTER (OUTPATIENT)
Dept: LAB | Age: 47
End: 2022-08-18
Attending: INTERNAL MEDICINE
Payer: MEDICAID

## 2022-08-18 VITALS
WEIGHT: 270 LBS | HEIGHT: 60 IN | BODY MASS INDEX: 53.01 KG/M2 | DIASTOLIC BLOOD PRESSURE: 78 MMHG | SYSTOLIC BLOOD PRESSURE: 114 MMHG | HEART RATE: 78 BPM

## 2022-08-18 DIAGNOSIS — Z12.4 SCREENING FOR CERVICAL CANCER: ICD-10-CM

## 2022-08-18 DIAGNOSIS — Z12.31 VISIT FOR SCREENING MAMMOGRAM: ICD-10-CM

## 2022-08-18 DIAGNOSIS — Z12.11 SCREENING FOR COLON CANCER: ICD-10-CM

## 2022-08-18 DIAGNOSIS — Z00.00 PHYSICAL EXAM: Primary | ICD-10-CM

## 2022-08-18 DIAGNOSIS — R21 RASH: ICD-10-CM

## 2022-08-18 DIAGNOSIS — Z00.00 PHYSICAL EXAM: ICD-10-CM

## 2022-08-18 LAB
ALBUMIN SERPL-MCNC: 3.6 G/DL (ref 3.4–5)
ALBUMIN/GLOB SERPL: 0.8 {RATIO} (ref 1–2)
ALP LIVER SERPL-CCNC: 105 U/L
ALT SERPL-CCNC: 32 U/L
ANION GAP SERPL CALC-SCNC: 4 MMOL/L (ref 0–18)
AST SERPL-CCNC: 18 U/L (ref 15–37)
BASOPHILS # BLD AUTO: 0.03 X10(3) UL (ref 0–0.2)
BASOPHILS NFR BLD AUTO: 0.6 %
BILIRUB SERPL-MCNC: 0.5 MG/DL (ref 0.1–2)
BILIRUB UR QL CFM: NEGATIVE
BUN BLD-MCNC: 23 MG/DL (ref 7–18)
BUN/CREAT SERPL: 28.8 (ref 10–20)
CALCIUM BLD-MCNC: 9.6 MG/DL (ref 8.5–10.1)
CHLORIDE SERPL-SCNC: 105 MMOL/L (ref 98–112)
CHOLEST SERPL-MCNC: 184 MG/DL (ref ?–200)
CO2 SERPL-SCNC: 31 MMOL/L (ref 21–32)
COLOR UR: YELLOW
CREAT BLD-MCNC: 0.8 MG/DL
DEPRECATED RDW RBC AUTO: 45.5 FL (ref 35.1–46.3)
EOSINOPHIL # BLD AUTO: 0.15 X10(3) UL (ref 0–0.7)
EOSINOPHIL NFR BLD AUTO: 3.1 %
ERYTHROCYTE [DISTWIDTH] IN BLOOD BY AUTOMATED COUNT: 13.8 % (ref 11–15)
EST. AVERAGE GLUCOSE BLD GHB EST-MCNC: 111 MG/DL (ref 68–126)
FASTING PATIENT LIPID ANSWER: YES
FASTING STATUS PATIENT QL REPORTED: YES
GFR SERPLBLD BASED ON 1.73 SQ M-ARVRAT: 91 ML/MIN/1.73M2 (ref 60–?)
GLOBULIN PLAS-MCNC: 4.3 G/DL (ref 2.8–4.4)
GLUCOSE BLD-MCNC: 99 MG/DL (ref 70–99)
GLUCOSE UR-MCNC: NEGATIVE MG/DL
HBA1C MFR BLD: 5.5 % (ref ?–5.7)
HCT VFR BLD AUTO: 47.6 %
HDLC SERPL-MCNC: 41 MG/DL (ref 40–59)
HGB BLD-MCNC: 14.7 G/DL
HGB UR QL STRIP.AUTO: NEGATIVE
IMM GRANULOCYTES # BLD AUTO: 0 X10(3) UL (ref 0–1)
IMM GRANULOCYTES NFR BLD: 0 %
LDLC SERPL CALC-MCNC: 128 MG/DL (ref ?–100)
LEUKOCYTE ESTERASE UR QL STRIP.AUTO: NEGATIVE
LYMPHOCYTES # BLD AUTO: 1.84 X10(3) UL (ref 1–4)
LYMPHOCYTES NFR BLD AUTO: 38.5 %
MCH RBC QN AUTO: 27.8 PG (ref 26–34)
MCHC RBC AUTO-ENTMCNC: 30.9 G/DL (ref 31–37)
MCV RBC AUTO: 90 FL
MONOCYTES # BLD AUTO: 0.34 X10(3) UL (ref 0.1–1)
MONOCYTES NFR BLD AUTO: 7.1 %
NEUTROPHILS # BLD AUTO: 2.42 X10 (3) UL (ref 1.5–7.7)
NEUTROPHILS # BLD AUTO: 2.42 X10(3) UL (ref 1.5–7.7)
NEUTROPHILS NFR BLD AUTO: 50.7 %
NITRITE UR QL STRIP.AUTO: NEGATIVE
NONHDLC SERPL-MCNC: 143 MG/DL (ref ?–130)
OSMOLALITY SERPL CALC.SUM OF ELEC: 294 MOSM/KG (ref 275–295)
PH UR: 5.5 [PH] (ref 5–8)
PLATELET # BLD AUTO: 242 10(3)UL (ref 150–450)
POTASSIUM SERPL-SCNC: 3.9 MMOL/L (ref 3.5–5.1)
PROT SERPL-MCNC: 7.9 G/DL (ref 6.4–8.2)
RBC # BLD AUTO: 5.29 X10(6)UL
SODIUM SERPL-SCNC: 140 MMOL/L (ref 136–145)
SP GR UR STRIP: >=1.03 (ref 1–1.03)
T4 FREE SERPL-MCNC: 0.9 NG/DL (ref 0.8–1.7)
TRIGL SERPL-MCNC: 81 MG/DL (ref 30–149)
TSI SER-ACNC: 2.99 MIU/ML (ref 0.36–3.74)
UROBILINOGEN UR STRIP-ACNC: 0.2
VIT D+METAB SERPL-MCNC: 51.6 NG/ML (ref 30–100)
VLDLC SERPL CALC-MCNC: 14 MG/DL (ref 0–30)
WBC # BLD AUTO: 4.8 X10(3) UL (ref 4–11)

## 2022-08-18 PROCEDURE — 81003 URINALYSIS AUTO W/O SCOPE: CPT | Performed by: INTERNAL MEDICINE

## 2022-08-18 PROCEDURE — 82306 VITAMIN D 25 HYDROXY: CPT

## 2022-08-18 PROCEDURE — 85025 COMPLETE CBC W/AUTO DIFF WBC: CPT | Performed by: INTERNAL MEDICINE

## 2022-08-18 PROCEDURE — 36415 COLL VENOUS BLD VENIPUNCTURE: CPT | Performed by: INTERNAL MEDICINE

## 2022-08-18 PROCEDURE — 83036 HEMOGLOBIN GLYCOSYLATED A1C: CPT | Performed by: INTERNAL MEDICINE

## 2022-08-18 PROCEDURE — 3074F SYST BP LT 130 MM HG: CPT | Performed by: INTERNAL MEDICINE

## 2022-08-18 PROCEDURE — 84443 ASSAY THYROID STIM HORMONE: CPT

## 2022-08-18 PROCEDURE — 99213 OFFICE O/P EST LOW 20 MIN: CPT | Performed by: INTERNAL MEDICINE

## 2022-08-18 PROCEDURE — 80061 LIPID PANEL: CPT | Performed by: INTERNAL MEDICINE

## 2022-08-18 PROCEDURE — 84439 ASSAY OF FREE THYROXINE: CPT

## 2022-08-18 PROCEDURE — 99396 PREV VISIT EST AGE 40-64: CPT | Performed by: INTERNAL MEDICINE

## 2022-08-18 PROCEDURE — 3078F DIAST BP <80 MM HG: CPT | Performed by: INTERNAL MEDICINE

## 2022-08-18 PROCEDURE — 80053 COMPREHEN METABOLIC PANEL: CPT | Performed by: INTERNAL MEDICINE

## 2022-08-18 PROCEDURE — 3008F BODY MASS INDEX DOCD: CPT | Performed by: INTERNAL MEDICINE

## 2022-08-18 RX ORDER — KETOCONAZOLE 20 MG/G
1 CREAM TOPICAL 2 TIMES DAILY
Qty: 75 G | Refills: 0 | Status: SHIPPED | OUTPATIENT
Start: 2022-08-18 | End: 2022-09-01

## 2022-08-18 RX ORDER — VENLAFAXINE 37.5 MG/1
37.5 TABLET ORAL 2 TIMES DAILY
Qty: 30 TABLET | Refills: 1 | Status: SHIPPED | OUTPATIENT
Start: 2022-08-18

## 2022-08-20 ENCOUNTER — HOSPITAL ENCOUNTER (OUTPATIENT)
Age: 47
Discharge: HOME | End: 2022-08-20
Attending: EMERGENCY MEDICINE
Payer: MEDICAID

## 2022-08-20 VITALS
WEIGHT: 270 LBS | HEIGHT: 59 IN | OXYGEN SATURATION: 100 % | HEART RATE: 70 BPM | BODY MASS INDEX: 54.43 KG/M2 | TEMPERATURE: 98 F | RESPIRATION RATE: 18 BRPM | SYSTOLIC BLOOD PRESSURE: 142 MMHG | DIASTOLIC BLOOD PRESSURE: 86 MMHG

## 2022-08-20 DIAGNOSIS — H00.012 HORDEOLUM EXTERNUM OF RIGHT LOWER EYELID: Primary | ICD-10-CM

## 2022-08-20 PROCEDURE — 99213 OFFICE O/P EST LOW 20 MIN: CPT

## 2022-08-20 RX ORDER — ERYTHROMYCIN 5 MG/G
1 OINTMENT OPHTHALMIC EVERY 6 HOURS
Qty: 1 G | Refills: 0 | Status: SHIPPED | OUTPATIENT
Start: 2022-08-20 | End: 2022-08-27

## 2022-08-20 NOTE — ED INITIAL ASSESSMENT (HPI)
Per pt started with right under eye soreness since yesterday, and this morning noticed swelling.  Denies any injury

## 2022-08-25 ENCOUNTER — HOSPITAL ENCOUNTER (OUTPATIENT)
Age: 47
Discharge: HOME | End: 2022-08-25
Payer: MEDICAID

## 2022-08-25 ENCOUNTER — TELEPHONE (OUTPATIENT)
Dept: INTERNAL MEDICINE CLINIC | Facility: CLINIC | Age: 47
End: 2022-08-25

## 2022-08-25 VITALS
HEIGHT: 60 IN | WEIGHT: 266 LBS | OXYGEN SATURATION: 97 % | DIASTOLIC BLOOD PRESSURE: 72 MMHG | TEMPERATURE: 97 F | RESPIRATION RATE: 18 BRPM | SYSTOLIC BLOOD PRESSURE: 137 MMHG | BODY MASS INDEX: 52.22 KG/M2 | HEART RATE: 82 BPM

## 2022-08-25 DIAGNOSIS — U07.1 COVID-19 VIRUS INFECTION: Primary | ICD-10-CM

## 2022-08-25 DIAGNOSIS — Z20.822 ENCOUNTER FOR LABORATORY TESTING FOR COVID-19 VIRUS: ICD-10-CM

## 2022-08-25 LAB — SARS-COV-2 RNA RESP QL NAA+PROBE: DETECTED

## 2022-08-25 PROCEDURE — 99213 OFFICE O/P EST LOW 20 MIN: CPT

## 2022-08-25 RX ORDER — BENZONATATE 100 MG/1
100 CAPSULE ORAL 3 TIMES DAILY PRN
Qty: 30 CAPSULE | Refills: 0 | Status: SHIPPED | OUTPATIENT
Start: 2022-08-25 | End: 2022-09-24

## 2022-08-25 RX ORDER — IBUPROFEN 600 MG/1
TABLET ORAL
Qty: 20 TABLET | Refills: 0 | Status: SHIPPED | OUTPATIENT
Start: 2022-08-25

## 2022-08-25 RX ORDER — VENLAFAXINE 100 MG/1
100 TABLET ORAL 2 TIMES DAILY
COMMUNITY
End: 2022-08-26

## 2022-08-25 RX ORDER — ALBUTEROL SULFATE 90 UG/1
2 AEROSOL, METERED RESPIRATORY (INHALATION) EVERY 4 HOURS PRN
Qty: 1 EACH | Refills: 0 | Status: SHIPPED | OUTPATIENT
Start: 2022-08-25 | End: 2022-09-24

## 2022-08-25 RX ORDER — NIRMATRELVIR AND RITONAVIR 300-100 MG
3 KIT ORAL 2 TIMES DAILY
Qty: 30 EACH | Refills: 0 | Status: SHIPPED | OUTPATIENT
Start: 2022-08-25 | End: 2022-08-30

## 2022-08-25 NOTE — TELEPHONE ENCOUNTER
Contacted patient regarding her lab results, and patient wanted to let Dr. Latanya Collins know she tested positive for covid today and has been prescribed medications which she has already started. Just and FYI for MMP.

## 2022-09-08 RX ORDER — FREMANEZUMAB-VFRM 225 MG/1.5ML
INJECTION SUBCUTANEOUS
Qty: 1.5 ML | Refills: 5 | Status: SHIPPED | OUTPATIENT
Start: 2022-09-08

## 2022-09-08 NOTE — TELEPHONE ENCOUNTER
Refill request for ajovy 225 mg/1.5 mL, inject 1.5 mL every 30 days, 1 pen, 5 refills    LOV: 3/29/22  NOV: None  Last refilled on 3/29 with 5 refills

## 2022-09-13 DIAGNOSIS — R21 RASH: ICD-10-CM

## 2022-09-13 RX ORDER — KETOCONAZOLE 20 MG/G
1 CREAM TOPICAL 2 TIMES DAILY
Qty: 75 G | Refills: 1 | Status: SHIPPED | OUTPATIENT
Start: 2022-09-13

## 2022-09-14 NOTE — TELEPHONE ENCOUNTER
LR 6-25-20 by Vince Basilio MD  pls advise, thanks in advance.          Please review refill protocol failed/ no protocol  Requested Prescriptions   Pending Prescriptions Disp Refills    KETOCONAZOLE 2 % External Cream [Pharmacy Med Name: KETOCONAZOLE 2% CREAM 15GM] 75 g 0     Sig: APPLY TOPICALLY TO THE AFFECTED AREA TWICE DAILY FOR 14 DAYS        There is no refill protocol information for this order

## 2022-09-15 DIAGNOSIS — B37.2 CANDIDAL INTERTRIGO: ICD-10-CM

## 2022-09-15 RX ORDER — NYSTATIN 100000 [USP'U]/G
1 POWDER TOPICAL 4 TIMES DAILY
Qty: 30 G | Refills: 1 | Status: SHIPPED | OUTPATIENT
Start: 2022-09-15

## 2022-09-15 RX ORDER — KETOCONAZOLE 20 MG/ML
SHAMPOO TOPICAL
Qty: 120 ML | Refills: 12 | OUTPATIENT
Start: 2022-09-15

## 2022-09-18 RX ORDER — VENLAFAXINE 37.5 MG/1
37.5 TABLET ORAL 2 TIMES DAILY
Qty: 90 TABLET | Refills: 1 | Status: SHIPPED | OUTPATIENT
Start: 2022-09-18 | End: 2023-07-05

## 2022-09-18 NOTE — TELEPHONE ENCOUNTER
Refill passed per [facility] protocol.     Requested Prescriptions   Pending Prescriptions Disp Refills    VENLAFAXINE 37.5 MG Oral Tab [Pharmacy Med Name: VENLAFAXINE 37.5MG TABLETS] 30 tablet 1     Sig: TAKE 1 TABLET(37.5 MG) BY MOUTH TWICE DAILY        Psychiatric Non-Scheduled (Anti-Anxiety) Passed - 9/16/2022  8:05 AM        Passed - In person appointment or virtual visit in the past 6 mos or appointment in next 3 mos       Recent Outpatient Visits              1 month ago Physical exam    [facility]    Office Visit    5 months ago Intractable migraine without aura and with status migrainosus    [address] [place] [address]    Office Visit    6 months ago Bilateral primary osteoarthritis of knee    [facility] Sl[facility]    Office Visit    6 months ago Preoperative clearance    Edel Kirk MD    Office Visit    6 months ago Tendinopathy of rotator cuff, unspecified laterality    [facility] [place] Swathi Rodney MD    Office Visit     Future Appointments         Provider Department Appt Notes    In 2 months Saint Sample, MD [facility], [place] Change of medication                     Recent Outpatient Visits              1 month ago Physical exam    Edel Kirk MD    Office Visit    5 months ago Intractable migraine without aura and with status migrainosus    [address] [place] [address]    Office Visit    6 months ago Bilateral primary osteoarthritis of knee    [facility]    Office Visit    6 months ago Preoperative clearance    Erlinda Pate MD    Office Visit    6 months ago Tendinopathy of rotator cuff, unspecified laterality    [facility] [place] Fabiano Orosco MD    Office Visit            Future Appointments         Provider Department Appt Notes    In 2 months Tiana Leon MD [facility], [address], [place] Change of medication

## 2022-09-19 RX ORDER — KETOCONAZOLE 20 MG/ML
SHAMPOO TOPICAL
Qty: 120 ML | Refills: 12 | OUTPATIENT
Start: 2022-09-19

## 2022-09-29 ENCOUNTER — TELEPHONE (OUTPATIENT)
Dept: OBGYN CLINIC | Facility: CLINIC | Age: 47
End: 2022-09-29

## 2022-09-29 ENCOUNTER — PATIENT MESSAGE (OUTPATIENT)
Dept: GASTROENTEROLOGY | Facility: CLINIC | Age: 47
End: 2022-09-29

## 2022-09-29 NOTE — TELEPHONE ENCOUNTER
Pt appt was rescheduled in July to December and rescheduled again till February , asking if DR can add her on

## 2022-09-29 NOTE — TELEPHONE ENCOUNTER
Last annual was 2/5/21. Pt rescheduled July appt to Dec, The office rescheduled her December appt to February. Asking to be added sooner.     To PARRISH

## 2022-10-07 ENCOUNTER — OFFICE VISIT (OUTPATIENT)
Dept: OBGYN CLINIC | Facility: CLINIC | Age: 47
End: 2022-10-07
Payer: MEDICAID

## 2022-10-07 VITALS
DIASTOLIC BLOOD PRESSURE: 85 MMHG | WEIGHT: 259.38 LBS | SYSTOLIC BLOOD PRESSURE: 120 MMHG | HEART RATE: 72 BPM | BODY MASS INDEX: 51 KG/M2

## 2022-10-07 DIAGNOSIS — Z01.419 ENCOUNTER FOR GYNECOLOGICAL EXAMINATION: Primary | ICD-10-CM

## 2022-10-07 PROCEDURE — 3074F SYST BP LT 130 MM HG: CPT | Performed by: OBSTETRICS & GYNECOLOGY

## 2022-10-07 PROCEDURE — 3079F DIAST BP 80-89 MM HG: CPT | Performed by: OBSTETRICS & GYNECOLOGY

## 2022-10-07 PROCEDURE — 99396 PREV VISIT EST AGE 40-64: CPT | Performed by: OBSTETRICS & GYNECOLOGY

## 2022-10-07 RX ORDER — VENLAFAXINE HYDROCHLORIDE 75 MG/1
75 CAPSULE, EXTENDED RELEASE ORAL DAILY
Qty: 90 CAPSULE | Refills: 3 | Status: SHIPPED | OUTPATIENT
Start: 2022-10-07

## 2022-10-31 ENCOUNTER — HOSPITAL ENCOUNTER (OUTPATIENT)
Dept: MAMMOGRAPHY | Age: 47
Discharge: HOME | End: 2022-10-31
Attending: INTERNAL MEDICINE
Payer: MEDICAID

## 2022-10-31 DIAGNOSIS — Z12.31 VISIT FOR SCREENING MAMMOGRAM: ICD-10-CM

## 2022-10-31 PROCEDURE — 77063 BREAST TOMOSYNTHESIS BI: CPT | Performed by: INTERNAL MEDICINE

## 2022-10-31 PROCEDURE — 77067 SCR MAMMO BI INCL CAD: CPT | Performed by: INTERNAL MEDICINE

## 2022-11-28 ENCOUNTER — HOSPITAL ENCOUNTER (OUTPATIENT)
Age: 47
Discharge: HOME | End: 2022-11-28
Payer: MEDICAID

## 2022-11-28 VITALS
TEMPERATURE: 98 F | SYSTOLIC BLOOD PRESSURE: 144 MMHG | RESPIRATION RATE: 18 BRPM | HEART RATE: 86 BPM | OXYGEN SATURATION: 98 % | DIASTOLIC BLOOD PRESSURE: 79 MMHG

## 2022-11-28 DIAGNOSIS — J01.90 ACUTE VIRAL SINUSITIS: Primary | ICD-10-CM

## 2022-11-28 DIAGNOSIS — B97.89 ACUTE VIRAL SINUSITIS: Primary | ICD-10-CM

## 2022-11-28 LAB
POCT INFLUENZA A: NEGATIVE
POCT INFLUENZA B: NEGATIVE
SARS-COV-2 RNA RESP QL NAA+PROBE: NOT DETECTED

## 2022-11-28 PROCEDURE — 99212 OFFICE O/P EST SF 10 MIN: CPT

## 2022-11-28 PROCEDURE — 99213 OFFICE O/P EST LOW 20 MIN: CPT

## 2022-11-28 PROCEDURE — 87502 INFLUENZA DNA AMP PROBE: CPT | Performed by: EMERGENCY MEDICINE

## 2022-11-28 NOTE — ED INITIAL ASSESSMENT (HPI)
PATIENT ARRIVED AMBULATORY TO ROOM C/O SYMPTOMS THAT STARTED 3 DAYS AGO. +SORE THROAT +PRODUCTIVE COUGH +NASAL CONGESTION. NO FEVERS. NO N/V/D. EASY NON LABORED RESPIRATIONS.

## 2022-11-28 NOTE — DISCHARGE INSTRUCTIONS
COVID and flu are both negative. Symptoms consistent with viral sinusitis. No antibiotics indicated at this time. Try over-the-counter medications for your symptoms. Flonase, Sudafed. Sinus irrigation. Saline nasal spray. Honey. Push fluids.   If there is no improvement you should follow-up with your primary doctor

## 2022-12-07 ENCOUNTER — TELEPHONE (OUTPATIENT)
Dept: INTERNAL MEDICINE CLINIC | Facility: CLINIC | Age: 47
End: 2022-12-07

## 2022-12-07 ENCOUNTER — TELEMEDICINE (OUTPATIENT)
Dept: INTERNAL MEDICINE CLINIC | Facility: CLINIC | Age: 47
End: 2022-12-07

## 2022-12-07 DIAGNOSIS — J06.9 VIRAL UPPER RESPIRATORY TRACT INFECTION: Primary | ICD-10-CM

## 2022-12-07 PROCEDURE — 99213 OFFICE O/P EST LOW 20 MIN: CPT | Performed by: NURSE PRACTITIONER

## 2022-12-07 RX ORDER — BENZONATATE 100 MG/1
100 CAPSULE ORAL 3 TIMES DAILY PRN
Qty: 20 CAPSULE | Refills: 0 | Status: SHIPPED
Start: 2022-12-07 | End: 2022-12-07

## 2022-12-07 RX ORDER — AZITHROMYCIN 250 MG/1
TABLET, FILM COATED ORAL
Qty: 6 TABLET | Refills: 0 | Status: SHIPPED | OUTPATIENT
Start: 2022-12-07 | End: 2022-12-12

## 2022-12-07 RX ORDER — AZITHROMYCIN 250 MG/1
TABLET, FILM COATED ORAL
Qty: 6 TABLET | Refills: 0 | Status: SHIPPED
Start: 2022-12-07 | End: 2022-12-07

## 2022-12-07 RX ORDER — BENZONATATE 100 MG/1
100 CAPSULE ORAL 3 TIMES DAILY PRN
Qty: 20 CAPSULE | Refills: 0 | Status: SHIPPED | OUTPATIENT
Start: 2022-12-07 | End: 2022-12-14

## 2022-12-07 NOTE — TELEPHONE ENCOUNTER
Verified name and . Patient was see in urgent care on 22 for sore throat, cough, green mucous production, and right ear pain. She states she continues to have symptoms and is requesting antibiotics.     Virtual visit scheduled:  Future Appointments   Date Time Provider Isaiah Ortiz   [facility]  [facility]   2023  6:30 PM Vinod Sadler MD [facility]

## 2022-12-07 NOTE — ASSESSMENT & PLAN NOTE
Follow-up on urgent care visit worsening sinus pain and pressure post viral upper respiratory infection. Negative for influenza AMB. Negative for COVID-19. Worsening chest congestion. Right ear pain increased. Mild scratchy sore throat. Patient with flu symptoms. Plan  Hydrate with fluids  Tylenol two tabs every six hours. . Not to exceed 4 grams in one day. Drink Hot tea with lemon  Drink Hot tea with honey  Gargle with warm salt water if you have a sore throat. Benzonatate 100 mg po three times per day as needed for cough.   Lonny Funes

## 2022-12-07 NOTE — TELEPHONE ENCOUNTER
Pt stated rx's was sent to [facility], just found out they are temporarily closed request to send to the one on [address]-re sent

## 2022-12-22 ENCOUNTER — V-VISIT (OUTPATIENT)
Dept: BARIATRICS/WEIGHT MGMT | Age: 47
End: 2022-12-22

## 2022-12-22 DIAGNOSIS — G43.001 MIGRAINE WITHOUT AURA AND WITH STATUS MIGRAINOSUS, NOT INTRACTABLE: ICD-10-CM

## 2022-12-22 DIAGNOSIS — Z98.84 S/P GASTRIC BYPASS: Primary | ICD-10-CM

## 2022-12-22 DIAGNOSIS — E55.9 VITAMIN D DEFICIENCY: ICD-10-CM

## 2022-12-22 DIAGNOSIS — G47.33 OSA ON CPAP: ICD-10-CM

## 2022-12-22 DIAGNOSIS — E88.810 INSULIN RESISTANCE SYNDROME: ICD-10-CM

## 2022-12-22 DIAGNOSIS — E66.01 MORBID OBESITY WITH BODY MASS INDEX OF 60.0-69.9 IN ADULT (CMD): ICD-10-CM

## 2022-12-22 PROCEDURE — 99214 OFFICE O/P EST MOD 30 MIN: CPT | Performed by: NURSE PRACTITIONER

## 2022-12-22 ASSESSMENT — ENCOUNTER SYMPTOMS
NUMBNESS: 0
BLOOD IN STOOL: 0
UNEXPECTED WEIGHT CHANGE: 0
BRUISES/BLEEDS EASILY: 0
APNEA: 1
ACTIVITY CHANGE: 0
CHEST TIGHTNESS: 0
FACIAL ASYMMETRY: 0
SLEEP DISTURBANCE: 1
EYE PAIN: 0
ANAL BLEEDING: 0
CHILLS: 0
TREMORS: 0
ABDOMINAL PAIN: 0
SPEECH DIFFICULTY: 0
BACK PAIN: 1
PHOTOPHOBIA: 0
HEADACHES: 1
RECTAL PAIN: 0
DIARRHEA: 0
LIGHT-HEADEDNESS: 0
NAUSEA: 0
DIZZINESS: 0
APPETITE CHANGE: 0
WEAKNESS: 0
ENDOCRINE NEGATIVE: 1
FEVER: 0
EYE ITCHING: 0
CONSTIPATION: 0
ABDOMINAL DISTENTION: 0
WHEEZING: 0
COUGH: 0
EYE REDNESS: 0
CONFUSION: 0
FATIGUE: 0
EYE DISCHARGE: 0
DIAPHORESIS: 0
SHORTNESS OF BREATH: 0
HALLUCINATIONS: 0
AGITATION: 0
CHOKING: 0
ADENOPATHY: 0
VOMITING: 0
NERVOUS/ANXIOUS: 0
STRIDOR: 0
SEIZURES: 0

## 2023-01-02 ENCOUNTER — LAB SERVICES (OUTPATIENT)
Dept: LAB | Age: 48
End: 2023-01-02

## 2023-01-02 DIAGNOSIS — Z98.84 S/P GASTRIC BYPASS: ICD-10-CM

## 2023-01-02 DIAGNOSIS — G43.001 MIGRAINE WITHOUT AURA AND WITH STATUS MIGRAINOSUS, NOT INTRACTABLE: ICD-10-CM

## 2023-01-02 DIAGNOSIS — E88.810 INSULIN RESISTANCE SYNDROME: ICD-10-CM

## 2023-01-02 DIAGNOSIS — G47.33 OSA ON CPAP: ICD-10-CM

## 2023-01-02 DIAGNOSIS — E55.9 VITAMIN D DEFICIENCY: ICD-10-CM

## 2023-01-02 LAB
25(OH)D3+25(OH)D2 SERPL-MCNC: 35.2 NG/ML (ref 30–100)
ALBUMIN SERPL-MCNC: 3.3 G/DL (ref 3.6–5.1)
ALBUMIN/GLOB SERPL: 0.8 {RATIO} (ref 1–2.4)
ALP SERPL-CCNC: 112 UNITS/L (ref 45–117)
ALT SERPL-CCNC: 31 UNITS/L
ANION GAP SERPL CALC-SCNC: 8 MMOL/L (ref 7–19)
AST SERPL-CCNC: 19 UNITS/L
BASOPHILS # BLD: 0 K/MCL (ref 0–0.3)
BASOPHILS NFR BLD: 1 %
BILIRUB SERPL-MCNC: 0.7 MG/DL (ref 0.2–1)
BUN SERPL-MCNC: 13 MG/DL (ref 6–20)
BUN/CREAT SERPL: 22 (ref 7–25)
CALCIUM SERPL-MCNC: 8.7 MG/DL (ref 8.4–10.2)
CHLORIDE SERPL-SCNC: 104 MMOL/L (ref 97–110)
CO2 SERPL-SCNC: 32 MMOL/L (ref 21–32)
CREAT SERPL-MCNC: 0.59 MG/DL (ref 0.51–0.95)
DEPRECATED RDW RBC: 43.4 FL (ref 39–50)
EOSINOPHIL # BLD: 0.1 K/MCL (ref 0–0.5)
EOSINOPHIL NFR BLD: 2 %
ERYTHROCYTE [DISTWIDTH] IN BLOOD: 12.9 % (ref 11–15)
FASTING DURATION TIME PATIENT: ABNORMAL H
FASTING DURATION TIME PATIENT: NORMAL H
FERRITIN SERPL-MCNC: 139 NG/ML (ref 8–252)
FOLATE SERPL-MCNC: 17.8 NG/ML
GFR SERPLBLD BASED ON 1.73 SQ M-ARVRAT: >90 ML/MIN
GLOBULIN SER-MCNC: 4.2 G/DL (ref 2–4)
GLUCOSE SERPL-MCNC: 94 MG/DL (ref 70–99)
HCT VFR BLD CALC: 44.2 % (ref 36–46.5)
HGB BLD-MCNC: 13.8 G/DL (ref 12–15.5)
IMM GRANULOCYTES # BLD AUTO: 0 K/MCL (ref 0–0.2)
IMM GRANULOCYTES # BLD: 0 %
INSULIN P FAST SERPL-ACNC: 10 MUNITS/L (ref 3–28)
IRON SATN MFR SERPL: 17 % (ref 15–45)
IRON SERPL-MCNC: 51 MCG/DL (ref 50–170)
LYMPHOCYTES # BLD: 1.9 K/MCL (ref 1–4.8)
LYMPHOCYTES NFR BLD: 32 %
MCH RBC QN AUTO: 28.5 PG (ref 26–34)
MCHC RBC AUTO-ENTMCNC: 31.2 G/DL (ref 32–36.5)
MCV RBC AUTO: 91.1 FL (ref 78–100)
MONOCYTES # BLD: 0.4 K/MCL (ref 0.3–0.9)
MONOCYTES NFR BLD: 6 %
NEUTROPHILS # BLD: 3.5 K/MCL (ref 1.8–7.7)
NEUTROPHILS NFR BLD: 59 %
NRBC BLD MANUAL-RTO: 0 /100 WBC
PHOSPHATE SERPL-MCNC: 3.7 MG/DL (ref 2.4–4.7)
PLATELET # BLD AUTO: 218 K/MCL (ref 140–450)
POTASSIUM SERPL-SCNC: 3.7 MMOL/L (ref 3.4–5.1)
PROT SERPL-MCNC: 7.5 G/DL (ref 6.4–8.2)
PTH-INTACT SERPL-MCNC: 91 PG/ML (ref 19–88)
RBC # BLD: 4.85 MIL/MCL (ref 4–5.2)
SODIUM SERPL-SCNC: 140 MMOL/L (ref 135–145)
TIBC SERPL-MCNC: 295 MCG/DL (ref 250–450)
VIT B12 SERPL-MCNC: 1288 PG/ML (ref 211–911)
WBC # BLD: 6 K/MCL (ref 4.2–11)

## 2023-01-02 PROCEDURE — 82728 ASSAY OF FERRITIN: CPT | Performed by: INTERNAL MEDICINE

## 2023-01-02 PROCEDURE — 83970 ASSAY OF PARATHORMONE: CPT | Performed by: INTERNAL MEDICINE

## 2023-01-02 PROCEDURE — 36415 COLL VENOUS BLD VENIPUNCTURE: CPT | Performed by: INTERNAL MEDICINE

## 2023-01-02 PROCEDURE — 84425 ASSAY OF VITAMIN B-1: CPT | Performed by: INTERNAL MEDICINE

## 2023-01-02 PROCEDURE — 82607 VITAMIN B-12: CPT | Performed by: INTERNAL MEDICINE

## 2023-01-02 PROCEDURE — 82746 ASSAY OF FOLIC ACID SERUM: CPT | Performed by: INTERNAL MEDICINE

## 2023-01-02 PROCEDURE — 85025 COMPLETE CBC W/AUTO DIFF WBC: CPT | Performed by: INTERNAL MEDICINE

## 2023-01-02 PROCEDURE — 82306 VITAMIN D 25 HYDROXY: CPT | Performed by: INTERNAL MEDICINE

## 2023-01-02 PROCEDURE — 84100 ASSAY OF PHOSPHORUS: CPT | Performed by: INTERNAL MEDICINE

## 2023-01-02 PROCEDURE — 83540 ASSAY OF IRON: CPT | Performed by: INTERNAL MEDICINE

## 2023-01-02 PROCEDURE — 83525 ASSAY OF INSULIN: CPT | Performed by: INTERNAL MEDICINE

## 2023-01-02 PROCEDURE — 80053 COMPREHEN METABOLIC PANEL: CPT | Performed by: INTERNAL MEDICINE

## 2023-01-02 PROCEDURE — 83550 IRON BINDING TEST: CPT | Performed by: INTERNAL MEDICINE

## 2023-01-07 LAB — VIT B1 PYROPHOSHATE BLD-SCNC: 116 NMOL/L (ref 70–180)

## 2023-03-07 RX ORDER — FREMANEZUMAB-VFRM 225 MG/1.5ML
225 INJECTION SUBCUTANEOUS
Qty: 1.5 ML | Refills: 0 | Status: SHIPPED | OUTPATIENT
Start: 2023-03-07

## 2023-03-07 NOTE — TELEPHONE ENCOUNTER
The patient is requesting a refill on: AJOVY 225 MG/1.5ML - INJECT 225 MG( 1.5 ML) UNDER THE SKIN EVERY 28 DAYS    Last OV: 3/29/22  Next OV: None  Last refilled: 9/8/22 with 5 refills

## 2023-03-21 ENCOUNTER — V-VISIT (OUTPATIENT)
Dept: BARIATRICS/WEIGHT MGMT | Age: 48
End: 2023-03-21

## 2023-03-21 DIAGNOSIS — E88.810 INSULIN RESISTANCE SYNDROME: ICD-10-CM

## 2023-03-21 DIAGNOSIS — Z98.84 S/P GASTRIC BYPASS: Primary | ICD-10-CM

## 2023-03-21 DIAGNOSIS — F06.4 ANXIETY DISORDER DUE TO KNOWN PHYSIOLOGICAL CONDITION: ICD-10-CM

## 2023-03-21 DIAGNOSIS — E55.9 VITAMIN D DEFICIENCY: ICD-10-CM

## 2023-03-21 DIAGNOSIS — G47.33 OSA ON CPAP: ICD-10-CM

## 2023-03-21 DIAGNOSIS — G43.001 MIGRAINE WITHOUT AURA AND WITH STATUS MIGRAINOSUS, NOT INTRACTABLE: ICD-10-CM

## 2023-03-21 PROCEDURE — 99214 OFFICE O/P EST MOD 30 MIN: CPT | Performed by: NURSE PRACTITIONER

## 2023-03-21 ASSESSMENT — ENCOUNTER SYMPTOMS
TREMORS: 0
SPEECH DIFFICULTY: 0
APNEA: 1
LIGHT-HEADEDNESS: 0
VOMITING: 0
FEVER: 0
ANAL BLEEDING: 0
AGITATION: 0
ACTIVITY CHANGE: 0
RECTAL PAIN: 0
CONFUSION: 0
UNEXPECTED WEIGHT CHANGE: 0
CONSTIPATION: 0
ABDOMINAL DISTENTION: 0
NUMBNESS: 0
ABDOMINAL PAIN: 0
BLOOD IN STOOL: 0
SHORTNESS OF BREATH: 0
STRIDOR: 0
NERVOUS/ANXIOUS: 0
CHOKING: 0
ADENOPATHY: 0
SEIZURES: 0
FACIAL ASYMMETRY: 0
BACK PAIN: 0
ENDOCRINE NEGATIVE: 1
COUGH: 0
CHEST TIGHTNESS: 0
HALLUCINATIONS: 0
EYES NEGATIVE: 1
FATIGUE: 0
NAUSEA: 0
CHILLS: 0
SLEEP DISTURBANCE: 1
DIARRHEA: 0
DIAPHORESIS: 0
WHEEZING: 0
HEADACHES: 0
APPETITE CHANGE: 0
WEAKNESS: 0
BRUISES/BLEEDS EASILY: 0
DIZZINESS: 0

## 2023-04-06 ENCOUNTER — HOSPITAL ENCOUNTER (OUTPATIENT)
Age: 48
Discharge: HOME | End: 2023-04-06
Payer: MEDICAID

## 2023-04-06 VITALS
SYSTOLIC BLOOD PRESSURE: 134 MMHG | DIASTOLIC BLOOD PRESSURE: 90 MMHG | RESPIRATION RATE: 18 BRPM | HEART RATE: 74 BPM | OXYGEN SATURATION: 99 % | TEMPERATURE: 98 F

## 2023-04-06 DIAGNOSIS — J02.0 STREP PHARYNGITIS: Primary | ICD-10-CM

## 2023-04-06 LAB
S PYO AG THROAT QL IA.RAPID: POSITIVE
SARS-COV-2 RNA RESP QL NAA+PROBE: NOT DETECTED

## 2023-04-06 PROCEDURE — 87651 STREP A DNA AMP PROBE: CPT | Performed by: PHYSICIAN ASSISTANT

## 2023-04-06 PROCEDURE — 99214 OFFICE O/P EST MOD 30 MIN: CPT

## 2023-04-06 PROCEDURE — 99213 OFFICE O/P EST LOW 20 MIN: CPT

## 2023-04-06 RX ORDER — AMOXICILLIN 500 MG/1
500 TABLET, FILM COATED ORAL 2 TIMES DAILY
Qty: 20 TABLET | Refills: 0 | Status: SHIPPED | OUTPATIENT
Start: 2023-04-06 | End: 2023-04-16

## 2023-04-06 NOTE — DISCHARGE INSTRUCTIONS
You have strep throat. I will treat with antibiotics, twice a day for 10 days. Please change her toothbrush after 1 full day of antibiotics. Tylenol every 4 hours and Motrin every 6 hours as needed for fever or discomfort. Drink plenty of water and stay well-hydrated. I would avoid foods that are very hot or very cold in temperature. Avoid foods that are fatty, fried, spicy. Avoid citrus and acidic foods and beverages. Go to ER if you have any new or worsening throat pain that is unilateral, difficulty swallowing her own secretions, change of voice, excessive drooling, stridor.

## 2023-04-17 ENCOUNTER — TELEPHONE (OUTPATIENT)
Dept: NEUROLOGY | Facility: CLINIC | Age: 48
End: 2023-04-17

## 2023-04-17 NOTE — TELEPHONE ENCOUNTER
Received PA approval for Ajovy 225mg/1.5mL soln prefilled syringe up to 3 syringes per 84 days.  Medication approved from 1/17/23-4/17/24    [id number]    Letter placed into scanning bin

## 2023-04-22 DIAGNOSIS — G43.011 INTRACTABLE MIGRAINE WITHOUT AURA AND WITH STATUS MIGRAINOSUS: Primary | ICD-10-CM

## 2023-04-24 RX ORDER — FREMANEZUMAB-VFRM 225 MG/1.5ML
INJECTION SUBCUTANEOUS
Qty: 1.5 ML | Refills: 0 | Status: SHIPPED | OUTPATIENT
Start: 2023-04-24

## 2023-04-24 NOTE — TELEPHONE ENCOUNTER
Refill Request    Medication:  Ajovy 225 MG/1.5ML Subcutaneous Solution Prefilled Syringe (Fremanezumab-vfrm)  Sig - Route: Inject 225 mg into the skin every 28 days.  Office visit needed for additional refills - Subcutaneous    CPB:91/96/2085  NOV:none    Last refill/[place]:03/07/2023

## 2023-05-17 DIAGNOSIS — G43.011 INTRACTABLE MIGRAINE WITHOUT AURA AND WITH STATUS MIGRAINOSUS: ICD-10-CM

## 2023-05-17 RX ORDER — FREMANEZUMAB-VFRM 225 MG/1.5ML
INJECTION SUBCUTANEOUS
Qty: 1.5 ML | Refills: 0 | OUTPATIENT
Start: 2023-05-17

## 2023-05-17 NOTE — TELEPHONE ENCOUNTER
Medication:AJOVY 225 MG/1.5ML Subcutaneous Solution Prefilled Syringe, INJECT 1.5ML(225MG) INTO THE SKIN EVERY 28 DAYS    LOV:3/29/22  NOV:none    Last refill/[place]MP:4/24/23 (1.5ml/0rf)

## 2023-06-08 ENCOUNTER — APPOINTMENT (OUTPATIENT)
Dept: SLEEP MEDICINE | Age: 48
End: 2023-06-08

## 2023-06-29 ENCOUNTER — TELEPHONE (OUTPATIENT)
Dept: BARIATRICS/WEIGHT MGMT | Age: 48
End: 2023-06-29

## 2023-07-05 ENCOUNTER — LAB SERVICES (OUTPATIENT)
Dept: LAB | Age: 48
End: 2023-07-05

## 2023-07-05 DIAGNOSIS — E55.9 VITAMIN D DEFICIENCY: ICD-10-CM

## 2023-07-05 DIAGNOSIS — Z98.84 S/P GASTRIC BYPASS: ICD-10-CM

## 2023-07-05 DIAGNOSIS — G43.001 MIGRAINE WITHOUT AURA AND WITH STATUS MIGRAINOSUS, NOT INTRACTABLE: ICD-10-CM

## 2023-07-05 DIAGNOSIS — E88.810 INSULIN RESISTANCE SYNDROME: ICD-10-CM

## 2023-07-05 DIAGNOSIS — F06.4 ANXIETY DISORDER DUE TO KNOWN PHYSIOLOGICAL CONDITION: ICD-10-CM

## 2023-07-05 DIAGNOSIS — G47.33 OSA ON CPAP: ICD-10-CM

## 2023-07-05 LAB
25(OH)D3+25(OH)D2 SERPL-MCNC: 41.4 NG/ML (ref 30–100)
ALBUMIN SERPL-MCNC: 3.3 G/DL (ref 3.6–5.1)
ALBUMIN/GLOB SERPL: 0.9 {RATIO} (ref 1–2.4)
ALP SERPL-CCNC: 115 UNITS/L (ref 45–117)
ALT SERPL-CCNC: 31 UNITS/L
ANION GAP SERPL CALC-SCNC: 6 MMOL/L (ref 7–19)
AST SERPL-CCNC: 18 UNITS/L
BASOPHILS # BLD: 0 K/MCL (ref 0–0.3)
BASOPHILS NFR BLD: 1 %
BILIRUB SERPL-MCNC: 0.8 MG/DL (ref 0.2–1)
BUN SERPL-MCNC: 17 MG/DL (ref 6–20)
BUN/CREAT SERPL: 32 (ref 7–25)
CALCIUM SERPL-MCNC: 8.9 MG/DL (ref 8.4–10.2)
CHLORIDE SERPL-SCNC: 107 MMOL/L (ref 97–110)
CO2 SERPL-SCNC: 33 MMOL/L (ref 21–32)
CREAT SERPL-MCNC: 0.53 MG/DL (ref 0.51–0.95)
DEPRECATED RDW RBC: 43.3 FL (ref 39–50)
EOSINOPHIL # BLD: 0.2 K/MCL (ref 0–0.5)
EOSINOPHIL NFR BLD: 3 %
ERYTHROCYTE [DISTWIDTH] IN BLOOD: 13.2 % (ref 11–15)
FASTING DURATION TIME PATIENT: 10 HOURS (ref 0–999)
FERRITIN SERPL-MCNC: 166 NG/ML (ref 8–252)
FOLATE SERPL-MCNC: >24 NG/ML
GFR SERPLBLD BASED ON 1.73 SQ M-ARVRAT: >90 ML/MIN
GLOBULIN SER-MCNC: 3.8 G/DL (ref 2–4)
GLUCOSE SERPL-MCNC: 87 MG/DL (ref 70–99)
HBA1C MFR BLD: 5.3 % (ref 4.5–5.6)
HCT VFR BLD CALC: 42 % (ref 36–46.5)
HGB BLD-MCNC: 13.4 G/DL (ref 12–15.5)
IMM GRANULOCYTES # BLD AUTO: 0 K/MCL (ref 0–0.2)
IMM GRANULOCYTES # BLD: 0 %
IRON SATN MFR SERPL: 30 % (ref 15–45)
IRON SERPL-MCNC: 88 MCG/DL (ref 50–170)
LYMPHOCYTES # BLD: 2 K/MCL (ref 1–4.8)
LYMPHOCYTES NFR BLD: 35 %
MCH RBC QN AUTO: 28.5 PG (ref 26–34)
MCHC RBC AUTO-ENTMCNC: 31.9 G/DL (ref 32–36.5)
MCV RBC AUTO: 89.2 FL (ref 78–100)
MONOCYTES # BLD: 0.3 K/MCL (ref 0.3–0.9)
MONOCYTES NFR BLD: 5 %
NEUTROPHILS # BLD: 3.3 K/MCL (ref 1.8–7.7)
NEUTROPHILS NFR BLD: 56 %
NRBC BLD MANUAL-RTO: 0 /100 WBC
PHOSPHATE SERPL-MCNC: 3.8 MG/DL (ref 2.4–4.7)
PLATELET # BLD AUTO: 222 K/MCL (ref 140–450)
POTASSIUM SERPL-SCNC: 4.3 MMOL/L (ref 3.4–5.1)
PROT SERPL-MCNC: 7.1 G/DL (ref 6.4–8.2)
RBC # BLD: 4.71 MIL/MCL (ref 4–5.2)
SODIUM SERPL-SCNC: 142 MMOL/L (ref 135–145)
TIBC SERPL-MCNC: 294 MCG/DL (ref 250–450)
VIT B12 SERPL-MCNC: 585 PG/ML (ref 211–911)
WBC # BLD: 5.7 K/MCL (ref 4.2–11)

## 2023-07-05 PROCEDURE — 82728 ASSAY OF FERRITIN: CPT | Performed by: INTERNAL MEDICINE

## 2023-07-05 PROCEDURE — 82306 VITAMIN D 25 HYDROXY: CPT | Performed by: INTERNAL MEDICINE

## 2023-07-05 PROCEDURE — 36415 COLL VENOUS BLD VENIPUNCTURE: CPT | Performed by: NURSE PRACTITIONER

## 2023-07-05 PROCEDURE — 82607 VITAMIN B-12: CPT | Performed by: INTERNAL MEDICINE

## 2023-07-05 PROCEDURE — 83540 ASSAY OF IRON: CPT | Performed by: INTERNAL MEDICINE

## 2023-07-05 PROCEDURE — 83970 ASSAY OF PARATHORMONE: CPT | Performed by: INTERNAL MEDICINE

## 2023-07-05 PROCEDURE — 83550 IRON BINDING TEST: CPT | Performed by: INTERNAL MEDICINE

## 2023-07-05 PROCEDURE — 85025 COMPLETE CBC W/AUTO DIFF WBC: CPT | Performed by: INTERNAL MEDICINE

## 2023-07-05 PROCEDURE — 82746 ASSAY OF FOLIC ACID SERUM: CPT | Performed by: INTERNAL MEDICINE

## 2023-07-05 PROCEDURE — 83525 ASSAY OF INSULIN: CPT | Performed by: INTERNAL MEDICINE

## 2023-07-05 PROCEDURE — 80053 COMPREHEN METABOLIC PANEL: CPT | Performed by: INTERNAL MEDICINE

## 2023-07-05 PROCEDURE — 84425 ASSAY OF VITAMIN B-1: CPT | Performed by: INTERNAL MEDICINE

## 2023-07-05 PROCEDURE — 83036 HEMOGLOBIN GLYCOSYLATED A1C: CPT | Performed by: INTERNAL MEDICINE

## 2023-07-05 PROCEDURE — 84100 ASSAY OF PHOSPHORUS: CPT | Performed by: INTERNAL MEDICINE

## 2023-07-05 RX ORDER — VENLAFAXINE HYDROCHLORIDE 75 MG/1
CAPSULE, EXTENDED RELEASE ORAL
Qty: 90 CAPSULE | Refills: 3 | Status: SHIPPED | OUTPATIENT
Start: 2023-07-05 | End: 2023-07-05 | Stop reason: CLARIF

## 2023-07-05 NOTE — TELEPHONE ENCOUNTER
Last annual 10/7/22 with PARRISH. Rx for Paxil must be authorized py PCP. Pt informed via [employer].

## 2023-07-06 LAB
FASTING DURATION TIME PATIENT: NORMAL H
INSULIN P FAST SERPL-ACNC: 7 MUNITS/L (ref 3–28)
PTH-INTACT SERPL-MCNC: 71 PG/ML (ref 19–88)

## 2023-07-09 LAB — VIT B1 PYROPHOSHATE BLD-SCNC: 136 NMOL/L (ref 70–180)

## 2023-07-12 ENCOUNTER — V-VISIT (OUTPATIENT)
Dept: BARIATRICS/WEIGHT MGMT | Age: 48
End: 2023-07-12

## 2023-07-12 DIAGNOSIS — G47.33 OSA ON CPAP: ICD-10-CM

## 2023-07-12 DIAGNOSIS — G43.001 MIGRAINE WITHOUT AURA AND WITH STATUS MIGRAINOSUS, NOT INTRACTABLE: ICD-10-CM

## 2023-07-12 DIAGNOSIS — E55.9 VITAMIN D DEFICIENCY: ICD-10-CM

## 2023-07-12 DIAGNOSIS — Z98.84 S/P GASTRIC BYPASS: Primary | ICD-10-CM

## 2023-07-12 DIAGNOSIS — F06.4 ANXIETY DISORDER DUE TO KNOWN PHYSIOLOGICAL CONDITION: ICD-10-CM

## 2023-07-12 DIAGNOSIS — E88.810 INSULIN RESISTANCE SYNDROME: ICD-10-CM

## 2023-07-12 DIAGNOSIS — K21.9 GASTROESOPHAGEAL REFLUX DISEASE WITHOUT ESOPHAGITIS: ICD-10-CM

## 2023-07-12 PROCEDURE — 99214 OFFICE O/P EST MOD 30 MIN: CPT | Performed by: NURSE PRACTITIONER

## 2023-07-12 RX ORDER — ACETAMINOPHEN 160 MG
TABLET,DISINTEGRATING ORAL DAILY
COMMUNITY

## 2023-07-12 ASSESSMENT — ENCOUNTER SYMPTOMS
BLOOD IN STOOL: 0
ADENOPATHY: 0
SHORTNESS OF BREATH: 0
CONSTIPATION: 0
CHILLS: 0
CHOKING: 0
CONFUSION: 0
FATIGUE: 0
ENDOCRINE NEGATIVE: 1
ABDOMINAL DISTENTION: 0
LIGHT-HEADEDNESS: 1
SEIZURES: 0
FEVER: 0
WHEEZING: 0
NAUSEA: 0
EYES NEGATIVE: 1
NUMBNESS: 0
CHEST TIGHTNESS: 0
SLEEP DISTURBANCE: 1
BACK PAIN: 0
ANAL BLEEDING: 0
BRUISES/BLEEDS EASILY: 0
DIAPHORESIS: 0
APNEA: 0
WEAKNESS: 0
STRIDOR: 0
COUGH: 0
DIZZINESS: 0
UNEXPECTED WEIGHT CHANGE: 0
VOMITING: 0
HEADACHES: 0
ABDOMINAL PAIN: 0
RECTAL PAIN: 0
DIARRHEA: 0
SPEECH DIFFICULTY: 0
HALLUCINATIONS: 0
TREMORS: 0
APPETITE CHANGE: 0
ACTIVITY CHANGE: 0
AGITATION: 0
NERVOUS/ANXIOUS: 0
FACIAL ASYMMETRY: 0

## 2023-07-25 ENCOUNTER — HOSPITAL ENCOUNTER (OUTPATIENT)
Dept: GENERAL RADIOLOGY | Facility: HOSPITAL | Age: 48
Discharge: HOME | End: 2023-07-25
Attending: INTERNAL MEDICINE
Payer: MEDICAID

## 2023-07-25 ENCOUNTER — HOSPITAL ENCOUNTER (EMERGENCY)
Facility: HOSPITAL | Age: 48
Discharge: HOME | End: 2023-07-25
Attending: EMERGENCY MEDICINE
Payer: MEDICAID

## 2023-07-25 ENCOUNTER — OFFICE VISIT (OUTPATIENT)
Dept: INTERNAL MEDICINE CLINIC | Facility: CLINIC | Age: 48
End: 2023-07-25

## 2023-07-25 VITALS
BODY MASS INDEX: 45.16 KG/M2 | DIASTOLIC BLOOD PRESSURE: 75 MMHG | WEIGHT: 224 LBS | SYSTOLIC BLOOD PRESSURE: 115 MMHG | HEART RATE: 71 BPM | HEIGHT: 59 IN

## 2023-07-25 VITALS
DIASTOLIC BLOOD PRESSURE: 87 MMHG | TEMPERATURE: 98 F | BODY MASS INDEX: 45.16 KG/M2 | RESPIRATION RATE: 18 BRPM | WEIGHT: 224 LBS | HEIGHT: 59 IN | HEART RATE: 65 BPM | SYSTOLIC BLOOD PRESSURE: 129 MMHG | OXYGEN SATURATION: 99 %

## 2023-07-25 DIAGNOSIS — M79.672 FOOT PAIN, LEFT: Primary | ICD-10-CM

## 2023-07-25 DIAGNOSIS — Z12.11 SCREENING FOR COLON CANCER: ICD-10-CM

## 2023-07-25 DIAGNOSIS — M79.672 FOOT PAIN, LEFT: ICD-10-CM

## 2023-07-25 DIAGNOSIS — M17.0 PRIMARY OSTEOARTHRITIS OF BOTH KNEES: ICD-10-CM

## 2023-07-25 DIAGNOSIS — M67.449 GANGLION CYST OF FINGER: ICD-10-CM

## 2023-07-25 DIAGNOSIS — K14.6 TONGUE PAIN: Primary | ICD-10-CM

## 2023-07-25 PROCEDURE — 3074F SYST BP LT 130 MM HG: CPT | Performed by: INTERNAL MEDICINE

## 2023-07-25 PROCEDURE — 99213 OFFICE O/P EST LOW 20 MIN: CPT | Performed by: INTERNAL MEDICINE

## 2023-07-25 PROCEDURE — 99283 EMERGENCY DEPT VISIT LOW MDM: CPT

## 2023-07-25 PROCEDURE — 73630 X-RAY EXAM OF FOOT: CPT | Performed by: INTERNAL MEDICINE

## 2023-07-25 PROCEDURE — 3078F DIAST BP <80 MM HG: CPT | Performed by: INTERNAL MEDICINE

## 2023-07-25 PROCEDURE — 3008F BODY MASS INDEX DOCD: CPT | Performed by: INTERNAL MEDICINE

## 2023-07-25 RX ORDER — ACETAMINOPHEN 500 MG
1000 TABLET ORAL ONCE
Status: COMPLETED | OUTPATIENT
Start: 2023-07-25 | End: 2023-07-25

## 2023-07-25 RX ORDER — KETOCONAZOLE 20 MG/ML
SHAMPOO TOPICAL
COMMUNITY

## 2023-07-26 NOTE — ED INITIAL ASSESSMENT (HPI)
Pt presents to ED for a \"weird taste\" in her mouth after drinking a soft drink that she usually always drinks. Pt states the bottle was sealed but believes something \"weird was in it\"  Pt states she now has a headache, and feels fatigue. Pt states her tongue feels \"funny\".  Denies n/v.

## 2023-07-26 NOTE — DISCHARGE INSTRUCTIONS
The cause of your tongue issue and chemical taste from the bottle is not known at this time. You could call the store that sold the product to report this as well as the public health department. Please follow-up with your doctor in 1 to 2 days for reevaluation. Return to the ER for changes or worsening or any concerning symptoms. Drink plenty of fluids. Tylenol for mild headaches.

## 2023-07-30 PROBLEM — J06.9 VIRAL UPPER RESPIRATORY TRACT INFECTION: Status: RESOLVED | Noted: 2022-12-07 | Resolved: 2023-07-30

## 2023-08-25 ENCOUNTER — OFFICE VISIT (OUTPATIENT)
Dept: PODIATRY CLINIC | Facility: CLINIC | Age: 48
End: 2023-08-25

## 2023-08-25 VITALS — SYSTOLIC BLOOD PRESSURE: 123 MMHG | HEART RATE: 69 BPM | DIASTOLIC BLOOD PRESSURE: 85 MMHG

## 2023-08-25 DIAGNOSIS — M72.2 PLANTAR FASCIITIS, LEFT: Primary | ICD-10-CM

## 2023-08-25 RX ORDER — DEXAMETHASONE SODIUM PHOSPHATE 4 MG/ML
4 VIAL (ML) INJECTION ONCE
Status: COMPLETED | OUTPATIENT
Start: 2023-08-25 | End: 2023-08-25

## 2023-08-25 RX ORDER — TRIAMCINOLONE ACETONIDE 40 MG/ML
40 INJECTION, SUSPENSION INTRA-ARTICULAR; INTRAMUSCULAR ONCE
Status: COMPLETED | OUTPATIENT
Start: 2023-08-25 | End: 2023-08-25

## 2023-08-25 RX ADMIN — TRIAMCINOLONE ACETONIDE 40 MG: 40 INJECTION, SUSPENSION INTRA-ARTICULAR; INTRAMUSCULAR at 09:10:00

## 2023-08-25 RX ADMIN — DEXAMETHASONE SODIUM PHOSPHATE 4 MG: 4 MG/ML VIAL (ML) INJECTION at 09:09:00

## 2023-08-25 NOTE — PROGRESS NOTES
[facility] Podiatry  Progress Note      Navid England is a 50year old female. Patient presents with:  Heel Pain: Left - onset a couple of months ago - has x-rays in the system - no injury - rates pain as 10-5/10 at all the time - has pain all over her foot             HPI:       Patient is a pleasant 40-year-old female who presents to clinic today for evaluation of left heel pain. She admits to chronic left heel pain. Admits to having cortisone shots in the past that have helped temporarily. Patient advised to prolonged walking and standing due to her job. Denies any pedal injuries or trauma. .  Allergies: Sulfa Antibiotics    Current Outpatient Medications   Medication Sig Dispense Refill    ketoconazole 2 % External Shampoo Apply topically twice a week. AJOVY 225 MG/1.5ML Subcutaneous Solution Prefilled Syringe INJECT 1.5ML(225MG) INTO THE SKIN EVERY 28 DAYS 1.5 mL 0    Nystatin 876111 UNIT/GM External Powder Apply 1 Application topically 4 (four) times daily. Apply to affected areas 30 g 1    ketoconazole 2 % External Cream Apply 1 Application topically 2 (two) times daily. 75 g 1    TURMERIC OR Take by mouth. Collagenase Does not apply Powder by Does not apply route. Almotriptan Malate 12.5 MG Oral Tab use at onset; may repeat once after 4 hours- ONLY 2 IN 24 HOUR PERIOD MAX. This is a 30 day supply. 8 tablet 2    Cholecalciferol (VITAMIN D) 50 MCG (2000 UT) Oral Cap Take by mouth. Mometasone Furoate 0.1 % External Cream Apply 1 Application topically daily. Apply a thin film to the affected area(s). 45 g 1    hydrocortisone 1 % External Cream Apply topically 2 (two) times daily. Multiple Vitamins-Minerals (MULTI-VITAMIN/MINERALS) Oral Tab Take 1 tablet by mouth daily.         Past Medical History:   Diagnosis Date    Anxiety     Chicken pox     Depression     Extrinsic asthma, unspecified     H/O seasonal allergies     Hyperopia with presbyopia of both eyes 11/23/2021 Migraines     Morbid obesity with BMI of 50.0-59.9, adult (HCC)     ANJELICA on CPAP     Ovarian cyst     Sleep apnea     Varicose vein       Past Surgical History:   Procedure Laterality Date      2013    CYST ASPIRATION RIGHT      LAPAROSCOPY, GASTRIC RESTRICTIVE PROCEDURE, WITH GASTRIC BYPASS FOR MORBID  2022    MYOMECTOMY 5/> INTRAMURAL MYOMAS &/OR TOTAL WT >250 GMS, ABDOMINAL APPROACH      SKIN SURGERY  2017    R breast sebaceous cyst      Family History   Problem Relation Age of Onset    Hypertension Father     Lipids Father         HYPERLIPIDEMIA    Diabetes Father     Psychiatric Mother     Cataracts Mother     Macular degeneration Neg     Glaucoma Neg     Retinal detachment Neg       Social History    Socioeconomic History      Marital status:       Number of children: 1    Occupational History      Occupation:       Occupation: [employer]    Tobacco Use      Smoking status: Former        Packs/day: 0.00        Years: 20.00        Pack years: 0        Types: Cigarettes        Quit date: 2013        Years since quitting: 10.6        Passive exposure: Past      Smokeless tobacco: Never    Vaping Use      Vaping Use: Never used    Substance and Sexual Activity      Alcohol use: Not Currently        Alcohol/week: 0.0 standard drinks of alcohol        Comment: Not frequently      Drug use: No      Sexual activity: Not Currently        Partners: Male    Other Topics      Concerns:        Caffeine Concern: Yes          1-2 diet soda daily, 1 cup of coffee daily        Sleep Concern: Yes          wakes several times        Exercise: No        Pt has a pacemaker: No        Pt has a defibrillator: No        Reaction to local anesthetic: No          REVIEW OF SYSTEMS:     Denies nause, fever, chills  No calf pain  Denies chest pain or SOB      EXAM:   LMP 2023 (Approximate)   GENERAL: well developed, well nourished, in no apparent distress  EXTREMITIES:   1. Integument: Normal skin temperature and turgor  2. Vascular: Dorsalis pedis two out of four bilateral and posterior tibial pulses two out of   four bilateral, capillary refill normal.   3. Musculoskeletal: All muscle groups are graded 5 out of 5 in the foot and ankle. Pain with palpation to left medial calcaneal tubercle   4. Neurological: Normal sharp dull sensation; reflexes normal.             ASSESSMENT AND PLAN:   Diagnoses and all orders for this visit:    Plantar fasciitis, left        Plan:     -Patient examined, chart history reviewed. -Discussed etiology of patient's condition and various treatment options.  -Discussed importance of proper shoe gear and orthotics. Patient to avoid walking barefoot at all times.  -Discussed importance of stretching exercises. Patient to aim to stretch 3-5 times daily.  -Discussed steroid injection with patient including benefits and risks. Patient agrees to injection and written consent was obtained. -After prepping site with alcohol, administered steroid injection to left plantar heel consisting of 1 cc of 1% lidocaine plain, 1 cc of dexamethasone, and  1cc of Kenalog 40. Patient tolerated the injection well and there were no complications. Site was dressed with Band-Aid.  -Wear supportive shoe gear and inserts at all times with ambulation. Avoid walking barefoot. - Perform stretching exercises 3-5 times daily. Instructions dispensed. - Monitor response to steroid injection.  - Follow-up in 2 months for reevaluation. The patient indicates understanding of these issues and agrees to the plan.         Dhaval Lester DPM

## 2023-08-25 NOTE — PROGRESS NOTES
Per verbal order from Dr Dominique Lisa, draw up 1 ml Dexamethasone Sodium, 1 ml Lidocaine 1% and 1 ml Kenalog 40 mg for left heel. Sonja Eaton

## 2023-08-26 DIAGNOSIS — G43.011 INTRACTABLE MIGRAINE WITHOUT AURA AND WITH STATUS MIGRAINOSUS: ICD-10-CM

## 2023-08-29 RX ORDER — ALMOTRIPTAN 12.5 MG/1
TABLET, FILM COATED ORAL
Qty: 8 TABLET | Refills: 0 | Status: SHIPPED | OUTPATIENT
Start: 2023-08-29

## 2023-08-29 RX ORDER — FREMANEZUMAB-VFRM 225 MG/1.5ML
1.5 INJECTION SUBCUTANEOUS
Qty: 1.5 ML | Refills: 0 | Status: SHIPPED | OUTPATIENT
Start: 2023-08-29

## 2023-08-31 DIAGNOSIS — G43.011 INTRACTABLE MIGRAINE WITHOUT AURA AND WITH STATUS MIGRAINOSUS: ICD-10-CM

## 2023-08-31 RX ORDER — FREMANEZUMAB-VFRM 225 MG/1.5ML
1.5 INJECTION SUBCUTANEOUS
Qty: 1.5 ML | Refills: 0 | OUTPATIENT
Start: 2023-08-31

## 2023-09-13 ENCOUNTER — OFFICE VISIT (OUTPATIENT)
Dept: OBGYN CLINIC | Facility: CLINIC | Age: 48
End: 2023-09-13

## 2023-09-13 VITALS
WEIGHT: 218 LBS | DIASTOLIC BLOOD PRESSURE: 85 MMHG | SYSTOLIC BLOOD PRESSURE: 122 MMHG | BODY MASS INDEX: 44 KG/M2 | HEART RATE: 77 BPM

## 2023-09-13 DIAGNOSIS — N95.1 PERIMENOPAUSAL VASOMOTOR SYMPTOMS: Primary | ICD-10-CM

## 2023-09-13 PROCEDURE — 3074F SYST BP LT 130 MM HG: CPT | Performed by: OBSTETRICS & GYNECOLOGY

## 2023-09-13 PROCEDURE — 99212 OFFICE O/P EST SF 10 MIN: CPT | Performed by: OBSTETRICS & GYNECOLOGY

## 2023-09-13 PROCEDURE — 3079F DIAST BP 80-89 MM HG: CPT | Performed by: OBSTETRICS & GYNECOLOGY

## 2023-09-13 RX ORDER — VENLAFAXINE HYDROCHLORIDE 75 MG/1
75 CAPSULE, EXTENDED RELEASE ORAL DAILY
Qty: 90 CAPSULE | Refills: 0 | Status: SHIPPED | OUTPATIENT
Start: 2023-09-13

## 2023-09-13 RX ORDER — VENLAFAXINE HYDROCHLORIDE 75 MG/1
75 CAPSULE, EXTENDED RELEASE ORAL DAILY
COMMUNITY
End: 2023-09-13

## 2023-10-03 DIAGNOSIS — G43.011 INTRACTABLE MIGRAINE WITHOUT AURA AND WITH STATUS MIGRAINOSUS: ICD-10-CM

## 2023-10-03 RX ORDER — KETOCONAZOLE 20 MG/ML
SHAMPOO TOPICAL
Refills: 0 | OUTPATIENT
Start: 2023-10-05

## 2023-10-04 RX ORDER — FREMANEZUMAB-VFRM 225 MG/1.5ML
1.5 INJECTION SUBCUTANEOUS
Qty: 1.5 ML | Refills: 1 | Status: SHIPPED | OUTPATIENT
Start: 2023-10-04 | End: 2023-10-04

## 2023-10-04 RX ORDER — FREMANEZUMAB-VFRM 225 MG/1.5ML
1.5 INJECTION SUBCUTANEOUS
Qty: 1.5 ML | Refills: 3 | Status: SHIPPED | OUTPATIENT
Start: 2023-10-04

## 2023-10-04 NOTE — TELEPHONE ENCOUNTER
Requested Prescriptions     Pending Prescriptions Disp Refills    Fremanezumab-vfrm (AJOVY) 225 MG/1.5ML Subcutaneous Solution Prefilled Syringe 1.5 mL 0     Sig: Inject 1.5 mL into the skin every 30 (thirty) days. Last OV: 3/9/22  Next OV: 11/1/23  Last refilled: 9/27/23    Her last 2 appointments were cancelled by R[facility]ALL.

## 2023-10-10 ENCOUNTER — ORDER TRANSCRIPTION (OUTPATIENT)
Dept: ADMINISTRATIVE | Facility: HOSPITAL | Age: 48
End: 2023-10-10

## 2023-10-10 DIAGNOSIS — Z12.31 ENCOUNTER FOR SCREENING MAMMOGRAM FOR MALIGNANT NEOPLASM OF BREAST: Primary | ICD-10-CM

## 2023-10-13 ENCOUNTER — TELEPHONE (OUTPATIENT)
Dept: OBGYN CLINIC | Facility: CLINIC | Age: 48
End: 2023-10-13

## 2023-10-13 DIAGNOSIS — Z12.31 ENCOUNTER FOR SCREENING MAMMOGRAM FOR MALIGNANT NEOPLASM OF BREAST: Primary | ICD-10-CM

## 2023-10-13 NOTE — TELEPHONE ENCOUNTER
Fax received from Mammography. Pt attempting to self-schedule mammogram, but needs orders. Last mammo 10/31/22- no evidence of malignancy  Last annual 10/7/22  Next annual 2/29/24. JLK- ok for mammo order?

## 2023-10-24 ENCOUNTER — APPOINTMENT (OUTPATIENT)
Dept: GENERAL RADIOLOGY | Facility: HOSPITAL | Age: 48
End: 2023-10-24
Attending: EMERGENCY MEDICINE
Payer: MEDICAID

## 2023-10-24 ENCOUNTER — HOSPITAL ENCOUNTER (EMERGENCY)
Facility: HOSPITAL | Age: 48
Discharge: HOME | End: 2023-10-24
Attending: EMERGENCY MEDICINE
Payer: MEDICAID

## 2023-10-24 VITALS
RESPIRATION RATE: 20 BRPM | HEART RATE: 70 BPM | DIASTOLIC BLOOD PRESSURE: 83 MMHG | OXYGEN SATURATION: 98 % | TEMPERATURE: 98 F | SYSTOLIC BLOOD PRESSURE: 132 MMHG

## 2023-10-24 DIAGNOSIS — M17.11 PRIMARY OSTEOARTHRITIS OF RIGHT KNEE: ICD-10-CM

## 2023-10-24 DIAGNOSIS — M25.561 ACUTE PAIN OF RIGHT KNEE: Primary | ICD-10-CM

## 2023-10-24 PROCEDURE — 99284 EMERGENCY DEPT VISIT MOD MDM: CPT

## 2023-10-24 PROCEDURE — 99283 EMERGENCY DEPT VISIT LOW MDM: CPT

## 2023-10-24 PROCEDURE — 73562 X-RAY EXAM OF KNEE 3: CPT | Performed by: EMERGENCY MEDICINE

## 2023-10-24 RX ORDER — NAPROXEN 500 MG/1
500 TABLET ORAL 2 TIMES DAILY PRN
Qty: 14 TABLET | Refills: 0 | Status: SHIPPED | OUTPATIENT
Start: 2023-10-24 | End: 2023-10-31

## 2023-10-24 RX ORDER — HYDROCODONE BITARTRATE AND ACETAMINOPHEN 5; 325 MG/1; MG/1
1 TABLET ORAL EVERY 6 HOURS PRN
Qty: 10 TABLET | Refills: 0 | Status: SHIPPED | OUTPATIENT
Start: 2023-10-24 | End: 2023-11-07 | Stop reason: ALTCHOICE

## 2023-10-24 RX ORDER — HYDROCODONE BITARTRATE AND ACETAMINOPHEN 5; 325 MG/1; MG/1
1 TABLET ORAL ONCE
Status: COMPLETED | OUTPATIENT
Start: 2023-10-24 | End: 2023-10-24

## 2023-10-25 NOTE — ED INITIAL ASSESSMENT (HPI)
S: pt presents to ed with c/o atraumatic right knee pain. States that she has arthritis in that knee and has an appt to see a specialist on 11/8 but yesterday, the knee felt like the patella was sliding in and out of the joint and now she has pain when putting any weight on it.

## 2023-10-31 ENCOUNTER — PATIENT MESSAGE (OUTPATIENT)
Dept: INTERNAL MEDICINE CLINIC | Facility: CLINIC | Age: 48
End: 2023-10-31

## 2023-10-31 ENCOUNTER — OFFICE VISIT (OUTPATIENT)
Dept: INTERNAL MEDICINE CLINIC | Facility: CLINIC | Age: 48
End: 2023-10-31
Payer: MEDICAID

## 2023-10-31 VITALS
HEIGHT: 59 IN | HEART RATE: 73 BPM | BODY MASS INDEX: 44.55 KG/M2 | DIASTOLIC BLOOD PRESSURE: 76 MMHG | SYSTOLIC BLOOD PRESSURE: 113 MMHG | WEIGHT: 221 LBS

## 2023-10-31 DIAGNOSIS — E66.01 MORBID OBESITY WITH BMI OF 60.0-69.9, ADULT (HCC): ICD-10-CM

## 2023-10-31 DIAGNOSIS — E88.819 INSULIN RESISTANCE: ICD-10-CM

## 2023-10-31 DIAGNOSIS — G47.33 OSA ON CPAP: ICD-10-CM

## 2023-10-31 DIAGNOSIS — Z00.00 PHYSICAL EXAM: Primary | ICD-10-CM

## 2023-10-31 DIAGNOSIS — Z12.31 VISIT FOR SCREENING MAMMOGRAM: ICD-10-CM

## 2023-10-31 DIAGNOSIS — Z12.4 SCREENING FOR CERVICAL CANCER: ICD-10-CM

## 2023-11-01 ENCOUNTER — OFFICE VISIT (OUTPATIENT)
Dept: NEUROLOGY | Facility: CLINIC | Age: 48
End: 2023-11-01
Payer: MEDICAID

## 2023-11-01 ENCOUNTER — TELEPHONE (OUTPATIENT)
Dept: NEUROLOGY | Facility: CLINIC | Age: 48
End: 2023-11-01

## 2023-11-01 VITALS — WEIGHT: 221 LBS | HEIGHT: 59 IN | BODY MASS INDEX: 44.55 KG/M2

## 2023-11-01 DIAGNOSIS — G43.011 INTRACTABLE MIGRAINE WITHOUT AURA AND WITH STATUS MIGRAINOSUS: ICD-10-CM

## 2023-11-01 PROCEDURE — 99214 OFFICE O/P EST MOD 30 MIN: CPT | Performed by: OTHER

## 2023-11-01 PROCEDURE — 3008F BODY MASS INDEX DOCD: CPT | Performed by: OTHER

## 2023-11-01 RX ORDER — UBROGEPANT 100 MG/1
TABLET ORAL
Qty: 10 TABLET | Refills: 0 | Status: SHIPPED | OUTPATIENT
Start: 2023-11-01 | End: 2024-10-31

## 2023-11-01 RX ORDER — FREMANEZUMAB-VFRM 225 MG/1.5ML
1.5 INJECTION SUBCUTANEOUS
Qty: 1.5 ML | Refills: 3 | Status: SHIPPED | OUTPATIENT
Start: 2023-11-01

## 2023-11-03 ENCOUNTER — APPOINTMENT (OUTPATIENT)
Dept: GENERAL RADIOLOGY | Age: 48
End: 2023-11-03
Attending: NURSE PRACTITIONER
Payer: MEDICAID

## 2023-11-03 ENCOUNTER — HOSPITAL ENCOUNTER (OUTPATIENT)
Age: 48
Discharge: HOME | End: 2023-11-03
Payer: MEDICAID

## 2023-11-03 VITALS
OXYGEN SATURATION: 96 % | TEMPERATURE: 98 F | RESPIRATION RATE: 18 BRPM | DIASTOLIC BLOOD PRESSURE: 73 MMHG | SYSTOLIC BLOOD PRESSURE: 123 MMHG | HEART RATE: 71 BPM

## 2023-11-03 DIAGNOSIS — J06.9 UPPER RESPIRATORY VIRUS: Primary | ICD-10-CM

## 2023-11-03 LAB — S PYO AG THROAT QL IA.RAPID: NEGATIVE

## 2023-11-03 PROCEDURE — 87651 STREP A DNA AMP PROBE: CPT | Performed by: NURSE PRACTITIONER

## 2023-11-03 PROCEDURE — 99213 OFFICE O/P EST LOW 20 MIN: CPT

## 2023-11-03 PROCEDURE — 99214 OFFICE O/P EST MOD 30 MIN: CPT

## 2023-11-03 PROCEDURE — 71046 X-RAY EXAM CHEST 2 VIEWS: CPT | Performed by: NURSE PRACTITIONER

## 2023-11-03 RX ORDER — BENZONATATE 100 MG/1
200 CAPSULE ORAL 3 TIMES DAILY PRN
Qty: 30 CAPSULE | Refills: 0 | Status: SHIPPED | OUTPATIENT
Start: 2023-11-03 | End: 2023-12-03

## 2023-11-03 RX ORDER — ALBUTEROL SULFATE 90 UG/1
2 AEROSOL, METERED RESPIRATORY (INHALATION) EVERY 4 HOURS PRN
Qty: 1 EACH | Refills: 0 | Status: SHIPPED | OUTPATIENT
Start: 2023-11-03 | End: 2023-12-03

## 2023-11-03 NOTE — DISCHARGE INSTRUCTIONS
Push fluids. Rest.  Tylenol or ibuprofen as needed for pain or fever. Take the medications as needed. Follow-up with your doctor. Return for concerns.

## 2023-11-03 NOTE — ED INITIAL ASSESSMENT (HPI)
Pt complaining of sinus pressure and sore throat x 6 days. No relief with OTC meds. Negative covid test at home on Tuesday.

## 2023-11-07 ENCOUNTER — OFFICE VISIT (OUTPATIENT)
Dept: PODIATRY CLINIC | Facility: CLINIC | Age: 48
End: 2023-11-07
Payer: MEDICAID

## 2023-11-07 DIAGNOSIS — M72.2 PLANTAR FASCIITIS, LEFT: Primary | ICD-10-CM

## 2023-11-07 PROCEDURE — 99213 OFFICE O/P EST LOW 20 MIN: CPT | Performed by: STUDENT IN AN ORGANIZED HEALTH CARE EDUCATION/TRAINING PROGRAM

## 2023-11-07 NOTE — PROGRESS NOTES
[facility] Podiatry  Progress Note      Rafia Daniels is a 50year old female. Chief Complaint   Patient presents with    Heel Pain     Left heel pain f/u- she states cortisone injection last visit helped and pain is now a 3/10. pt states she is on her feet all day at work. Pt reports mild swelling at the end of the day. HPI:     Patient is a pleasant 42-year-old female who presents to clinic today for evaluation of left heel pain. Patient received a cortisone injection on August the provided her with moderate amount of relief. Today she admits to a 3 out of 10 pain. Allergies: Sulfa antibiotics    Current Outpatient Medications   Medication Sig Dispense Refill    albuterol 108 (90 Base) MCG/ACT Inhalation Aero Soln Inhale 2 puffs into the lungs every 4 (four) hours as needed for Wheezing. 1 each 0    benzonatate 100 MG Oral Cap Take 2 capsules (200 mg total) by mouth 3 (three) times daily as needed for cough. 30 capsule 0    Fremanezumab-vfrm (AJOVY) 225 MG/1.5ML Subcutaneous Solution Prefilled Syringe Inject 1.5 mL into the skin every 30 (thirty) days. 1.5 mL 3    ubrogepant (UBRELVY) 100 MG Oral Tab Take one tablet at onset of migraine. May take additional tablet in 2 hours if needed. Do not exceed two tablets per 24 hour period. 10 tablet 0    COLLAGEN OR Take by mouth. venlafaxine ER 75 MG Oral Capsule SR 24 Hr Take 1 capsule (75 mg total) by mouth daily. 90 capsule 0    TURMERIC OR Take by mouth. Multiple Vitamins-Minerals (MULTI-VITAMIN/MINERALS) Oral Tab Take 1 tablet by mouth daily.         Past Medical History:   Diagnosis Date    Anxiety     Chicken pox     Depression     Extrinsic asthma, unspecified     H/O seasonal allergies     Hyperopia with presbyopia of both eyes 11/23/2021    Migraines     Morbid obesity with BMI of 50.0-59.9, adult (HCC)     ANJELICA on CPAP     Ovarian cyst 2012    Sleep apnea     Varicose vein       Past Surgical History:   Procedure Laterality Date          CYST ASPIRATION RIGHT      LAPAROSCOPY, GASTRIC RESTRICTIVE PROCEDURE, WITH GASTRIC BYPASS FOR MORBID  2022    MYOMECTOMY 5/> INTRAMURAL MYOMAS &/OR TOTAL WT >250 GMS, ABDOMINAL APPROACH      SKIN SURGERY  2017    R breast sebaceous cyst      Family History   Problem Relation Age of Onset    Hypertension Father     Lipids Father         HYPERLIPIDEMIA    Diabetes Father     Psychiatric Mother     Cataracts Mother     Macular degeneration Neg     Glaucoma Neg     Retinal detachment Neg       Social History     Socioeconomic History    Marital status:     Number of children: 1   Occupational History    Occupation:     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   Tobacco Use    Smoking status: Former     Packs/day: 0.00     Years: 20.00     Additional pack years: 0.00     Total pack years: 0.00     Types: Cigarettes     Quit date: 2013     Years since quitting: 10.8     Passive exposure: Past    Smokeless tobacco: Never   Vaping Use    Vaping Use: Never used   Substance and Sexual Activity    Alcohol use: Not Currently     Alcohol/week: 0.0 standard drinks of alcohol     Comment: Not frequently    Drug use: No    Sexual activity: Not Currently     Partners: Male   Other Topics Concern    Caffeine Concern Yes     Comment: 1-2 diet soda daily, 1 cup of coffee daily    Sleep Concern Yes     Comment: wakes several times    Exercise No    Pt has a pacemaker No    Pt has a defibrillator No    Reaction to local anesthetic No           REVIEW OF SYSTEMS:     Denies nause, fever, chills  No calf pain  Denies chest pain or SOB      EXAM:   LMP 10/27/2023 (Approximate)   GENERAL: well developed, well nourished, in no apparent distress  EXTREMITIES:   1. Integument: Normal skin temperature and turgor  2.  Vascular: Dorsalis pedis two out of four bilateral and posterior tibial pulses two out of   four bilateral, capillary refill normal.   3. Musculoskeletal: All muscle groups are graded 5 out of 5 in the foot and ankle. Mild tenderness with palpation to left medial calcaneal tubercle   4. Neurological: Normal sharp dull sensation; reflexes normal.      XR CHEST PA + LAT CHEST (CPT=71046)    Result Date: 11/3/2023  CONCLUSION:  1. No acute cardiopulmonary disease    Dictated by (CST): Elly Zhu MD on 11/03/2023 at 9:26 AM     Finalized by (CST): Elly Zhu MD on 11/03/2023 at 9:27 AM          XR KNEE (3 VIEWS), RIGHT (PAH=[id number])    Result Date: 10/24/2023  CONCLUSION:   Tricompartmental osteoarthritis, severe within the medial compartment. Moderate size suprapatellar joint effusion. Dictated by (CST): Marcell Cruz MD on 10/24/2023 at 9:43 PM     Finalized by (CST): Marcell Cruz MD on 10/24/2023 at 9:44 PM            ASSESSMENT AND PLAN:   Diagnoses and all orders for this visit:    Plantar fasciitis, left  -     Physical Therapy Referral - [facility] [place]        Plan:     Patient seen and examined and findings discussed with patient. Discussed etiology of condition along with treatment options. Advised patient that given only 3 out of 10 pain a cortisone injection would not be recommended at this time. Dispensed a compression foot and ankle sleeve for the left and right feet. Referral placed for physical therapy. Patient reevaluate in 3 months    The patient indicates understanding of these issues and agrees to the plan.         Denisse Allen DPM

## 2023-11-08 ENCOUNTER — HOSPITAL ENCOUNTER (OUTPATIENT)
Dept: GENERAL RADIOLOGY | Facility: HOSPITAL | Age: 48
Discharge: HOME | End: 2023-11-08
Attending: ORTHOPAEDIC SURGERY
Payer: MEDICAID

## 2023-11-08 ENCOUNTER — LAB ENCOUNTER (OUTPATIENT)
Dept: LAB | Facility: HOSPITAL | Age: 48
End: 2023-11-08
Attending: INTERNAL MEDICINE
Payer: MEDICAID

## 2023-11-08 ENCOUNTER — OFFICE VISIT (OUTPATIENT)
Dept: ORTHOPEDICS CLINIC | Facility: CLINIC | Age: 48
End: 2023-11-08
Payer: MEDICAID

## 2023-11-08 VITALS
DIASTOLIC BLOOD PRESSURE: 85 MMHG | WEIGHT: 215 LBS | SYSTOLIC BLOOD PRESSURE: 130 MMHG | BODY MASS INDEX: 43.34 KG/M2 | HEART RATE: 75 BPM | HEIGHT: 59 IN

## 2023-11-08 DIAGNOSIS — M17.0 PRIMARY OSTEOARTHRITIS OF BOTH KNEES: Primary | ICD-10-CM

## 2023-11-08 DIAGNOSIS — R52 PAIN: ICD-10-CM

## 2023-11-08 LAB
ALBUMIN SERPL-MCNC: 3.8 G/DL (ref 3.2–4.8)
ALBUMIN/GLOB SERPL: 1.2 {RATIO} (ref 1–2)
ALP LIVER SERPL-CCNC: 86 U/L
ALT SERPL-CCNC: 37 U/L
ANION GAP SERPL CALC-SCNC: 1 MMOL/L (ref 0–18)
AST SERPL-CCNC: 37 U/L (ref ?–34)
BASOPHILS # BLD AUTO: 0.03 X10(3) UL (ref 0–0.2)
BASOPHILS NFR BLD AUTO: 0.5 %
BILIRUB SERPL-MCNC: 0.6 MG/DL (ref 0.3–1.2)
BILIRUB UR QL: NEGATIVE
BUN BLD-MCNC: 19 MG/DL (ref 9–23)
BUN/CREAT SERPL: 31.7 (ref 10–20)
CALCIUM BLD-MCNC: 9.1 MG/DL (ref 8.7–10.4)
CHLORIDE SERPL-SCNC: 108 MMOL/L (ref 98–112)
CHOLEST SERPL-MCNC: 150 MG/DL (ref ?–200)
CLARITY UR: CLEAR
CO2 SERPL-SCNC: 32 MMOL/L (ref 21–32)
COLOR UR: YELLOW
CREAT BLD-MCNC: 0.6 MG/DL
DEPRECATED RDW RBC AUTO: 42.2 FL (ref 35.1–46.3)
EGFRCR SERPLBLD CKD-EPI 2021: 111 ML/MIN/1.73M2 (ref 60–?)
EOSINOPHIL # BLD AUTO: 0.17 X10(3) UL (ref 0–0.7)
EOSINOPHIL NFR BLD AUTO: 3 %
ERYTHROCYTE [DISTWIDTH] IN BLOOD BY AUTOMATED COUNT: 12.8 % (ref 11–15)
EST. AVERAGE GLUCOSE BLD GHB EST-MCNC: 114 MG/DL (ref 68–126)
FASTING PATIENT LIPID ANSWER: YES
FASTING STATUS PATIENT QL REPORTED: YES
GLOBULIN PLAS-MCNC: 3.2 G/DL (ref 2.8–4.4)
GLUCOSE BLD-MCNC: 86 MG/DL (ref 70–99)
GLUCOSE UR-MCNC: NORMAL MG/DL
HBA1C MFR BLD: 5.6 % (ref ?–5.7)
HCT VFR BLD AUTO: 39.9 %
HDLC SERPL-MCNC: 44 MG/DL (ref 40–59)
HGB BLD-MCNC: 13.2 G/DL
HGB UR QL STRIP.AUTO: NEGATIVE
IMM GRANULOCYTES # BLD AUTO: 0 X10(3) UL (ref 0–1)
IMM GRANULOCYTES NFR BLD: 0 %
KETONES UR-MCNC: NEGATIVE MG/DL
LDLC SERPL CALC-MCNC: 95 MG/DL (ref ?–100)
LEUKOCYTE ESTERASE UR QL STRIP.AUTO: NEGATIVE
LYMPHOCYTES # BLD AUTO: 2.32 X10(3) UL (ref 1–4)
LYMPHOCYTES NFR BLD AUTO: 40.9 %
MCH RBC QN AUTO: 29.5 PG (ref 26–34)
MCHC RBC AUTO-ENTMCNC: 33.1 G/DL (ref 31–37)
MCV RBC AUTO: 89.3 FL
MONOCYTES # BLD AUTO: 0.37 X10(3) UL (ref 0.1–1)
MONOCYTES NFR BLD AUTO: 6.5 %
NEUTROPHILS # BLD AUTO: 2.78 X10 (3) UL (ref 1.5–7.7)
NEUTROPHILS # BLD AUTO: 2.78 X10(3) UL (ref 1.5–7.7)
NEUTROPHILS NFR BLD AUTO: 49.1 %
NITRITE UR QL STRIP.AUTO: NEGATIVE
NONHDLC SERPL-MCNC: 106 MG/DL (ref ?–130)
OSMOLALITY SERPL CALC.SUM OF ELEC: 294 MOSM/KG (ref 275–295)
PH UR: 5.5 [PH] (ref 5–8)
PLATELET # BLD AUTO: 221 10(3)UL (ref 150–450)
POTASSIUM SERPL-SCNC: 3.9 MMOL/L (ref 3.5–5.1)
PROT SERPL-MCNC: 7 G/DL (ref 5.7–8.2)
PROT UR-MCNC: NEGATIVE MG/DL
RBC # BLD AUTO: 4.47 X10(6)UL
SODIUM SERPL-SCNC: 141 MMOL/L (ref 136–145)
SP GR UR STRIP: 1.03 (ref 1–1.03)
T4 FREE SERPL-MCNC: 1 NG/DL (ref 0.8–1.7)
TRIGL SERPL-MCNC: 52 MG/DL (ref 30–149)
TSI SER-ACNC: 2.48 MIU/ML (ref 0.55–4.78)
UROBILINOGEN UR STRIP-ACNC: NORMAL
VLDLC SERPL CALC-MCNC: 8 MG/DL (ref 0–30)
WBC # BLD AUTO: 5.7 X10(3) UL (ref 4–11)

## 2023-11-08 PROCEDURE — 84443 ASSAY THYROID STIM HORMONE: CPT | Performed by: INTERNAL MEDICINE

## 2023-11-08 PROCEDURE — 80053 COMPREHEN METABOLIC PANEL: CPT | Performed by: INTERNAL MEDICINE

## 2023-11-08 PROCEDURE — 3075F SYST BP GE 130 - 139MM HG: CPT | Performed by: ORTHOPAEDIC SURGERY

## 2023-11-08 PROCEDURE — 81003 URINALYSIS AUTO W/O SCOPE: CPT | Performed by: INTERNAL MEDICINE

## 2023-11-08 PROCEDURE — 84439 ASSAY OF FREE THYROXINE: CPT | Performed by: INTERNAL MEDICINE

## 2023-11-08 PROCEDURE — 20610 DRAIN/INJ JOINT/BURSA W/O US: CPT | Performed by: ORTHOPAEDIC SURGERY

## 2023-11-08 PROCEDURE — 3079F DIAST BP 80-89 MM HG: CPT | Performed by: ORTHOPAEDIC SURGERY

## 2023-11-08 PROCEDURE — 99244 OFF/OP CNSLTJ NEW/EST MOD 40: CPT | Performed by: ORTHOPAEDIC SURGERY

## 2023-11-08 PROCEDURE — 73564 X-RAY EXAM KNEE 4 OR MORE: CPT | Performed by: ORTHOPAEDIC SURGERY

## 2023-11-08 PROCEDURE — 3008F BODY MASS INDEX DOCD: CPT | Performed by: ORTHOPAEDIC SURGERY

## 2023-11-08 PROCEDURE — 83036 HEMOGLOBIN GLYCOSYLATED A1C: CPT | Performed by: INTERNAL MEDICINE

## 2023-11-08 PROCEDURE — 85025 COMPLETE CBC W/AUTO DIFF WBC: CPT | Performed by: INTERNAL MEDICINE

## 2023-11-08 PROCEDURE — 80061 LIPID PANEL: CPT | Performed by: INTERNAL MEDICINE

## 2023-11-08 PROCEDURE — 36415 COLL VENOUS BLD VENIPUNCTURE: CPT | Performed by: INTERNAL MEDICINE

## 2023-11-08 RX ORDER — TRIAMCINOLONE ACETONIDE 40 MG/ML
40 INJECTION, SUSPENSION INTRA-ARTICULAR; INTRAMUSCULAR
Status: COMPLETED | OUTPATIENT
Start: 2023-11-08 | End: 2023-11-08

## 2023-11-08 RX ADMIN — TRIAMCINOLONE ACETONIDE 40 MG: 40 INJECTION, SUSPENSION INTRA-ARTICULAR; INTRAMUSCULAR at 17:11:00

## 2023-11-08 RX ADMIN — TRIAMCINOLONE ACETONIDE 40 MG: 40 INJECTION, SUSPENSION INTRA-ARTICULAR; INTRAMUSCULAR at 17:10:00

## 2023-11-08 NOTE — PROGRESS NOTES
Per verbal order from Dr. Patel Lean draw up 3ml of 0.5% Marcaine & 2ml 1% lidocaine and 1ml of Kenalog 40 for cortisone injection to bilateral knee. Donna Man MA    Patient provided education handout for cortisone injection.

## 2023-11-13 ENCOUNTER — TELEPHONE (OUTPATIENT)
Dept: ORTHOPEDICS CLINIC | Facility: CLINIC | Age: 48
End: 2023-11-13

## 2023-11-13 NOTE — TELEPHONE ENCOUNTER
Patient received bilateral cortisone knee injections on 11/8/23. Please advise on how long patient would have to wait for gel injections after cortisone

## 2023-11-13 NOTE — TELEPHONE ENCOUNTER
Bilateral Durolane injections have been approved. Auth # [id number] is valid until 5/11/2024.     Okay to schedule.

## 2023-11-16 NOTE — TELEPHONE ENCOUNTER
Prior Authorization History  ubrogepant (UBRELVY) 100 MG Oral Tab     History    View all authorizations for this medication   Approved   11/1/2023 11:04 AM PAST SURGICAL HISTORY:  No significant past surgical history      PAST SURGICAL HISTORY:  H/O neck surgery

## 2023-11-17 ENCOUNTER — TELEMEDICINE (OUTPATIENT)
Dept: INTERNAL MEDICINE CLINIC | Facility: CLINIC | Age: 48
End: 2023-11-17
Payer: MEDICAID

## 2023-11-17 DIAGNOSIS — J01.00 ACUTE NON-RECURRENT MAXILLARY SINUSITIS: Primary | ICD-10-CM

## 2023-11-17 PROCEDURE — 99213 OFFICE O/P EST LOW 20 MIN: CPT | Performed by: NURSE PRACTITIONER

## 2023-11-17 RX ORDER — AMOXICILLIN AND CLAVULANATE POTASSIUM 875; 125 MG/1; MG/1
1 TABLET, FILM COATED ORAL 2 TIMES DAILY
Qty: 20 TABLET | Refills: 0 | Status: SHIPPED | OUTPATIENT
Start: 2023-11-17 | End: 2023-11-27

## 2023-11-17 RX ORDER — METHYLPREDNISOLONE 4 MG/1
TABLET ORAL
Qty: 21 TABLET | Refills: 0 | Status: SHIPPED | OUTPATIENT
Start: 2023-11-17

## 2023-11-17 RX ORDER — CODEINE PHOSPHATE AND GUAIFENESIN 10; 100 MG/5ML; MG/5ML
5 SOLUTION ORAL EVERY 6 HOURS PRN
Qty: 118 ML | Refills: 0 | Status: SHIPPED | OUTPATIENT
Start: 2023-11-17

## 2023-11-17 NOTE — PROGRESS NOTES
Virtual Visit Check-In    Brittney Peers or legal guardian   verbally consents to a Virtual Visit Check-In service on 11/17/2023    Patient understands and accepts financial responsibility for any deductible, co-insurance and/or co-pays associated with this service. Duration of the service:   Call duration  10 minutes   Video visit     Chief Complaint   Patient presents with    Cough/URI       HPI:    Patient ID: Teri Carrera is a 50year old female. She presents with headaches, cough, congestion, body aches and sinus pressure. Her symptoms started about 3 weeks ago. She denies any fever, SOB or chest pain. She has been taking claritin, tessalon pearls and delsym cough syrup with little relief. She did go to the urgent care on 11/3 had a chest x-ray completed which was negative as well as a negative strep test.      ROS    Review of Systems   Constitutional:  Negative for fever. HENT:  Positive for congestion and sinus pressure. Negative for ear pain and sore throat. Respiratory:  Positive for cough. Negative for shortness of breath and wheezing. Cardiovascular:  Negative for chest pain. Gastrointestinal:  Negative for abdominal pain, diarrhea, nausea and vomiting. Genitourinary: Negative. Musculoskeletal:  Positive for arthralgias. Skin: Negative. Neurological:  Positive for headaches. Psychiatric/Behavioral: Negative. Current Outpatient Medications   Medication Sig Dispense Refill    amoxicillin clavulanate 875-125 MG Oral Tab Take 1 tablet by mouth 2 (two) times daily for 10 days. 20 tablet 0    methylPREDNISolone 4 MG Oral Tablet Therapy Pack Take as directed 21 tablet 0    guaiFENesin-codeine (CHERATUSSIN AC) 100-10 MG/5ML Oral Solution Take 5 mL by mouth every 6 (six) hours as needed. 118 mL 0    albuterol 108 (90 Base) MCG/ACT Inhalation Aero Soln Inhale 2 puffs into the lungs every 4 (four) hours as needed for Wheezing.  1 each 0    benzonatate 100 MG Oral Cap Take 2 capsules (200 mg total) by mouth 3 (three) times daily as needed for cough. 30 capsule 0    Fremanezumab-vfrm (AJOVY) 225 MG/1.5ML Subcutaneous Solution Prefilled Syringe Inject 1.5 mL into the skin every 30 (thirty) days. 1.5 mL 3    ubrogepant (UBRELVY) 100 MG Oral Tab Take one tablet at onset of migraine. May take additional tablet in 2 hours if needed. Do not exceed two tablets per 24 hour period. 10 tablet 0    COLLAGEN OR Take by mouth. venlafaxine ER 75 MG Oral Capsule SR 24 Hr Take 1 capsule (75 mg total) by mouth daily. 90 capsule 0    TURMERIC OR Take by mouth. Multiple Vitamins-Minerals (MULTI-VITAMIN/MINERALS) Oral Tab Take 1 tablet by mouth daily. Allergies: Allergies   Allergen Reactions    Sulfa Antibiotics RASH          Current Outpatient Medications:     amoxicillin clavulanate 875-125 MG Oral Tab, Take 1 tablet by mouth 2 (two) times daily for 10 days. , Disp: 20 tablet, Rfl: 0    methylPREDNISolone 4 MG Oral Tablet Therapy Pack, Take as directed, Disp: 21 tablet, Rfl: 0    guaiFENesin-codeine (CHERATUSSIN AC) 100-10 MG/5ML Oral Solution, Take 5 mL by mouth every 6 (six) hours as needed. , Disp: 118 mL, Rfl: 0    albuterol 108 (90 Base) MCG/ACT Inhalation Aero Soln, Inhale 2 puffs into the lungs every 4 (four) hours as needed for Wheezing., Disp: 1 each, Rfl: 0    benzonatate 100 MG Oral Cap, Take 2 capsules (200 mg total) by mouth 3 (three) times daily as needed for cough. , Disp: 30 capsule, Rfl: 0    Fremanezumab-vfrm (AJOVY) 225 MG/1.5ML Subcutaneous Solution Prefilled Syringe, Inject 1.5 mL into the skin every 30 (thirty) days. , Disp: 1.5 mL, Rfl: 3    ubrogepant (UBRELVY) 100 MG Oral Tab, Take one tablet at onset of migraine. May take additional tablet in 2 hours if needed. Do not exceed two tablets per 24 hour period. , Disp: 10 tablet, Rfl: 0    COLLAGEN OR, Take by mouth., Disp: , Rfl:     venlafaxine ER 75 MG Oral Capsule SR 24 Hr, Take 1 capsule (75 mg total) by mouth daily. , Disp: 90 capsule, Rfl: 0    TURMERIC OR, Take by mouth., Disp: , Rfl:     Multiple Vitamins-Minerals (MULTI-VITAMIN/MINERALS) Oral Tab, Take 1 tablet by mouth daily. , Disp: , Rfl:     Past Medical History:   Diagnosis Date    Anxiety     Chicken pox     Depression     Extrinsic asthma, unspecified     H/O seasonal allergies     Hyperopia with presbyopia of both eyes 11/23/2021    Migraines     Morbid obesity with BMI of 50.0-59.9, adult ([facility] [address]     ANJELICA on CPAP     Ovarian cyst 2012    Sleep apnea     Varicose vein          PHYSICAL EXAM:     Vitals signs taken at home if available:    Limited examination for this telephone visit due to the coronavirus emergency    Patient was speaking in complete sentences, no increased work of breathing and very coherent and alert on the phone. Alert and oriented x 3  Patient was responding to questions appropriately. Patient did not appear short of breath. ASSESSMENT/PLAN:     Encounter Diagnosis   Name Primary? Acute non-recurrent maxillary sinusitis Yes       1. Acute non-recurrent maxillary sinusitis  - amoxicillin clavulanate 875-125 MG Oral Tab; Take 1 tablet by mouth 2 (two) times daily for 10 days. Dispense: 20 tablet; Refill: 0  - methylPREDNISolone 4 MG Oral Tablet Therapy Pack; Take as directed  Dispense: 21 tablet; Refill: 0  - guaiFENesin-codeine (CHERATUSSIN AC) 100-10 MG/5ML Oral Solution; Take 5 mL by mouth every 6 (six) hours as needed. Dispense: 118 mL; Refill: 0      Patient reminded to practice good health and safety measures including washing hands, social distancing, covering mouth when coughing/ sneezing, avoid social meetings/ gatherings in face of this Covid 19 pandemic. Patient verbalized understanding of plan and all questions answered to the best of my ability. Patient to call back if any change/ worsening of symptoms. No orders of the defined types were placed in this encounter.       Meds This Visit:  Requested Prescriptions     Signed Prescriptions Disp Refills    amoxicillin clavulanate 875-125 MG Oral Tab 20 tablet 0     Sig: Take 1 tablet by mouth 2 (two) times daily for 10 days. methylPREDNISolone 4 MG Oral Tablet Therapy Pack 21 tablet 0     Sig: Take as directed    guaiFENesin-codeine (CHERATUSSIN AC) 100-10 MG/5ML Oral Solution 118 mL 0     Sig: Take 5 mL by mouth every 6 (six) hours as needed. Imaging & Referrals:  None               EDISON Cox  11/17/2023  3:42 PM      Please note that the following visit was completed using two-way, real-time interactive audio and/or video communication. This has been done in good анна to provide continuity of care in the best interest of the provider-patient relationship, due to the ongoing public health crisis/national emergency and because of restrictions of visitation. There are limitations of this visit as no physical exam could be performed. Every conscious effort was taken to allow for sufficient and adequate time. This billing was spent on reviewing labs, medications, radiology tests and decision making.   Appropriate medical decision-making and tests are ordered as detailed in the plan of care above  [id number]

## 2023-12-01 ENCOUNTER — OFFICE VISIT (OUTPATIENT)
Dept: PHYSICAL THERAPY | Age: 48
End: 2023-12-01
Attending: STUDENT IN AN ORGANIZED HEALTH CARE EDUCATION/TRAINING PROGRAM
Payer: MEDICAID

## 2023-12-01 DIAGNOSIS — M72.2 PLANTAR FASCIITIS, LEFT: Primary | ICD-10-CM

## 2023-12-01 PROCEDURE — 97110 THERAPEUTIC EXERCISES: CPT

## 2023-12-01 PROCEDURE — 97163 PT EVAL HIGH COMPLEX 45 MIN: CPT

## 2023-12-01 NOTE — PROGRESS NOTES
LOWER EXTREMITY EVALUATION:     Diagnosis:   Plantar fasciitis, left (M72.2), Primary osteoarthritis of both knees (M17.0)    Referring Provider: Linda Chacon  Date of Evaluation:    12/1/2023    Precautions:  None Next MD visit:   none scheduled  Date of Surgery: n/a     PATIENT SUMMARY   Mich Ayala is a 50year old female who presents to therapy today with complaints of L foot pain (plantar, dorsum and laterally) and B knee pain all for sometime. She had Gastric Bypass 4/2022 and has lost 115# since, but continues to have weight loss goals. Pt describes pain level current 4/10, at best 2/10, at worst 8/10. Current functional limitations include limited activity tolerance with her young son, and limited tolerance for upright activity, especially prolonged upright walk/stand as she must do at work. Ngocal Denise describes prior level of function also limited due to long standing knee pain. Pt goals include further weight loss, eliminate foot pain, minimize knee pain with future potential surgeries. Past medical history was reviewed with Morgan Walker. Significant findings include BMI and recent weight loss. Jonny Duong presents to physical therapy evaluation with primary c/o B knee pain and L foot pain. The results of the objective tests and measures show TTP L foot and B knees. Functional deficits include but are not limited to upright activity tolerance and foot pain especially after sleep or prolonged sitting. Signs and symptoms are consistent with diagnosis of B knee OA and L plantar fasciitis/tendonitis. Pt and PT discussed evaluation findings, pathology, POC and HEP. Pt voiced understanding and performs HEP correctly without reported pain. Skilled Physical Therapy is medically necessary to address the above impairments and reach functional goals. OBJECTIVE:   Observation: Obese woman in no apparent distress.   Palpation: TTP throughout B knees, especially medially, as well as L plantar surface of foot at heel, though also dorsum of L foot and lateral ankle. Sensation: Intact to light touch B LE's    AROM: (* denotes performed with pain)  Hip Knee Foot/Ankle   B hips grossly WNL's B knees grossly WNL's    B ankles/feet/toes WNL's       Strength/MMT: (* denotes performed with pain)  Hip Knee Foot/Ankle   Flexion: R 4/5; L 4/5  Extension: R 4/5; L 4/5  Abduction: R 4-/5; L 4-/5   Flexion: R 4/5; L 4/5  Extension: R 4+/5; L 4+/5    DF: R 5/5; L 4/5  PF: R 5/5; L 4/5  INV: R 5/5; L 4-/5  EV: R 4+/5; L 4-/5  Great toe ext: R 4/5; L 4-/5       Gait: pt ambulates on level ground with decreased step length      LEFS Score  LEFS Score: 42.5 % (12/1/2023  4:24 PM)      SHANELL Treatment and Response:   Pt education was provided on exam findings, treatment diagnosis, treatment plan, expectations, and prognosis. Pt was also provided recommendations for activity modifications, possible soreness after evaluation, modalities as needed [ice/heat], importance of remaining active, and shoe wear. Patient was instructed in and issued a HEP for:   - Avoid barefoot  - Avoid prolonged PF posture  - ice to plantar fascia/feet  - Toe scrunches, abd, extensions  - Ankle AROM variations  - Hip SLR for flex, ext, abd all lying down    Charges: PT Eval High Complexity, 35      Total Timed Treatment: 10 min     Total Treatment Time: 45 min     Based on clinical rationale and outcome measures, this evaluation involved High Complexity decision making due to 3+ personal factors/comorbidities, 3 body structures involved/activity limitations, and evolving symptoms including changing pain levels.   PLAN OF CARE:    Goals: (to be met in 10 visits)  - Pt to report minimal to no pain L foot/ankle  - Pt to report minimal discomfort B knees  - Pt to report ability to tolerate full day at work without increased pain  - LE MMT grossly at least 4+/5    Frequency / Duration: Patient will be seen for 2 x/week or a total of 10 visits over a 90 day period. Treatment will include: Gait training, Manual Therapy, Neuromuscular Re-education, Therapeutic Activities, Therapeutic Exercise, and Home Exercise Program instruction    Education or treatment limitation: None  Rehab Potential:good      Patient/Family/Caregiver was advised of these findings, precautions, and treatment options and has agreed to actively participate in planning and for this course of care. Thank you for your referral. Please co-sign or sign and return this letter via fax as soon as possible to [phone]. If you have any questions, please contact me at Dept: [phone]    Sincerely,  Electronically signed by therapist: Jackqulyn Spatz, PT  Physician's certification required: Yes  I certify the need for these services furnished under this plan of treatment and while under my care.     X___________________________________________________ Date____________________    Certification From: 46/5/4248  To:2/29/2024

## 2023-12-05 ENCOUNTER — OFFICE VISIT (OUTPATIENT)
Dept: PHYSICAL THERAPY | Age: 48
End: 2023-12-05
Attending: STUDENT IN AN ORGANIZED HEALTH CARE EDUCATION/TRAINING PROGRAM
Payer: MEDICAID

## 2023-12-05 PROCEDURE — 97110 THERAPEUTIC EXERCISES: CPT

## 2023-12-06 NOTE — PROGRESS NOTES
Diagnosis:   Plantar fasciitis, left (M72.2), Primary osteoarthritis of both knees (M17.0)        Referring Provider: Dominique Lisa  Date of Evaluation:    12/1/2023    Precautions:  None Next MD visit:   none scheduled  Date of Surgery: n/a   Insurance Primary/Secondary: BLUE CROSS MEDICAID / N/A     # Auth Visits: 6 by 1/30/2024            Subjective: Admits to being barefoot at home due to hot flashes, but understands potential benefit to L foot. L foot generally as per eval, R knee really hurting lately. Pain: 6/10      Objective: See below treatment grid. Assessment: As per eval.  Again, encouraged poor walking for increasing LE activity without impact/WB'ing as well as reiterating protective behaviors for plantar fascia. Goals:   - Pt to report minimal to no pain L foot/ankle  - Pt to report minimal discomfort B knees  - Pt to report ability to tolerate full day at work without increased pain  - LE MMT grossly at least 4+/5    Plan: Progress foot/ankle and general LE strength as able without provoking knee symptoms. Date: 12/6/2023  TX#: 2/6 Date:                 TX#: 3/ Date:                 TX#: 4/ Date:                 TX#: 5/ Date:    Tx#: 6/   Ther Ex (30')  - NuStep L5 * 8'  - Toe Scrunches, spreading, toe yoga (alt ext great vs lesser toes)  - towel grabbing with toes  - SLR's: hip flex, ext, abd  - LAQ's                            HEP: as per eval    Charges: TEx3       Total Timed Treatment: 45 min  Total Treatment Time: 45 min

## 2023-12-08 ENCOUNTER — OFFICE VISIT (OUTPATIENT)
Dept: PHYSICAL THERAPY | Age: 48
End: 2023-12-08
Attending: STUDENT IN AN ORGANIZED HEALTH CARE EDUCATION/TRAINING PROGRAM
Payer: MEDICAID

## 2023-12-08 PROCEDURE — 97110 THERAPEUTIC EXERCISES: CPT

## 2023-12-08 NOTE — PROGRESS NOTES
Diagnosis:   Plantar fasciitis, left (M72.2), Primary osteoarthritis of both knees (M17.0)        Referring Provider: Etelvina Kee  Date of Evaluation:    12/1/2023    Precautions:  None Next MD visit:   none scheduled  Date of Surgery: n/a   Insurance Primary/Secondary: BLUE CROSS MEDICAID / N/A     # Auth Visits: 6 by 1/30/2024            Subjective: Late today as coming directly from work. Apologizes. Has done some homework. Doesn't like foot/toe exercises. Pain: 6/10      Objective: See below treatment grid. Popping noted at medial R patella during NuStep. Assessment:  Good initial tolerance for Leg press and TB ankle exercises. Goals:   - Pt to report minimal to no pain L foot/ankle  - Pt to report minimal discomfort B knees  - Pt to report ability to tolerate full day at work without increased pain  - LE MMT grossly at least 4+/5    Plan: Progress foot/ankle and general LE strength as able without provoking knee symptoms. Date: 12/6/2023  TX#: 2/6 Date:  12/8/2023             TX#: 3/6 Date:                 TX#: 4/ Date:                 TX#: 5/ Date:    Tx#: 6/   Ther Ex (30')  - NuStep L5 * 8'  - Toe Scrunches, spreading, toe yoga (alt ext great vs lesser toes)  - towel grabbing with toes  - SLR's: hip flex, ext, abd  - LAQ's Ther Ex (45')  - NuStep L5 * 8'  - Toe Scrunches, spreading, toe yoga (alt ext great vs lesser toes)  - towel grabbing with toes  - SLR's: hip flex, ext, abd  - Ankle DF, PF, Inv, Ever with TB  - Shuttle leg press:     - B 7bands x30     - R/L 5bands x30 ea                           HEP: [id number]    Charges: TEx3       Total Timed Treatment: 45 min  Total Treatment Time: 45 min

## 2023-12-12 ENCOUNTER — APPOINTMENT (OUTPATIENT)
Dept: PHYSICAL THERAPY | Age: 48
End: 2023-12-12
Attending: STUDENT IN AN ORGANIZED HEALTH CARE EDUCATION/TRAINING PROGRAM
Payer: MEDICAID

## 2023-12-14 ENCOUNTER — APPOINTMENT (OUTPATIENT)
Dept: PHYSICAL THERAPY | Age: 48
End: 2023-12-14
Attending: STUDENT IN AN ORGANIZED HEALTH CARE EDUCATION/TRAINING PROGRAM
Payer: MEDICAID

## 2023-12-18 RX ORDER — VENLAFAXINE HYDROCHLORIDE 75 MG/1
75 CAPSULE, EXTENDED RELEASE ORAL DAILY
Qty: 90 CAPSULE | Refills: 0 | Status: SHIPPED | OUTPATIENT
Start: 2023-12-18

## 2023-12-18 NOTE — TELEPHONE ENCOUNTER
Last annual - 10/7/2022  Lasr pap - 2/5/2021, negative  Last mammo - 10/31/2022, negative    Next mammo is scheduled on 1/16/2024  Next annual - 2/29/2024    Rx pended for #90. Message to [facility] for refills.

## 2023-12-19 ENCOUNTER — OFFICE VISIT (OUTPATIENT)
Dept: PHYSICAL THERAPY | Age: 48
End: 2023-12-19
Attending: STUDENT IN AN ORGANIZED HEALTH CARE EDUCATION/TRAINING PROGRAM
Payer: MEDICAID

## 2023-12-19 PROCEDURE — 97110 THERAPEUTIC EXERCISES: CPT

## 2023-12-19 NOTE — PROGRESS NOTES
Diagnosis:   Plantar fasciitis, left (M72.2), Primary osteoarthritis of both knees (M17.0)        Referring Provider: Serafin Castañeda  Date of Evaluation:    12/1/2023    Precautions:  None Next MD visit:   none scheduled  Date of Surgery: n/a   Insurance Primary/Secondary: BLUE CROSS MEDICAID / N/A     # Auth Visits: 6 by 1/30/2024            Subjective: Ankle/foot tolerating activity duration a little better. Work has required longer shifts due to holiday time. Pain: 6/10      Objective: See below treatment grid. Crepitus noted R only, during Shuttle leg press. Assessment:  Mild improvements. Goals:   - Pt to report minimal to no pain L foot/ankle  - Pt to report minimal discomfort B knees  - Pt to report ability to tolerate full day at work without increased pain  - LE MMT grossly at least 4+/5    Plan: Pt to assemble home equipment (seated leg press combined with UE Row?) Progress foot/ankle and general LE strength as able without provoking knee symptoms. Date: 12/6/2023  TX#: 2/6 Date:  12/8/2023             TX#: 3/6 Date:   12/19/2023         TX#: 4/6 Date:                 TX#: 5/ Date:    Tx#: 6/   Ther Ex (30')  - NuStep L5 * 8'  - Toe Scrunches, spreading, toe yoga (alt ext great vs lesser toes)  - towel grabbing with toes  - SLR's: hip flex, ext, abd  - LAQ's Ther Ex (45')  - NuStep L5 * 8'  - Toe Scrunches, spreading, toe yoga (alt ext great vs lesser toes)  - towel grabbing with toes  - SLR's: hip flex, ext, abd  - Ankle DF, PF, Inv, Ever with TB  - Shuttle leg press:     - B 7bands x30     - R/L 5bands x30 ea Ther Ex (45')  - NuStep L5 * 8'  - Toe Scrunches, spreading, toe yoga (alt ext great vs lesser toes)  - towel grabbing with toes  - SLR's: hip flex, ext, abd  - Ankle DF, PF, Inv, Ever with TB  - Shuttle leg press:     - B 8bands x30     - R/L 5bands x30 ea                          HEP: [id number]    Charges: TEx3       Total Timed Treatment: 45 min  Total Treatment Time: 45 min

## 2023-12-26 ENCOUNTER — OFFICE VISIT (OUTPATIENT)
Dept: PHYSICAL THERAPY | Age: 48
End: 2023-12-26
Attending: STUDENT IN AN ORGANIZED HEALTH CARE EDUCATION/TRAINING PROGRAM
Payer: MEDICAID

## 2023-12-26 ENCOUNTER — APPOINTMENT (OUTPATIENT)
Dept: PHYSICAL THERAPY | Age: 48
End: 2023-12-26
Attending: ORTHOPAEDIC SURGERY
Payer: MEDICAID

## 2023-12-26 PROCEDURE — 97112 NEUROMUSCULAR REEDUCATION: CPT

## 2023-12-26 PROCEDURE — 97110 THERAPEUTIC EXERCISES: CPT

## 2023-12-26 NOTE — PROGRESS NOTES
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 Especially L knee, but also R foot. Pain: 6/10      Objective: See below treatment grid. Assessment:  General fatigue, and continued symptoms R knee and L foot. Goals:   - Pt to report minimal to no pain L foot/ankle  - Pt to report minimal discomfort B knees  - Pt to report ability to tolerate full day at work without increased pain  - LE MMT grossly at least 4+/5    Plan: Pt didn't yet assemble home equipment (seated leg press combined with UE Row?) Will do so today/tomorrow. Progress foot/ankle and general LE strength as able without provoking knee symptoms. Date: 12/6/2023  TX#: 2/6 Date:  12/8/2023             TX#: 3/6 Date:   12/19/2023         TX#: 4/6 Date: 12/26/2023      TX#: 5/6 Date:    Tx#: 6/   Ther Ex (30')  - NuStep L5 * 8'  - Toe Scrunches, spreading, toe yoga (alt ext great vs lesser toes)  - towel grabbing with toes  - SLR's: hip flex, ext, abd  - LAQ's Ther Ex (45')  - NuStep L5 * 8'  - Toe Scrunches, spreading, toe yoga (alt ext great vs lesser toes)  - towel grabbing with toes  - SLR's: hip flex, ext, abd  - Ankle DF, PF, Inv, Ever with TB  - Shuttle leg press:     - B 7bands x30     - R/L 5bands x30 ea Ther Ex (45')  - NuStep L5 * 8'  - Toe Scrunches, spreading, toe yoga (alt ext great vs lesser toes)  - towel grabbing with toes  - SLR's: hip flex, ext, abd  - Ankle DF, PF, Inv, Ever with TB  - Shuttle leg press:     - B 8bands x30     - R/L 5bands x30 ea Ther Ex (35')  - NuStep L5 * 8'  - Toe Scrunches, spreading, toe yoga (alt ext great vs lesser toes)  - towel grabbing with toes  - SLR's: hip flex, ext, abd  - Ankle DF, PF, Inv, Ever with TB  - Shuttle leg press:     - B 8bands 2x30     - R/L 5bands 2x30 ea       Neuro Re-Ed (10')  - Lele board AP and lat  - SLS on level, EO                  HEP: [id number]    Charges: Tex2, Neuro       Total Timed Treatment: 45 min  Total Treatment Time: 45 min

## 2024-01-03 ENCOUNTER — TELEPHONE (OUTPATIENT)
Dept: GASTROENTEROLOGY | Facility: CLINIC | Age: 49
End: 2024-01-03

## 2024-01-03 ENCOUNTER — OFFICE VISIT (OUTPATIENT)
Dept: PHYSICAL THERAPY | Age: 49
End: 2024-01-03
Attending: STUDENT IN AN ORGANIZED HEALTH CARE EDUCATION/TRAINING PROGRAM
Payer: MEDICAID

## 2024-01-03 ENCOUNTER — OFFICE VISIT (OUTPATIENT)
Dept: GASTROENTEROLOGY | Facility: CLINIC | Age: 49
End: 2024-01-03

## 2024-01-03 ENCOUNTER — APPOINTMENT (OUTPATIENT)
Dept: PHYSICAL THERAPY | Age: 49
End: 2024-01-03
Attending: ORTHOPAEDIC SURGERY
Payer: MEDICAID

## 2024-01-03 VITALS
DIASTOLIC BLOOD PRESSURE: 78 MMHG | BODY MASS INDEX: 42.74 KG/M2 | WEIGHT: 212 LBS | SYSTOLIC BLOOD PRESSURE: 124 MMHG | HEIGHT: 59 IN

## 2024-01-03 DIAGNOSIS — Z12.11 SCREEN FOR COLON CANCER: Primary | ICD-10-CM

## 2024-01-03 DIAGNOSIS — Z12.11 ENCOUNTER FOR SCREENING COLONOSCOPY: Primary | ICD-10-CM

## 2024-01-03 PROCEDURE — 3078F DIAST BP <80 MM HG: CPT | Performed by: INTERNAL MEDICINE

## 2024-01-03 PROCEDURE — 97112 NEUROMUSCULAR REEDUCATION: CPT

## 2024-01-03 PROCEDURE — 3074F SYST BP LT 130 MM HG: CPT | Performed by: INTERNAL MEDICINE

## 2024-01-03 PROCEDURE — 97110 THERAPEUTIC EXERCISES: CPT

## 2024-01-03 PROCEDURE — 99214 OFFICE O/P EST MOD 30 MIN: CPT | Performed by: INTERNAL MEDICINE

## 2024-01-03 PROCEDURE — 3008F BODY MASS INDEX DOCD: CPT | Performed by: INTERNAL MEDICINE

## 2024-01-03 RX ORDER — POLYETHYLENE GLYCOL-3350 AND ELECTROLYTES 236; 6.74; 5.86; 2.97; 22.74 G/274.31G; G/274.31G; G/274.31G; G/274.31G; G/274.31G
4000 POWDER, FOR SOLUTION ORAL ONCE
Qty: 4000 ML | Refills: 0 | Status: SHIPPED | OUTPATIENT
Start: 2024-01-03 | End: 2024-01-03

## 2024-01-03 NOTE — PROGRESS NOTES
[facility] - Gastroenterology  Clinic Follow-up Visit    Chief Complaint   Patient presents with    Colonoscopy Screening     No prior colon; no family hx of colon cancer       Subjective/HPI:   Alix Ordonez is a 48 year old female, patient of Dr. Mon with history of acid reflux and obesity s/p gastric bypass 5/9/2022, who presents for screening colonoscopy      Past Visit(s):  11/8/2021    Alix Ordonez is a 46 year old female, seen in consultation by request or Dr. mon, with history of anxiety, depression, seasonal allergies, who presents for colon cancer screening evaluation, and for pre surgery workup        Per patient has reflux daily if not on medication. If she takes pantoprazole well controlled. Has been on PPI for a long time.      Patient denies any GI symptoms of nausea, vomiting, dyspepsia, dysphagia, hematemesis, abdominal pain, change in bowel habits, thin stools, hematochezia, or melena.  Additionally there is no weight loss and no reported history of chest pain or shortness of breath.  -Denies family history of colon cancer.  -Denies significant constipation issues. Goes 2-3 times a day     Denies adverse reaction to anesthesia     Prior endoscopies:  EGD-- Tino Denise    EGD with [facility]ENDLETON      Findings:          Acid exposure: Total                3.6%    (normal <4.9)                            Acid exposure Upright                          5.1%    (normal <7.3)                            Acid exposure Supine              1.9%    (normal <1.4)                               DeMeester score day one 13.4                            DeMeester score day two 22.6                            DeMeester score day three 17.6                            DeMeester score day four 4.4                            Total DeMeester  13.4  (normal <14.7)                               Symptom association summary:                                         Heartburn symptom index        42.9     symptom association  100                                         Chest Pain symptom index      18.2    symptom association  100                                         Regurgitation symptom index  23.3    symptom association  100     Impression: Though the overall DeeMester score is <14.7 she had one very low reading and two days where it was very elevated. Her symptom association is 100%. She often does have symptoms without events so will assess her motility with a video swallow evaluation but if no motility issues she would likely benefit from hernia repair surgery    In office today, pt presents for screening colonoscopy    Overall feels well. Changed bowel habits after bypass.  Denies any GI symptoms of abdominal pain, nausea, vomiting, change in bowel habits, dyspepsia, dysphagia, hematemesis, hematochezia, or melena. Patient has a bowel movement each day but rare constipation. Additionally there is no change of appetite, unexpected weight loss, and no reported history of chest pain or shortness of breath.    Has lost over 115 lbs since having surgery    Wt Readings from Last 20 Encounters:   01/03/24 212 lb (96.2 kg)   11/08/23 215 lb (97.5 kg)   11/01/23 221 lb (100.2 kg)   10/31/23 221 lb (100.2 kg)   09/13/23 218 lb (98.9 kg)   07/25/23 224 lb (101.6 kg)   07/25/23 224 lb (101.6 kg)   10/07/22 259 lb 6.4 oz (117.7 kg)   08/25/22 266 lb (120.7 kg)   08/20/22 270 lb (122.5 kg)   08/18/22 270 lb (122.5 kg)   03/29/22 (!) 315 lb (142.9 kg)   03/11/22 (!) 311 lb (141.1 kg)   03/08/22 (!) 311 lb (141.1 kg)   02/13/22 (!) 315 lb (142.9 kg)   01/21/22 (!) 315 lb (142.9 kg)   01/11/22 (!) 315 lb (142.9 kg)   11/30/21 (!) 315 lb (142.9 kg)   11/15/21 (!) 325 lb (147.4 kg)   11/08/21 (!) 325 lb 9.6 oz (147.7 kg)         Pertinent Surgical Hx:  - See additional surgical hx below  - No known issues with sedation.  - No known history of sleep apnea.    Pertinent Family Hx:  - denies family hx of esophageal, gastric or  colon cancer  - denies family history of IBD.    Social Hx:  - No smoking/etoh  - Denies illicit drug use   - NSAIDs/ASA use: PRN  - HAZEL florentin window treatment        History, Medications, Allergies, ROS:      Past Medical History:   Diagnosis Date    Anxiety     Chicken pox     Depression     Extrinsic asthma, unspecified     H/O seasonal allergies     Hyperopia with presbyopia of both eyes 2021    Migraines     Morbid obesity with BMI of 50.0-59.9, adult (HCC)     ANJELICA on CPAP     Ovarian cyst     Sleep apnea     Varicose vein       Past Surgical History:   Procedure Laterality Date      2013    CYST ASPIRATION RIGHT      LAPAROSCOPY, GASTRIC RESTRICTIVE PROCEDURE, WITH GASTRIC BYPASS FOR MORBID  2022    MYOMECTOMY 5/> INTRAMURAL MYOMAS &/OR TOTAL WT >250 GMS, ABDOMINAL APPROACH      SKIN SURGERY  2017    R breast sebaceous cyst      Family History   Problem Relation Age of Onset    Hypertension Father     Lipids Father         HYPERLIPIDEMIA    Diabetes Father     Psychiatric Mother     Cataracts Mother     Macular degeneration Neg     Glaucoma Neg     Retinal detachment Neg       Social History:   Social History     Socioeconomic History    Marital status:     Number of children: 1   Occupational History    Occupation:     Occupation: [employer]   Tobacco Use    Smoking status: Former     Packs/day: 0.00     Years: 20.00     Additional pack years: 0.00     Total pack years: 0.00     Types: Cigarettes     Quit date: 2013     Years since quittin.0     Passive exposure: Past    Smokeless tobacco: Never   Vaping Use    Vaping Use: Never used   Substance and Sexual Activity    Alcohol use: Not Currently     Alcohol/week: 0.0 standard drinks of alcohol     Comment: Not frequently    Drug use: No    Sexual activity: Not Currently     Partners: Male   Other Topics Concern    Caffeine Concern Yes     Comment: 1-2 diet soda daily, 1 cup of  coffee daily    Sleep Concern Yes     Comment: wakes several times    Exercise No    Pt has a pacemaker No    Pt has a defibrillator No    Reaction to local anesthetic No   Social History Narrative    The patient does not use an assistive device..      The patient does live in a home with stairs.        Medications (Active prior to today's visit):  Current Outpatient Medications   Medication Sig Dispense Refill    venlafaxine ER 75 MG Oral Capsule SR 24 Hr Take 1 capsule (75 mg total) by mouth daily. 90 capsule 0    Fremanezumab-vfrm (AJOVY) 225 MG/1.5ML Subcutaneous Solution Prefilled Syringe Inject 1.5 mL into the skin every 30 (thirty) days. 1.5 mL 3    ubrogepant (UBRELVY) 100 MG Oral Tab Take one tablet at onset of migraine.  May take additional tablet in 2 hours if needed.  Do not exceed two tablets per 24 hour period. 10 tablet 0    COLLAGEN OR Take by mouth.      TURMERIC OR Take by mouth.      Multiple Vitamins-Minerals (MULTI-VITAMIN/MINERALS) Oral Tab Take 1 tablet by mouth daily.      methylPREDNISolone 4 MG Oral Tablet Therapy Pack Take as directed 21 tablet 0    guaiFENesin-codeine (CHERATUSSIN AC) 100-10 MG/5ML Oral Solution Take 5 mL by mouth every 6 (six) hours as needed. 118 mL 0       Allergies:  Allergies   Allergen Reactions    Sulfa Antibiotics RASH       ROS:   CONSTITUTIONAL:  negative for fevers, chills  EYES:  negative for change in vision  RESPIRATORY:  negative for  shortness of breath  CARDIOVASCULAR:  negative for  chest pain  GASTROINTESTINAL:  see HPI  GENITOURINARY:  negative for dysuria and hematuria   SKIN:  negative for  rash  ALLERGIC/IMMUNOLOGIC:  negative for hay fever allergies  ENDOCRINE:  negative for cold intolerance and heat intolerance  MUSCULOSKELETAL:  negative for  joint stiffness and joint swelling  BEHAVIOR/PSYCH:  negative for depressed mood    PHYSICAL EXAM:   Blood pressure 124/78, height 4' 11\" (1.499 m), weight 212 lb (96.2 kg), last menstrual period 10/27/2023,  not currently breastfeeding.    Gen: patient appears comfortable and in no acute distress  HEENT: conjunctiva pink, the sclera appears anicteric, oropharynx clear, mucus membranes appear moist  CV: the extremities are warm and well perfused   Lung: effort normal and breath sounds normal, no respiratory distress, wheezes or rales  GI: soft, non-tender exam in all quadrants without rigidity or guarding, non-distended, no abnormal bowel sounds noted, no masses are palpated  : no CVA tenderness  Skin: dry, warm, no jaundice  Ext: no cyanosis, clubbing or edema is evident.   Neuro: Alert and oriented x4, and patient is having movements of all 4 extremities   Psych: Pt has a normal mood and affect, behavior is normal    Nursing note and vitals reviewed      Labs/Imaging:     Patient's labs and imaging were reviewed and discussed with patient today.  See HPI and A&P for further details.     .  ASSESSMENT/PLAN:   Alix Ordonez is a 48 year old  female, patient of Dr. Yu with history of obesity s/p gastric bypass, who presents for screening colonoscopy     # Average Risk screening: patient is considered average risk for colon cancer (denies family hx of colon cancer) and it is appropriate to proceed with screening colonoscopy. Patient is currently asymptomatic and denies diarrhea, hematochezia, thin-stools or weight loss. We discussed risks/benefits/alternatives to procedure, including CT colonography and stool testing, they want to proceed with colonoscopy.    Recommend:  1. Schedule colonoscopy with [facility] [Diagnosis: screening]    2.  bowel prep from pharmacy (split suprep or trilyte)    3. Continue all medications for procedure except    ** If MAC @ [facility]:    - NO alcohol, recreational drugs nor erectile dysfunction mediations 72 hours before procedure(s)   - NO herbal supplements or weight loss medications x 7 days prior to the procedure(s)    ** If MAC @ [facility] or [facility] twilight - continue all medications as  prescribed      4. Read all bowel prep instructions carefully    5. AVOID seeds, nuts, popcorn, raw fruits and vegetables (cooked is okay) for 2-3 days before procedure      >>>Please note: if you were prescribed Suprep for the bowel prep and it is too expensive or not covered by insurance, it is okay to substitute Trilyte (or any similar generic prep). This can be done by notifying the pharmacy or calling our office.       Colonoscopy consent: I have discussed the risks (including risk of delayed/missed diagnosis), benefits, delayed/missed diagnosis, and alternatives to colonoscopy with the patient [who demonstrated understanding], including but not limited to the risks of bleeding, infection, pain, as well as the risks of anesthesia and perforation all leading to prolonged hospitalization or surgical intervention. I also specifically mentioned the miss rate of colonoscopy of 5-10% in the best of all circumstances.  It is the patient's responsibility to contact his/her insurance company regarding questions about out-of-pocket cost/benefits and was provided the appropriate diagnostic information/codes.  All questions were answered to the patient’s satisfaction. The patient signed informed consent and elected to proceed with colonoscopy with intervention [i.e. polypectomy, stent placement, etc.] as indicated.            Orders This Visit:  No orders of the defined types were placed in this encounter.      Meds This Visit:  Requested Prescriptions      No prescriptions requested or ordered in this encounter       Imaging & Referrals:  None

## 2024-01-03 NOTE — PATIENT INSTRUCTIONS
1. Schedule colonoscopy with [facility] [Diagnosis: screening]    2.  bowel prep from pharmacy (split suprep or trilyte)    3. Continue all medications for procedure except    ** If MAC @ [facility]:    - NO alcohol, recreational drugs nor erectile dysfunction mediations 72 hours before procedure(s)   - NO herbal supplements or weight loss medications x 7 days prior to the procedure(s)    ** If MAC @ [facility] or  twilit - continue all medications as prescribed      4. Read all bowel prep instructions carefully    5. AVOID seeds, nuts, popcorn, raw fruits and vegetables (cooked is okay) for 2-3 days before procedure      >>>Please note: if you were prescribed Suprep for the bowel prep and it is too expensive or not covered by insurance, it is okay to substitute Trilyte (or any similar generic prep). This can be done by notifying the pharmacy or calling our office.

## 2024-01-03 NOTE — TELEPHONE ENCOUNTER
Scheduled for:  Colonoscopy [id number]  Provider Name:  Dr. Gloria  Date:  5/6/2024  Location:  [facility]  Sedation:  MAC  Time:  11:15 am, arrival 10:15 am  Prep:  Gavilyte  Meds/Allergies Reconciled?:  Physician reviewed  Diagnosis with codes:  Screening for colon cancer Z12.11  Was patient informed to call insurance with codes (Y/N):  Yes  Referral sent?:  Referral was sent at the time of electronic surgical scheduling.  [facility] or [facility] notified?:  I sent an electronic request to Endo Scheduling and received a confirmation today.    Medication Orders:  N/A  Misc Orders:  N/A     Further instructions given by staff:  Prep instructions were given to pt in the office, pt verbalized understanding.

## 2024-01-03 NOTE — PROGRESS NOTES
Diagnosis:   Plantar fasciitis, left (M72.2), Primary osteoarthritis of both knees (M17.0)        Referring Provider: Amol  Date of Evaluation:    12/1/2023    Precautions:  None Next MD visit:   none scheduled  Date of Surgery: n/a   Insurance Primary/Secondary: BLUE CROSS MEDICAID / N/A     # Auth Visits: 6 by 1/30/2024            Subjective: Slept a lot yesterday so her body must have been tired.  Overall, feeling a bit better, but still concerned about making it 5 more years before being considered for surgery. Did set up home exercise equipment.  Needs to practice.    Pain: 6/10      Objective: See below treatment grid.      Assessment:  General fatigue, improving foot/ankle symptoms.  Variable knee symptoms.      Goals:   - Pt to report minimal to no pain L foot/ankle  - Pt to report minimal discomfort B knees  - Pt to report ability to tolerate full day at work without increased pain  - LE MMT grossly at least 4+/5    Plan: Seek additional insurance Authorization.  Date: 12/6/2023  TX#: 2/6 Date:  12/8/2023             TX#: 3/6 Date:   12/19/2023         TX#: 4/6 Date: 12/26/2023      TX#: 5/6 Date: 1/3/2024  Tx#: 6/6   Ther Ex (30')  - NuStep L5 * 8'  - Toe Scrunches, spreading, toe yoga (alt ext great vs lesser toes)  - towel grabbing with toes  - SLR's: hip flex, ext, abd  - LAQ's Ther Ex (45')  - NuStep L5 * 8'  - Toe Scrunches, spreading, toe yoga (alt ext great vs lesser toes)  - towel grabbing with toes  - SLR's: hip flex, ext, abd  - Ankle DF, PF, Inv, Ever with TB  - Shuttle leg press:     - B 7bands x30     - R/L 5bands x30 ea Ther Ex (45')  - NuStep L5 * 8'  - Toe Scrunches, spreading, toe yoga (alt ext great vs lesser toes)  - towel grabbing with toes  - SLR's: hip flex, ext, abd  - Ankle DF, PF, Inv, Ever with TB  - Shuttle leg press:     - B 8bands x30     - R/L 5bands x30 ea Ther Ex (35')  - NuStep L5 * 8'  - Toe Scrunches, spreading, toe yoga (alt ext great vs lesser toes)  - towel  grabbing with toes  - SLR's: hip flex, ext, abd  - Ankle DF, PF, Inv, Ever with TB  - Shuttle leg press:     - B 8bands 2x30     - R/L 5bands 2x30 ea Ther Ex (35')  - NuStep L5 * 8'  - Toe Scrunches, spreading, toe yoga (alt ext great vs lesser toes)  - towel grabbing with toes  - SLR's: hip flex, ext, abd  - Ankle DF, PF, Inv, Ever with TB  - Shuttle leg press:     - B 8bands 2x30     - R/L 5bands 2x30 ea      Neuro Re-Ed (10')  - Lele board AP and lat  - SLS on level, EO Neuro Re-Ed (10')  - Lele board AP and lat  - SLS on level, EO       LEFS completed - 50          HEP: [id number]    Charges: TEx2, Neuro       Total Timed Treatment: 45 min  Total Treatment Time: 45 min

## 2024-01-04 ENCOUNTER — TELEPHONE (OUTPATIENT)
Dept: BARIATRICS/WEIGHT MGMT | Age: 49
End: 2024-01-04

## 2024-01-04 DIAGNOSIS — E66.01 MORBID OBESITY WITH BODY MASS INDEX OF 60.0-69.9 IN ADULT (CMD): ICD-10-CM

## 2024-01-04 DIAGNOSIS — Z98.84 S/P GASTRIC BYPASS: ICD-10-CM

## 2024-01-04 DIAGNOSIS — E88.810 INSULIN RESISTANCE SYNDROME: Primary | ICD-10-CM

## 2024-01-04 DIAGNOSIS — E55.9 VITAMIN D DEFICIENCY: ICD-10-CM

## 2024-01-05 ENCOUNTER — APPOINTMENT (OUTPATIENT)
Dept: PHYSICAL THERAPY | Age: 49
End: 2024-01-05
Attending: ORTHOPAEDIC SURGERY
Payer: MEDICAID

## 2024-01-05 ENCOUNTER — E-ADVICE (OUTPATIENT)
Dept: BARIATRICS/WEIGHT MGMT | Age: 49
End: 2024-01-05

## 2024-01-08 ENCOUNTER — APPOINTMENT (OUTPATIENT)
Dept: PHYSICAL THERAPY | Age: 49
End: 2024-01-08
Attending: ORTHOPAEDIC SURGERY
Payer: MEDICAID

## 2024-01-09 ENCOUNTER — APPOINTMENT (OUTPATIENT)
Dept: PHYSICAL THERAPY | Age: 49
End: 2024-01-09
Attending: STUDENT IN AN ORGANIZED HEALTH CARE EDUCATION/TRAINING PROGRAM
Payer: MEDICAID

## 2024-01-09 ENCOUNTER — TELEPHONE (OUTPATIENT)
Dept: PHYSICAL THERAPY | Facility: HOSPITAL | Age: 49
End: 2024-01-09

## 2024-01-10 ENCOUNTER — APPOINTMENT (OUTPATIENT)
Dept: BARIATRICS/WEIGHT MGMT | Age: 49
End: 2024-01-10

## 2024-01-10 ENCOUNTER — APPOINTMENT (OUTPATIENT)
Dept: PHYSICAL THERAPY | Age: 49
End: 2024-01-10
Attending: ORTHOPAEDIC SURGERY
Payer: MEDICAID

## 2024-01-16 ENCOUNTER — OFFICE VISIT (OUTPATIENT)
Dept: PHYSICAL THERAPY | Age: 49
End: 2024-01-16
Attending: STUDENT IN AN ORGANIZED HEALTH CARE EDUCATION/TRAINING PROGRAM
Payer: MEDICAID

## 2024-01-16 PROCEDURE — 97112 NEUROMUSCULAR REEDUCATION: CPT

## 2024-01-16 PROCEDURE — 97110 THERAPEUTIC EXERCISES: CPT

## 2024-01-16 NOTE — PROGRESS NOTES
Diagnosis:   Plantar fasciitis, left (M72.2), Primary osteoarthritis of both knees (M17.0)        Referring Provider: Amol  Date of Evaluation:    12/1/2023    Precautions:  None Next MD visit:   none scheduled  Date of Surgery: n/a   Insurance Primary/Secondary: BLUE CROSS MEDICAID / N/A     # Auth Visits: 6 by 1/30/2024            Subjective: Still really tired and lacking motivation, maybe the winter weather.  Feet generally feeling better, and knees too, though still noisy/crunchy.    Pain: 6/10      Objective: See below treatment grid.      Assessment:  Mildly improving symptoms despite low motivation level.      Goals:   - Pt to report minimal to no pain L foot/ankle  - Pt to report minimal discomfort B knees  - Pt to report ability to tolerate full day at work without increased pain  - LE MMT grossly at least 4+/5    Plan: Continue 3-4 more visits.  Date: 1/16/2024  TX#: 7 Date:          TX#:  Date:         TX#:  Date:      TX#:  Date:   Tx#:    Ther Ex (35')  - NuStep L5 * 8'  - Toe Scrunches, spreading, toe yoga (alt ext great vs lesser toes)  - towel grabbing with toes  - SLR's: hip flex, ext, abd  - Ankle DF, PF, Inv, Ever with TB  - Shuttle leg press:     - B 8bands 2x30     - R/L 5bands 2x30 ea       Neuro Re-Ed (10')  - Lele board AP and lat  - SLS on level, EO                     HEP: [id number]    Charges: TEx2, Neuro       Total Timed Treatment: 45 min  Total Treatment Time: 45 min

## 2024-01-23 ENCOUNTER — LAB SERVICES (OUTPATIENT)
Dept: LAB | Age: 49
End: 2024-01-23

## 2024-01-23 ENCOUNTER — OFFICE VISIT (OUTPATIENT)
Dept: PHYSICAL THERAPY | Age: 49
End: 2024-01-23
Attending: STUDENT IN AN ORGANIZED HEALTH CARE EDUCATION/TRAINING PROGRAM
Payer: MEDICAID

## 2024-01-23 ENCOUNTER — APPOINTMENT (OUTPATIENT)
Dept: BARIATRICS/WEIGHT MGMT | Age: 49
End: 2024-01-23

## 2024-01-23 DIAGNOSIS — M72.2 PLANTAR FASCIITIS, LEFT: Primary | ICD-10-CM

## 2024-01-23 DIAGNOSIS — E88.810 INSULIN RESISTANCE SYNDROME: ICD-10-CM

## 2024-01-23 DIAGNOSIS — G43.001 MIGRAINE WITHOUT AURA AND WITH STATUS MIGRAINOSUS, NOT INTRACTABLE: ICD-10-CM

## 2024-01-23 DIAGNOSIS — E55.9 VITAMIN D DEFICIENCY: ICD-10-CM

## 2024-01-23 DIAGNOSIS — Z98.84 S/P GASTRIC BYPASS: ICD-10-CM

## 2024-01-23 DIAGNOSIS — F41.1 GENERALIZED ANXIETY DISORDER: ICD-10-CM

## 2024-01-23 DIAGNOSIS — E66.01 SEVERE OBESITY (CMD): ICD-10-CM

## 2024-01-23 DIAGNOSIS — Z98.84 S/P GASTRIC BYPASS: Primary | ICD-10-CM

## 2024-01-23 DIAGNOSIS — G47.33 OSA ON CPAP: ICD-10-CM

## 2024-01-23 DIAGNOSIS — E66.01 MORBID OBESITY WITH BODY MASS INDEX OF 60.0-69.9 IN ADULT (CMD): ICD-10-CM

## 2024-01-23 LAB
25(OH)D3+25(OH)D2 SERPL-MCNC: 49.6 NG/ML (ref 30–100)
ALBUMIN SERPL-MCNC: 3.8 G/DL (ref 3.6–5.1)
ALBUMIN/GLOB SERPL: 1.1 {RATIO} (ref 1–2.4)
ALP SERPL-CCNC: 91 UNITS/L (ref 45–117)
ALT SERPL-CCNC: 34 UNITS/L
ANION GAP SERPL CALC-SCNC: 5 MMOL/L (ref 7–19)
AST SERPL-CCNC: 18 UNITS/L
BASOPHILS # BLD: 0 K/MCL (ref 0–0.3)
BASOPHILS NFR BLD: 1 %
BILIRUB SERPL-MCNC: 0.8 MG/DL (ref 0.2–1)
BUN SERPL-MCNC: 21 MG/DL (ref 6–20)
BUN/CREAT SERPL: 35 (ref 7–25)
CALCIUM SERPL-MCNC: 8.7 MG/DL (ref 8.4–10.2)
CHLORIDE SERPL-SCNC: 107 MMOL/L (ref 97–110)
CO2 SERPL-SCNC: 32 MMOL/L (ref 21–32)
CREAT SERPL-MCNC: 0.6 MG/DL (ref 0.51–0.95)
DEPRECATED RDW RBC: 41.1 FL (ref 39–50)
EGFRCR SERPLBLD CKD-EPI 2021: >90 ML/MIN/{1.73_M2}
EOSINOPHIL # BLD: 0.1 K/MCL (ref 0–0.5)
EOSINOPHIL NFR BLD: 3 %
ERYTHROCYTE [DISTWIDTH] IN BLOOD: 12.3 % (ref 11–15)
FASTING DURATION TIME PATIENT: 10 HOURS (ref 0–999)
FERRITIN SERPL-MCNC: 180 NG/ML (ref 8–252)
FOLATE SERPL-MCNC: >24 NG/ML
GLOBULIN SER-MCNC: 3.4 G/DL (ref 2–4)
GLUCOSE SERPL-MCNC: 89 MG/DL (ref 70–99)
HBA1C MFR BLD: 5 % (ref 4.5–5.6)
HCT VFR BLD CALC: 43.4 % (ref 36–46.5)
HGB BLD-MCNC: 13.6 G/DL (ref 12–15.5)
IMM GRANULOCYTES # BLD AUTO: 0 K/MCL (ref 0–0.2)
IMM GRANULOCYTES # BLD: 0 %
IRON SATN MFR SERPL: 48 % (ref 15–45)
IRON SERPL-MCNC: 130 MCG/DL (ref 50–170)
LYMPHOCYTES # BLD: 1.6 K/MCL (ref 1–4.8)
LYMPHOCYTES NFR BLD: 38 %
MCH RBC QN AUTO: 28.6 PG (ref 26–34)
MCHC RBC AUTO-ENTMCNC: 31.3 G/DL (ref 32–36.5)
MCV RBC AUTO: 91.2 FL (ref 78–100)
MONOCYTES # BLD: 0.3 K/MCL (ref 0.3–0.9)
MONOCYTES NFR BLD: 7 %
NEUTROPHILS # BLD: 2.2 K/MCL (ref 1.8–7.7)
NEUTROPHILS NFR BLD: 51 %
NRBC BLD MANUAL-RTO: 0 /100 WBC
PHOSPHATE SERPL-MCNC: 4 MG/DL (ref 2.4–4.7)
PLATELET # BLD AUTO: 198 K/MCL (ref 140–450)
POTASSIUM SERPL-SCNC: 4.4 MMOL/L (ref 3.4–5.1)
PROT SERPL-MCNC: 7.2 G/DL (ref 6.4–8.2)
RBC # BLD: 4.76 MIL/MCL (ref 4–5.2)
SODIUM SERPL-SCNC: 140 MMOL/L (ref 135–145)
TIBC SERPL-MCNC: 273 MCG/DL (ref 250–450)
VIT B12 SERPL-MCNC: 824 PG/ML (ref 211–911)
WBC # BLD: 4.3 K/MCL (ref 4.2–11)

## 2024-01-23 PROCEDURE — 36415 COLL VENOUS BLD VENIPUNCTURE: CPT | Performed by: NURSE PRACTITIONER

## 2024-01-23 PROCEDURE — 82728 ASSAY OF FERRITIN: CPT | Performed by: INTERNAL MEDICINE

## 2024-01-23 PROCEDURE — 83036 HEMOGLOBIN GLYCOSYLATED A1C: CPT | Performed by: CLINICAL MEDICAL LABORATORY

## 2024-01-23 PROCEDURE — 82306 VITAMIN D 25 HYDROXY: CPT | Performed by: CLINICAL MEDICAL LABORATORY

## 2024-01-23 PROCEDURE — 82746 ASSAY OF FOLIC ACID SERUM: CPT | Performed by: CLINICAL MEDICAL LABORATORY

## 2024-01-23 PROCEDURE — 97112 NEUROMUSCULAR REEDUCATION: CPT

## 2024-01-23 PROCEDURE — 80053 COMPREHEN METABOLIC PANEL: CPT | Performed by: INTERNAL MEDICINE

## 2024-01-23 PROCEDURE — 83970 ASSAY OF PARATHORMONE: CPT | Performed by: CLINICAL MEDICAL LABORATORY

## 2024-01-23 PROCEDURE — 84100 ASSAY OF PHOSPHORUS: CPT | Performed by: INTERNAL MEDICINE

## 2024-01-23 PROCEDURE — 85025 COMPLETE CBC W/AUTO DIFF WBC: CPT | Performed by: INTERNAL MEDICINE

## 2024-01-23 PROCEDURE — 83540 ASSAY OF IRON: CPT | Performed by: INTERNAL MEDICINE

## 2024-01-23 PROCEDURE — 82607 VITAMIN B-12: CPT | Performed by: CLINICAL MEDICAL LABORATORY

## 2024-01-23 PROCEDURE — 84425 ASSAY OF VITAMIN B-1: CPT | Performed by: CLINICAL MEDICAL LABORATORY

## 2024-01-23 PROCEDURE — 97110 THERAPEUTIC EXERCISES: CPT

## 2024-01-23 PROCEDURE — 83525 ASSAY OF INSULIN: CPT | Performed by: CLINICAL MEDICAL LABORATORY

## 2024-01-23 PROCEDURE — 83550 IRON BINDING TEST: CPT | Performed by: INTERNAL MEDICINE

## 2024-01-23 NOTE — PROGRESS NOTES
Diagnosis:   Plantar fasciitis, left (M72.2), Primary osteoarthritis of both knees (M17.0)        Referring Provider: Amol  Date of Evaluation:    12/1/2023    Precautions:  None Next MD visit:   none scheduled  Date of Surgery: n/a   Insurance Primary/Secondary: BLUE CROSS MEDICAID / N/A     # Auth Visits: 6 by 1/30/2024            Subjective: Knees doing noticeably better.  Still having crepitus, in fact a lot, but less and less knee pain.  R foot improving too.  Less pain under heel/foot (plantar fasica), but continued discomfort dorsum of foot and medially.  Has noticed the new shoes (Hoka) have helped too.    Pain: 6/10      Objective: See below treatment grid.      Assessment:  Knees significantly improved and L foot mildly/moderately improved.      Goals:   - Pt to report minimal to no pain L foot/ankle  - Pt to report minimal discomfort B knees  - Pt to report ability to tolerate full day at work without increased pain  - LE MMT grossly at least 4+/5    Plan: Continue 2 more visits.  Date: 1/16/2024  TX#: 7 Date:   1/23/2024    TX#: 8 Date:         TX#:  Date:      TX#:  Date:   Tx#:    Ther Ex (35')  - NuStep L5 * 8'  - Toe Scrunches, spreading, toe yoga (alt ext great vs lesser toes)  - towel grabbing with toes  - SLR's: hip flex, ext, abd  - Ankle DF, PF, Inv, Ever with TB  - Shuttle leg press:     - B 8bands 2x30     - R/L 5bands 2x30 ea Ther Ex (35')  - NuStep L5 * 8'  - Toe Scrunches, spreading, toe yoga (alt ext great vs lesser toes)  - towel grabbing with toes  - SLR's: hip flex, ext, abd  - Ankle DF, PF, Inv, Ever with TB  - Shuttle leg press:     - B 8bands 2x30     - R/L 6bands 2x30 ea      Neuro Re-Ed (10')  - Lele board AP and lat  - SLS on level, EO Neuro Re-Ed (10')  - Lele board AP and lat  - SLS on level, EO  - Rub pat tap L dorsum of foot and medially                    HEP: [id number]    Charges: TEx2, Neuro       Total Timed Treatment: 45 min  Total Treatment Time: 45 min

## 2024-01-24 ENCOUNTER — HOSPITAL ENCOUNTER (OUTPATIENT)
Dept: MAMMOGRAPHY | Age: 49
Discharge: HOME | End: 2024-01-24
Attending: OBSTETRICS & GYNECOLOGY
Payer: MEDICAID

## 2024-01-24 ENCOUNTER — TELEPHONE (OUTPATIENT)
Dept: ORTHOPEDICS CLINIC | Facility: CLINIC | Age: 49
End: 2024-01-24

## 2024-01-24 DIAGNOSIS — Z12.31 ENCOUNTER FOR SCREENING MAMMOGRAM FOR MALIGNANT NEOPLASM OF BREAST: ICD-10-CM

## 2024-01-24 LAB
FASTING DURATION TIME PATIENT: NORMAL H
INSULIN P FAST SERPL-ACNC: 5 MUNITS/L (ref 3–28)
PTH-INTACT SERPL-MCNC: 54 PG/ML (ref 19–88)

## 2024-01-24 PROCEDURE — 77063 BREAST TOMOSYNTHESIS BI: CPT | Performed by: OBSTETRICS & GYNECOLOGY

## 2024-01-24 PROCEDURE — 77067 SCR MAMMO BI INCL CAD: CPT | Performed by: OBSTETRICS & GYNECOLOGY

## 2024-01-27 LAB — VIT B1 PYROPHOSHATE BLD-SCNC: 151 NMOL/L (ref 70–180)

## 2024-01-30 ENCOUNTER — TELEPHONE (OUTPATIENT)
Dept: PHYSICAL THERAPY | Facility: HOSPITAL | Age: 49
End: 2024-01-30

## 2024-01-30 ENCOUNTER — APPOINTMENT (OUTPATIENT)
Dept: PHYSICAL THERAPY | Age: 49
End: 2024-01-30
Attending: STUDENT IN AN ORGANIZED HEALTH CARE EDUCATION/TRAINING PROGRAM
Payer: MEDICAID

## 2024-02-07 ENCOUNTER — OFFICE VISIT (OUTPATIENT)
Dept: ORTHOPEDICS CLINIC | Facility: CLINIC | Age: 49
End: 2024-02-07
Payer: MEDICAID

## 2024-02-07 VITALS — SYSTOLIC BLOOD PRESSURE: 121 MMHG | HEART RATE: 78 BPM | DIASTOLIC BLOOD PRESSURE: 74 MMHG

## 2024-02-07 DIAGNOSIS — M17.0 PRIMARY OSTEOARTHRITIS OF BOTH KNEES: Primary | ICD-10-CM

## 2024-02-07 PROCEDURE — 99213 OFFICE O/P EST LOW 20 MIN: CPT | Performed by: ORTHOPAEDIC SURGERY

## 2024-02-07 PROCEDURE — 20610 DRAIN/INJ JOINT/BURSA W/O US: CPT | Performed by: ORTHOPAEDIC SURGERY

## 2024-02-07 NOTE — PROGRESS NOTES
NURSING INTAKE COMMENTS:   Chief Complaint   Patient presents with    Follow - Up     Follow up on bilateral knee pain. Patient wants to do right knee durolane injection today. Rates her pain 3/10 at this time.        HPI: This 48 year old female presents today with complaints of right knee pain follow-up.  She continues to have pain at the end of her day.  She is interested in the Durolane injection today.  No recent injury to the knee.  No recent immunizations    Past Medical History:   Diagnosis Date    Anxiety     Chicken pox     Depression     Extrinsic asthma, unspecified     H/O seasonal allergies     Hyperopia with presbyopia of both eyes 2021    Migraines     Morbid obesity with BMI of 50.0-59.9, adult (HCC)     ANJELICA on CPAP     Ovarian cyst     Sleep apnea     Varicose vein      Past Surgical History:   Procedure Laterality Date      2013    CYST ASPIRATION RIGHT      LAPAROSCOPY, GASTRIC RESTRICTIVE PROCEDURE, WITH GASTRIC BYPASS FOR MORBID  2022    MYOMECTOMY 5/> INTRAMURAL MYOMAS &/OR TOTAL WT >250 GMS, ABDOMINAL APPROACH      SKIN SURGERY  2017    R breast sebaceous cyst     Current Outpatient Medications   Medication Sig Dispense Refill    venlafaxine ER 75 MG Oral Capsule SR 24 Hr Take 1 capsule (75 mg total) by mouth daily. 90 capsule 0    Fremanezumab-vfrm (AJOVY) 225 MG/1.5ML Subcutaneous Solution Prefilled Syringe Inject 1.5 mL into the skin every 30 (thirty) days. 1.5 mL 3    ubrogepant (UBRELVY) 100 MG Oral Tab Take one tablet at onset of migraine.  May take additional tablet in 2 hours if needed.  Do not exceed two tablets per 24 hour period. 10 tablet 0    COLLAGEN OR Take by mouth.      TURMERIC OR Take by mouth.      Multiple Vitamins-Minerals (MULTI-VITAMIN/MINERALS) Oral Tab Take 1 tablet by mouth daily.      methylPREDNISolone 4 MG Oral Tablet Therapy Pack Take as directed 21 tablet 0    guaiFENesin-codeine (CHERATUSSIN AC) 100-10 MG/5ML Oral Solution  Take 5 mL by mouth every 6 (six) hours as needed. 118 mL 0     Allergies   Allergen Reactions    Sulfa Antibiotics RASH     Family History   Problem Relation Age of Onset    Hypertension Father     Lipids Father         HYPERLIPIDEMIA    Diabetes Father     Psychiatric Mother     Cataracts Mother     Macular degeneration Neg     Glaucoma Neg     Retinal detachment Neg        Social History     Occupational History    Occupation:     Occupation: [employer] Rep   Tobacco Use    Smoking status: Former     Packs/day: 0.00     Years: 20.00     Additional pack years: 0.00     Total pack years: 0.00     Types: Cigarettes     Quit date: 2013     Years since quittin.1     Passive exposure: Past    Smokeless tobacco: Never   Vaping Use    Vaping Use: Never used   Substance and Sexual Activity    Alcohol use: Not Currently     Alcohol/week: 0.0 standard drinks of alcohol     Comment: Not frequently    Drug use: No    Sexual activity: Not Currently     Partners: Male        Review of Systems:  GENERAL: denies fevers, chills, night sweats, fatigue, unintentional weight loss/gain  SKIN: denies skin lesions, open sores, rash  HEENT:denies recent vision change, new nasal congestion,hearing loss, tinnitus, sore throat, headaches  RESPIRATORY: denies new shortness of breath, cough, asthma, wheezing  CARDIOVASCULAR: denies chest pain, leg cramps with exertion, palpitations, leg swelling  GI: denies abdominal pain, nausea, vomiting, diarrhea, constipation, hematochezia, worsening heartburn or stomach ulcers  : denies dysuria, hematuria, incontinence, increased frequency, urgency, difficulty urinating  MUSCULOSKELETAL: denies musculoskeletal complaints other than in HPI  NEURO: denies numbness, tingling, weakness, balance issues, dizziness, memory loss  PSYCHIATRIC: denies Hx of depression, anxiety, other psychiatric disorders  HEMATOLOGIC: denies blood clots, anemia, blood clotting disorders, blood  transfusion  ENDOCRINE: denies autoimmune disease, thyroid issues, or diabetes  ALLERGY: denies asthma, seasonal allergies    Physical Examination:    /74 (BP Location: Right arm, Patient Position: Sitting, Cuff Size: adult)   Pulse 78   LMP 11/29/2023 (Approximate)   Constitutional: appears well hydrated, alert and responsive, no acute distress noted  Extremities: Right knee trace effusion.  Tender medial and lateral joint lines.  Neurological: Unchanged    Imaging:   [facility] JOHNNIE 2D+3D SCREENING BILAT (CPT=77067/19201)    Result Date: 1/24/2024  PROCEDURE: [facility] JOHNNIE 2D+3D SCREENING BILAT ([id number])  COMPARISON: [facility], [facility] JOHNNIE 2D+3D DIAGNOSTIC FLAVIO LEFT (CPT=77065/09009), 6/20/2019, 2:10 PM.  [facility], [facility] JOHNNIE 2D+3D SCREENING BILAT ([id number]), 7/29/2020, 1:34 PM.  [facility], F[facility] JOHNNIE 2D+3D SCREENING BILAT ([id number]), 10/29/2021, 11:32 AM.  [facility], F[facility] JOHNNIE 2D+3D SCREENING BILAT ([id number]), 10/31/2022, 9:18 AM.  INDICATIONS: Z12.31 Encounter for screening mammogram for malignant neoplasm of breast  TECHNIQUE:  Full field direct screening mammography was performed and images were reviewed with the [employer] CHANDA 1.5.1.5 CAD device.  3D tomosynthesis was performed and reviewed   BREAST COMPOSITION:   Category b-Scattered areas fibroglandular density.   FINDINGS: There is no suspicious asymmetry, mass, architectural distortion, or microcalcifications identified in either breast.           CONCLUSION:   There is no mammographic evidence of malignancy in either breast. As long as patient's clinical breast exam remains unchanged, annual screening mammogram is recommended.  BI-RADS CATEGORY:   DIAGNOSTIC CATEGORY 1--NEGATIVE ASSESSMENT.   RECOMMENDATIONS:  ROUTINE MAMMOGRAM AND CLINICAL EVALUATION IN 12 MONTHS.       PLEASE NOTE: NORMAL MAMMOGRAM DOES NOT EXCLUDE THE POSSIBILITY OF  BREAST CANCER.  A CLINICALLY SUSPICIOUS PALPABLE LUMP SHOULD BE BIOPSIED.   For patients over the age of 40, the target due date for the patient's next mammogram has been entered into a reminder system.   Patient received a discharge summary from the technologist after completion of exam.  Breast marker legend used on images  Triangle = Palpable lump [place] = Skin tag or mole BB = Nipple Linear bina = Scar Square = Pain    Dictated by (CST): Sterling Gomez DO on 1/24/2024 at 11:35 AM     Finalized by (CST): Sterling Gomez DO on 1/24/2024 at 11:36 AM             Labs:  Lab Results   Component Value Date    WBC 5.7 11/08/2023    HGB 13.2 11/08/2023    .0 11/08/2023      Lab Results   Component Value Date    GLU 86 11/08/2023    BUN 19 11/08/2023    CREATSERUM 0.60 11/08/2023    GFRNAA 65 02/13/2022    GFRAA 75 02/13/2022        Assessment and Plan:  Diagnoses and all orders for this visit:    Primary osteoarthritis of both knees  -     arthrocentesis major joint  -     sodium hyaluronate (DUROLANE) 60 mg        Assessment: Right knee osteoarthritis, primary    Plan: Using sterile technique and a superolateral parapatellar approach, the right knee was injected with 60 mg of Durolane.  An aspiration was attended but only a few drops of bloody fluid was retrieved.  Recommend follow-up again as needed for the right knee.  She may elect to follow-up in 1 week for a left knee injection.    Follow Up: Return if symptoms worsen or fail to improve.    GABINO MAHAN MD

## 2024-02-14 ENCOUNTER — OFFICE VISIT (OUTPATIENT)
Dept: ORTHOPEDICS CLINIC | Facility: CLINIC | Age: 49
End: 2024-02-14

## 2024-02-14 VITALS — SYSTOLIC BLOOD PRESSURE: 121 MMHG | HEART RATE: 68 BPM | DIASTOLIC BLOOD PRESSURE: 72 MMHG

## 2024-02-14 DIAGNOSIS — M17.0 PRIMARY OSTEOARTHRITIS OF BOTH KNEES: Primary | ICD-10-CM

## 2024-02-14 PROCEDURE — 20610 DRAIN/INJ JOINT/BURSA W/O US: CPT

## 2024-02-14 NOTE — PROGRESS NOTES
NURSING INTAKE COMMENTS:   Chief Complaint   Patient presents with    Procedure     Pt here for L knee durolane injection - States no pain in knee.        HPI: This 48 year old female presents today with complaints of left knee follow up. She is here for left knee Durolane injection. States she is starting to feel relief in the right knee after Durolane injection last week. She would like to proceed with left knee today.     Past Medical History:   Diagnosis Date    Anxiety     Chicken pox     Depression     Extrinsic asthma, unspecified     H/O seasonal allergies     Hyperopia with presbyopia of both eyes 2021    Migraines     Morbid obesity with BMI of 50.0-59.9, adult (HCC)     ANJELICA on CPAP     Ovarian cyst     Sleep apnea     Varicose vein      Past Surgical History:   Procedure Laterality Date      2013    CYST ASPIRATION RIGHT      LAPAROSCOPY, GASTRIC RESTRICTIVE PROCEDURE, WITH GASTRIC BYPASS FOR MORBID  2022    MYOMECTOMY 5/> INTRAMURAL MYOMAS &/OR TOTAL WT >250 GMS, ABDOMINAL APPROACH      SKIN SURGERY  2017    R breast sebaceous cyst     Current Outpatient Medications   Medication Sig Dispense Refill    venlafaxine ER 75 MG Oral Capsule SR 24 Hr Take 1 capsule (75 mg total) by mouth daily. 90 capsule 0    methylPREDNISolone 4 MG Oral Tablet Therapy Pack Take as directed 21 tablet 0    guaiFENesin-codeine (CHERATUSSIN AC) 100-10 MG/5ML Oral Solution Take 5 mL by mouth every 6 (six) hours as needed. 118 mL 0    Fremanezumab-vfrm (AJOVY) 225 MG/1.5ML Subcutaneous Solution Prefilled Syringe Inject 1.5 mL into the skin every 30 (thirty) days. 1.5 mL 3    ubrogepant (UBRELVY) 100 MG Oral Tab Take one tablet at onset of migraine.  May take additional tablet in 2 hours if needed.  Do not exceed two tablets per 24 hour period. 10 tablet 0    COLLAGEN OR Take by mouth.      TURMERIC OR Take by mouth.      Multiple Vitamins-Minerals (MULTI-VITAMIN/MINERALS) Oral Tab Take 1 tablet by  mouth daily.       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   Tobacco Use    Smoking status: Former     Packs/day: 0.00     Years: 20.00     Additional pack years: 0.00     Total pack years: 0.00     Types: Cigarettes     Quit date: 2013     Years since quittin.1     Passive exposure: Past    Smokeless tobacco: Never   Vaping Use    Vaping Use: Never used   Substance and Sexual Activity    Alcohol use: Not Currently     Alcohol/week: 0.0 standard drinks of alcohol     Comment: Not frequently    Drug use: No    Sexual activity: Not Currently     Partners: Male        Review of Systems:  GENERAL: denies fevers, chills, night sweats, fatigue, unintentional weight loss/gain  SKIN: denies skin lesions, open sores, rash  HEENT:denies recent vision change, new nasal congestion,hearing loss, tinnitus, sore throat, headaches  RESPIRATORY: denies new shortness of breath, cough, asthma, wheezing  CARDIOVASCULAR: denies chest pain, leg cramps with exertion, palpitations, leg swelling  GI: denies abdominal pain, nausea, vomiting, diarrhea, constipation, hematochezia, worsening heartburn or stomach ulcers  : denies dysuria, hematuria, incontinence, increased frequency, urgency, difficulty urinating  MUSCULOSKELETAL: denies musculoskeletal complaints other than in HPI  NEURO: denies numbness, tingling, weakness, balance issues, dizziness, memory loss  PSYCHIATRIC: denies Hx of depression, anxiety, other psychiatric disorders  HEMATOLOGIC: denies blood clots, anemia, blood clotting disorders, blood transfusion  ENDOCRINE: denies autoimmune disease,  thyroid issues, or diabetes  ALLERGY: denies asthma, seasonal allergies    Physical Examination:    /72   Pulse 68   LMP 11/29/2023 (Approximate)   Constitutional: appears well hydrated, alert and responsive, no acute distress noted  Extremities: Left knee exam is unchanged. There is no effusion. No erythema or palpable warmth. No numbness.  Neurological:  Unchanged     Imaging:   [facility] JOHNNIE 2D+3D SCREENING BILAT (CPT=77067/75071)    Result Date: 1/24/2024  PROCEDURE: [facility] JOHNNIE 2D+3D SCREENING BILAT ([id number])  COMPARISON: [facility], [facility] JOHNNIE 2D+3D DIAGNOSTIC FLAVIO LEFT (CPT=[id number]), 6/20/2019, 2:10 PM.  [facility], F[facility] JOHNNIE 2D+3D SCREENING BILAT ([id number]), 7/29/2020, 1:34 PM.  [facility], [facility] JOHNNIE 2D+3D SCREENING BILAT ([id number]), 10/29/2021, 11:32 AM.  [facility], [facility] JOHNNIE 2D+3D SCREENING BILAT ([id number]), 10/31/2022, 9:18 AM.  INDICATIONS: Z12.31 Encounter for screening mammogram for malignant neoplasm of breast  TECHNIQUE:  Full field direct screening mammography was performed and images were reviewed with the [email or web address] CHANDA 1.5.1.5 CAD device.  3D tomosynthesis was performed and reviewed   BREAST COMPOSITION:   Category b-Scattered areas fibroglandular density.   FINDINGS: There is no suspicious asymmetry, mass, architectural distortion, or microcalcifications identified in either breast.           CONCLUSION:   There is no mammographic evidence of malignancy in either breast. As long as patient's clinical breast exam remains unchanged, annual screening mammogram is recommended.  BI-RADS CATEGORY:   DIAGNOSTIC CATEGORY 1--NEGATIVE ASSESSMENT.   RECOMMENDATIONS:  ROUTINE MAMMOGRAM AND CLINICAL EVALUATION IN 12 MONTHS.       PLEASE NOTE: NORMAL MAMMOGRAM DOES NOT EXCLUDE THE POSSIBILITY OF BREAST CANCER.  A CLINICALLY SUSPICIOUS PALPABLE LUMP SHOULD BE BIOPSIED.   For patients  over the age of 40, the target due date for the patient's next mammogram has been entered into a reminder system.   Patient received a discharge summary from the technologist after completion of exam.  Breast marker legend used on images  Triangle = Palpable lump [place] = Skin tag or mole BB = Nipple Linear bina = Scar Square = Pain    Dictated by (CST): Sterling Gomez DO on 1/24/2024 at 11:35 AM     Finalized by (CST): Sterling Gomez DO on 1/24/2024 at 11:36 AM             Labs:  Lab Results   Component Value Date    WBC 5.7 11/08/2023    HGB 13.2 11/08/2023    .0 11/08/2023      Lab Results   Component Value Date    GLU 86 11/08/2023    BUN 19 11/08/2023    CREATSERUM 0.60 11/08/2023    GFRNAA 65 02/13/2022    GFRAA 75 02/13/2022        Assessment and Plan:  Diagnoses and all orders for this visit:    Primary osteoarthritis of both knees  -     Drain/Inject Large Joint/Bursa  -     sodium hyaluronate (DUROLANE) 60 mg        Assessment: Left knee osteoarthritis     Plan: Using sterile technique and superior lateral parapatellar approach, the left knee was injected with Durolane. Patient tolerated procedure well. Advised icing and oral anti-inflammatories as needed. Follow up as needed for knee pain.     Follow Up: Return if symptoms worsen or fail to improve.    Anabell Blount PA-C

## 2024-02-14 NOTE — PROGRESS NOTES
Per verbal order from Dr. Smith draw up 3ml of 0.5% Marcaine & 2ml 1% lidocaine and 1ml of Kenalog 40 for cortisone injection to left knee. Karen PAREDES MA    Patient provided education handout for cortisone injection.

## 2024-02-29 ENCOUNTER — OFFICE VISIT (OUTPATIENT)
Dept: OBGYN CLINIC | Facility: CLINIC | Age: 49
End: 2024-02-29
Payer: MEDICAID

## 2024-02-29 VITALS
DIASTOLIC BLOOD PRESSURE: 81 MMHG | WEIGHT: 218.63 LBS | HEART RATE: 75 BPM | BODY MASS INDEX: 44 KG/M2 | SYSTOLIC BLOOD PRESSURE: 120 MMHG

## 2024-02-29 DIAGNOSIS — Z01.419 ENCOUNTER FOR GYNECOLOGICAL EXAMINATION: Primary | ICD-10-CM

## 2024-02-29 DIAGNOSIS — Z12.31 ENCOUNTER FOR SCREENING MAMMOGRAM FOR BREAST CANCER: ICD-10-CM

## 2024-02-29 DIAGNOSIS — Z12.4 SCREENING FOR CERVICAL CANCER: ICD-10-CM

## 2024-02-29 PROCEDURE — 99396 PREV VISIT EST AGE 40-64: CPT | Performed by: OBSTETRICS & GYNECOLOGY

## 2024-02-29 RX ORDER — CHLORHEXIDINE GLUCONATE ORAL RINSE 1.2 MG/ML
SOLUTION DENTAL
COMMUNITY
Start: 2024-02-20

## 2024-02-29 NOTE — PROGRESS NOTES
Alix Ordonez is a 48 year old female  Patient's last menstrual period was 2023 (approximate). here for annual exam.       Last seen 23.   Last pap 2021 normal with neg HPV    Last mammogram 2024.      Has been on Effexor ER 75mg for hot flashes and anxiety.  Night sweats and anxiety better.  But having more hot flashes.    LMP 23.  Having 3-4 periods/year.      Lost 135 pounds since gastric bypass surgery in .  Considering plastic surgery for loose skin.      OBSTETRICS HISTORY:  OB History    Para Term  AB Living   1 1 1 0 0 1   SAB IAB Ectopic Multiple Live Births   0 0 0 0 1       GYNE HISTORY   Menarche: 13 years old  Pap Date: 21  Pap Result Notes: NEG PAP NEG HPV  Follow Up Recommendation: MAMMO 24 MITCH NEG    MEDICAL HISTORY:  Past Medical History:   Diagnosis Date    Anxiety     Chicken pox     Depression     Extrinsic asthma, unspecified     H/O seasonal allergies     Hyperopia with presbyopia of both eyes 2021    Migraines     Morbid obesity with BMI of 50.0-59.9, adult (HCC)     ANJELICA on CPAP     Ovarian cyst 2012    Sleep apnea     Varicose vein      Past Surgical History:   Procedure Laterality Date      2013    CYST ASPIRATION RIGHT      LAPAROSCOPY, GASTRIC RESTRICTIVE PROCEDURE, WITH GASTRIC BYPASS FOR MORBID  2022    MYOMECTOMY 5/> INTRAMURAL MYOMAS &/OR TOTAL WT >250 GMS, ABDOMINAL APPROACH      SKIN SURGERY  2017    R breast sebaceous cyst       SOCIAL HISTORY:  Social History     Socioeconomic History    Marital status:     Number of children: 1   Occupational History    Occupation:     Occupation: [employer] Rep   Tobacco Use    Smoking status: Former     Packs/day: 0.00     Years: 20.00     Additional pack years: 0.00     Total pack years: 0.00     Types: Cigarettes     Quit date: 2013     Years since quittin.1     Passive exposure: Past    Smokeless tobacco: Never   Vaping  Use    Vaping Use: Never used   Substance and Sexual Activity    Alcohol use: Not Currently     Alcohol/week: 0.0 standard drinks of alcohol     Comment: Not frequently    Drug use: No    Sexual activity: Not Currently     Partners: Male   Other Topics Concern    Caffeine Concern Yes     Comment: 1-2 diet soda daily, 1 cup of coffee daily    Sleep Concern Yes     Comment: wakes several times    Exercise No    Pt has a pacemaker No    Pt has a defibrillator No    Reaction to local anesthetic No   Social History Narrative    The patient does not use an assistive device..      The patient does live in a home with stairs.       FAMILY HISTORY:  Family History   Problem Relation Age of Onset    Hypertension Father     Lipids Father         HYPERLIPIDEMIA    Diabetes Father     Psychiatric Mother     Cataracts Mother     Macular degeneration Neg     Glaucoma Neg     Retinal detachment Neg        MEDICATIONS:  Current Outpatient Medications   Medication Sig Dispense Refill    chlorhexidine gluconate 0.12 % Mouth/Throat Solution USE TWICE DAILY AFTER BRUSHING      venlafaxine ER 75 MG Oral Capsule SR 24 Hr Take 1 capsule (75 mg total) by mouth daily. 90 capsule 0    Fremanezumab-vfrm (AJOVY) 225 MG/1.5ML Subcutaneous Solution Prefilled Syringe Inject 1.5 mL into the skin every 30 (thirty) days. 1.5 mL 3    ubrogepant (UBRELVY) 100 MG Oral Tab Take one tablet at onset of migraine.  May take additional tablet in 2 hours if needed.  Do not exceed two tablets per 24 hour period. 10 tablet 0    COLLAGEN OR Take by mouth.      Multiple Vitamins-Minerals (MULTI-VITAMIN/MINERALS) Oral Tab Take 1 tablet by mouth daily.         ALLERGIES:    Allergies   Allergen Reactions    Sulfa Antibiotics RASH         Depression Screening (PHQ-2/PHQ-9): Over the LAST 2 WEEKS   Little interest or pleasure in doing things (over the last two weeks)?: Not at all    Feeling down, depressed, or hopeless (over the last two weeks)?: Not at all    PHQ-2  SCORE: 0           Review of Systems:  Constitutional:  Denies fatigue, night sweats, hot flashes  Eyes:  denies blurred or double vision  Cardiovascular:  denies chest pain or palpitations  Respiratory:  denies shortness of breath  Gastrointestinal:  denies heartburn, abdominal pain, diarrhea or constipation  Genitourinary:  denies dysuria, incontinence, abnormal vaginal discharge, vaginal itching  Musculoskeletal:  denies back pain.  Skin/Breast:  Denies any breast pain, lumps, or discharge.   Neurological:  denies headaches, extremity weakness or numbness.  Psychiatric: denies depression or anxiety.  Endocrine:   denies excessive thirst or urination.  Heme/Lymph:  easy bruising or bleeding.    PHYSICAL EXAM:   /81   Pulse 75   Wt 218 lb 9.6 oz (99.2 kg)   LMP 11/29/2023 (Approximate)   BMI 44.15 kg/m²   Wt Readings from Last 2 Encounters:   02/29/24 218 lb 9.6 oz (99.2 kg)   01/03/24 212 lb (96.2 kg)     Body mass index is 44.15 kg/m².    Constitutional: well developed, well nourished  Neck/Thyroid: thyroid symmetric, no nodules  Heart:  Regular rate and rhythm  Lungs:  Clear to asculation  Breast: normal without palpable masses, tenderness, asymmetry, nipple discharge, nipple retraction or skin changes  Abdomen:  soft, nontender, nondistended, no masses  Psychiatric:  Oriented to time, place, person and situation. Appropriate mood and affect    Pelvic Exam:  External Genitalia: normal appearance, hair distribution, and no lesions  Urethral Meatus:  normal in size, location, without lesions and prolapse  Bladder:  No fullness, masses or tenderness  Vagina:  Normal appearance without lesions, no abnormal discharge  Cervix:  Normal without tenderness on motion  Uterus: normal in size, contour, position, mobility, without tenderness  Adnexa: normal without masses or tenderness  Perineum/anus: normal      Assessment & Plan:    Alix Ordonez is a 48 year old female who presents for an annual physical  exam.    1. Encounter for gynecological examination  Pap and HPV.   Will do Pap/HPV q 3 years.   Annual exams encouraged.  Order for mammogram.   Refill Effexor ER 75 mg.   Consider mini pill to help control hot flashes.   RTC 1 year or prn     2. Encounter for screening mammogram for breast cancer  - [facility] JOHNNIE 2D+3D SCREENING BILAT (CPT=77067/44044); Future          Requested Prescriptions      No prescriptions requested or ordered in this encounter         Agnieszka Hensley MD  2/29/2024  5:45 PM

## 2024-03-01 LAB — HPV I/H RISK 1 DNA SPEC QL NAA+PROBE: NEGATIVE

## 2024-03-24 DIAGNOSIS — Z76.0 MEDICATION REFILL: Primary | ICD-10-CM

## 2024-03-25 ENCOUNTER — APPOINTMENT (OUTPATIENT)
Dept: GENERAL RADIOLOGY | Facility: HOSPITAL | Age: 49
End: 2024-03-25
Attending: EMERGENCY MEDICINE
Payer: MEDICAID

## 2024-03-25 ENCOUNTER — APPOINTMENT (OUTPATIENT)
Dept: ULTRASOUND IMAGING | Facility: HOSPITAL | Age: 49
End: 2024-03-25
Attending: EMERGENCY MEDICINE
Payer: MEDICAID

## 2024-03-25 ENCOUNTER — HOSPITAL ENCOUNTER (EMERGENCY)
Facility: HOSPITAL | Age: 49
Discharge: HOME | End: 2024-03-26
Attending: EMERGENCY MEDICINE
Payer: MEDICAID

## 2024-03-25 ENCOUNTER — APPOINTMENT (OUTPATIENT)
Dept: CT IMAGING | Facility: HOSPITAL | Age: 49
End: 2024-03-25
Attending: EMERGENCY MEDICINE
Payer: MEDICAID

## 2024-03-25 ENCOUNTER — NURSE TRIAGE (OUTPATIENT)
Dept: INTERNAL MEDICINE CLINIC | Facility: CLINIC | Age: 49
End: 2024-03-25

## 2024-03-25 DIAGNOSIS — R07.89 CHEST PAIN, ATYPICAL: Primary | ICD-10-CM

## 2024-03-25 LAB
ALBUMIN SERPL-MCNC: 4.2 G/DL (ref 3.2–4.8)
ALBUMIN/GLOB SERPL: 1.2 {RATIO} (ref 1–2)
ALP LIVER SERPL-CCNC: 91 U/L
ALT SERPL-CCNC: 28 U/L
ANION GAP SERPL CALC-SCNC: 4 MMOL/L (ref 0–18)
AST SERPL-CCNC: 28 U/L (ref ?–34)
BASOPHILS # BLD AUTO: 0.04 X10(3) UL (ref 0–0.2)
BASOPHILS NFR BLD AUTO: 0.7 %
BILIRUB SERPL-MCNC: 0.5 MG/DL (ref 0.3–1.2)
BUN BLD-MCNC: 19 MG/DL (ref 9–23)
BUN/CREAT SERPL: 26 (ref 10–20)
CALCIUM BLD-MCNC: 9.1 MG/DL (ref 8.7–10.4)
CHLORIDE SERPL-SCNC: 105 MMOL/L (ref 98–112)
CO2 SERPL-SCNC: 31 MMOL/L (ref 21–32)
CREAT BLD-MCNC: 0.73 MG/DL
D DIMER PPP FEU-MCNC: 0.53 UG/ML FEU (ref ?–0.5)
DEPRECATED RDW RBC AUTO: 39.2 FL (ref 35.1–46.3)
EGFRCR SERPLBLD CKD-EPI 2021: 101 ML/MIN/1.73M2 (ref 60–?)
EOSINOPHIL # BLD AUTO: 0.11 X10(3) UL (ref 0–0.7)
EOSINOPHIL NFR BLD AUTO: 1.8 %
ERYTHROCYTE [DISTWIDTH] IN BLOOD BY AUTOMATED COUNT: 12.2 % (ref 11–15)
GLOBULIN PLAS-MCNC: 3.4 G/DL (ref 2.8–4.4)
GLUCOSE BLD-MCNC: 83 MG/DL (ref 70–99)
HCT VFR BLD AUTO: 41.4 %
HGB BLD-MCNC: 13.8 G/DL
IMM GRANULOCYTES # BLD AUTO: 0.01 X10(3) UL (ref 0–1)
IMM GRANULOCYTES NFR BLD: 0.2 %
LYMPHOCYTES # BLD AUTO: 2.46 X10(3) UL (ref 1–4)
LYMPHOCYTES NFR BLD AUTO: 41 %
MAGNESIUM SERPL-MCNC: 2.1 MG/DL (ref 1.6–2.6)
MCH RBC QN AUTO: 29.4 PG (ref 26–34)
MCHC RBC AUTO-ENTMCNC: 33.3 G/DL (ref 31–37)
MCV RBC AUTO: 88.1 FL
MONOCYTES # BLD AUTO: 0.36 X10(3) UL (ref 0.1–1)
MONOCYTES NFR BLD AUTO: 6 %
NEUTROPHILS # BLD AUTO: 3.02 X10 (3) UL (ref 1.5–7.7)
NEUTROPHILS # BLD AUTO: 3.02 X10(3) UL (ref 1.5–7.7)
NEUTROPHILS NFR BLD AUTO: 50.3 %
OSMOLALITY SERPL CALC.SUM OF ELEC: 291 MOSM/KG (ref 275–295)
PLATELET # BLD AUTO: 200 10(3)UL (ref 150–450)
POTASSIUM SERPL-SCNC: 4 MMOL/L (ref 3.5–5.1)
PROT SERPL-MCNC: 7.6 G/DL (ref 5.7–8.2)
RBC # BLD AUTO: 4.7 X10(6)UL
SODIUM SERPL-SCNC: 140 MMOL/L (ref 136–145)
TROPONIN I SERPL HS-MCNC: <3 NG/L
TSI SER-ACNC: 2.6 MIU/ML (ref 0.55–4.78)
WBC # BLD AUTO: 6 X10(3) UL (ref 4–11)

## 2024-03-25 PROCEDURE — 99285 EMERGENCY DEPT VISIT HI MDM: CPT

## 2024-03-25 PROCEDURE — 85025 COMPLETE CBC W/AUTO DIFF WBC: CPT | Performed by: EMERGENCY MEDICINE

## 2024-03-25 PROCEDURE — 96360 HYDRATION IV INFUSION INIT: CPT

## 2024-03-25 PROCEDURE — 80053 COMPREHEN METABOLIC PANEL: CPT | Performed by: EMERGENCY MEDICINE

## 2024-03-25 PROCEDURE — 84484 ASSAY OF TROPONIN QUANT: CPT | Performed by: EMERGENCY MEDICINE

## 2024-03-25 PROCEDURE — 93005 ELECTROCARDIOGRAM TRACING: CPT

## 2024-03-25 PROCEDURE — 71260 CT THORAX DX C+: CPT | Performed by: EMERGENCY MEDICINE

## 2024-03-25 PROCEDURE — 93971 EXTREMITY STUDY: CPT | Performed by: EMERGENCY MEDICINE

## 2024-03-25 PROCEDURE — 84443 ASSAY THYROID STIM HORMONE: CPT | Performed by: EMERGENCY MEDICINE

## 2024-03-25 PROCEDURE — 93010 ELECTROCARDIOGRAM REPORT: CPT

## 2024-03-25 PROCEDURE — 83735 ASSAY OF MAGNESIUM: CPT | Performed by: EMERGENCY MEDICINE

## 2024-03-25 PROCEDURE — 85379 FIBRIN DEGRADATION QUANT: CPT | Performed by: EMERGENCY MEDICINE

## 2024-03-25 PROCEDURE — 96361 HYDRATE IV INFUSION ADD-ON: CPT

## 2024-03-25 PROCEDURE — 99283 EMERGENCY DEPT VISIT LOW MDM: CPT

## 2024-03-25 PROCEDURE — 71045 X-RAY EXAM CHEST 1 VIEW: CPT | Performed by: EMERGENCY MEDICINE

## 2024-03-25 RX ORDER — VENLAFAXINE HYDROCHLORIDE 75 MG/1
75 CAPSULE, EXTENDED RELEASE ORAL DAILY
Qty: 90 CAPSULE | Refills: 3 | Status: SHIPPED | OUTPATIENT
Start: 2024-03-25

## 2024-03-25 NOTE — TELEPHONE ENCOUNTER
Pt booked appt via [employer]        Message    Appointment For: Alix Ordonez ([id number])   Visit Type: MYCHART EXAM (2964)      4/24/2024   11:20 AM  20 mins.  [facility]      Patient Comments:   Numbing of hands during sleep, dizziness, overheating, and   shaking hands, aches above left breast.

## 2024-03-25 NOTE — TELEPHONE ENCOUNTER
Action Requested: Summary for Provider     []  Critical Lab, Recommendations Needed  [] Need Additional Advice  [x]   FYI    []   Need Orders  [] Need Medications Sent to Pharmacy  []  Other     SUMMARY: Per protocol disposition advised ER/ICC, Patient states she will go to [facility]      Reason for call: Chest Pain Angina  Onset: 1     Per patient left chest pain intermittent for the last 2 hours, 10 minutes at a time. Symptom currently present. 1-2 times a week for the past few weeks. Rated as moderate pain. Does not radiate. Denies  shortness of breath. Denies other symptoms marked no under protocol.     Reason for Disposition   Chest pain lasting longer than 5 minutes and occurred in last 3 days (72 hours) (Exception: feels exactly the same as previously diagnosed heartburn and has accompanying sour taste in mouth)    Protocols used: Chest Pain-A-[place]

## 2024-03-25 NOTE — TELEPHONE ENCOUNTER
2/29/2024 annual with PARRISH    1. Encounter for gynecological examination  Pap and HPV.   Will do Pap/HPV q 3 years.   Annual exams encouraged.  Order for mammogram.   Refill Effexor ER 75 mg.   Consider mini pill to help control hot flashes.   RTC 1 year or prn     Effexor ER rx sent for one year.

## 2024-03-25 NOTE — ED INITIAL ASSESSMENT (HPI)
Patient presents to ED with on and off \"aches\" in her chest and numbness to her hands. Per patient this happens more while she is sleeping. Patient is also experiencing hot flashes and becomes dizzy

## 2024-03-26 VITALS
RESPIRATION RATE: 17 BRPM | DIASTOLIC BLOOD PRESSURE: 73 MMHG | TEMPERATURE: 98 F | OXYGEN SATURATION: 99 % | HEART RATE: 63 BPM | SYSTOLIC BLOOD PRESSURE: 119 MMHG

## 2024-03-26 LAB
ATRIAL RATE: 76 BPM
P AXIS: 8 DEGREES
P-R INTERVAL: 138 MS
Q-T INTERVAL: 378 MS
QRS DURATION: 78 MS
QTC CALCULATION (BEZET): 425 MS
R AXIS: 34 DEGREES
T AXIS: 38 DEGREES
VENTRICULAR RATE: 76 BPM

## 2024-03-26 NOTE — ED QUICK NOTES
Patient provided with discharge instructions. Verbalized understanding for plan of care at home and follow up. All question and concerns addressed prior to discharge. Iv removed, catheter intact.

## 2024-03-26 NOTE — ED PROVIDER NOTES
Patient Seen in: [facility] Emergency Department    History     Chief Complaint   Patient presents with    Chest Pain Angina     Stated Complaint: chest pain     HPI    48-year-old female with past medical history of anxiety presenting for evaluation with complaints of throbbing chest discomfort as noted since last evening associated with and tingling preceded by sensation of feeling flushed with hot flashes.  No shortness of breath.  No exertional pleuritic complaints.  Patient with chronic left leg swelling without recent travel/surgeries or immobilization without pleurisy or hemoptysis.  No exogenous estrogen use.  No personal or family history of CAD/VTE.  No fevers or chills, no cough.    Past Medical History:   Diagnosis Date    Anxiety     Chicken pox     Depression     Extrinsic asthma, unspecified     H/O seasonal allergies     Hyperopia with presbyopia of both eyes 2021    Migraines     Morbid obesity with BMI of 50.0-59.9, adult (HCC)     ANJELICA on CPAP     Ovarian cyst     Sleep apnea     Varicose vein        Past Surgical History:   Procedure Laterality Date      2013    CYST ASPIRATION RIGHT      LAPAROSCOPY, GASTRIC RESTRICTIVE PROCEDURE, WITH GASTRIC BYPASS FOR MORBID  2022    MYOMECTOMY 5/> INTRAMURAL MYOMAS &/OR TOTAL WT >250 GMS, ABDOMINAL APPROACH      SKIN SURGERY  2017    R breast sebaceous cyst            Family History   Problem Relation Age of Onset    Hypertension Father     Lipids Father         HYPERLIPIDEMIA    Diabetes Father     Psychiatric Mother     Cataracts Mother     Macular degeneration Neg     Glaucoma Neg     Retinal detachment Neg        Social History     Socioeconomic History    Marital status:     Number of children: 1   Occupational History    Occupation:     Occupation: [employer]   Tobacco Use    Smoking status: Former     Packs/day: 0.00     Years: 20.00     Additional pack years: 0.00     Total pack  years: 0.00     Types: Cigarettes     Quit date: 2013     Years since quittin.2     Passive exposure: Past    Smokeless tobacco: Never   Vaping Use    Vaping Use: Never used   Substance and Sexual Activity    Alcohol use: Not Currently     Alcohol/week: 0.0 standard drinks of alcohol     Comment: Not frequently    Drug use: No    Sexual activity: Not Currently     Partners: Male   Other Topics Concern    Caffeine Concern Yes     Comment: 1-2 diet soda daily, 1 cup of coffee daily    Sleep Concern Yes     Comment: wakes several times    Exercise No    Pt has a pacemaker No    Pt has a defibrillator No    Reaction to local anesthetic No   Social History Narrative    The patient does not use an assistive device..      The patient does live in a home with stairs.       Review of Systems :  Constitutional: As per HPI  Respiratory: Negative for cough and shortness of breath.    Cardiovascular: (+) chest pain.    Positive for stated complaint: chest pain  Other systems are as noted in HPI.  Constitutional and vital signs reviewed.      All other systems reviewed and negative except as noted above.    PSFH elements reviewed from today and agreed except as otherwise stated in HPI.    Physical Exam     ED Triage Vitals [24 1831]   /82   Pulse 74   Resp 20   Temp 97.9 °F (36.6 °C)   Temp src Temporal   SpO2 99 %   O2 Device None (Room air)       Current:/82   Pulse 74   Temp 97.9 °F (36.6 °C) (Temporal)   Resp 20   LMP 2023 (Approximate)   SpO2 99%         Physical Exam   Constitutional: No distress.   HEENT: MMM.  Head: Normocephalic.   Eyes: No injection.   Cardiovascular: RRR. LLE with 2+ DP/PT pulses.  Pulmonary/Chest: Effort normal. CTAB.  Abdominal: Soft. Nontender.  Musculoskeletal: No gross deformity. Mild left foot edema without cutaneous/crepitant change.  Neurological: Alert. BLE with 5/5 strength proximally and distally.  Skin: Skin is warm.   Psychiatric: Cooperative.  Nursing  note and vitals reviewed.        ED Course     Labs Reviewed   COMP METABOLIC PANEL (14) - Abnormal; Notable for the following components:       Result Value    BUN/CREA Ratio 26.0 (*)     All other components within normal limits   D-DIMER - Abnormal; Notable for the following components:    D-Dimer 0.53 (*)     All other components within normal limits   TROPONIN I HIGH SENSITIVITY - Normal   MAGNESIUM - Normal   TSH W REFLEX TO FREE T4 - Normal   CBC WITH DIFFERENTIAL WITH PLATELET    Narrative:     The following orders were created for panel order CBC With Differential With Platelet.  Procedure                               Abnormality         Status                     ---------                               -----------         ------                     CBC W/ DIFFERENTIAL[[id number]]                              Final result                 Please view results for these tests on the individual orders.   CBC W/ DIFFERENTIAL     EKG    Rate, intervals and axes as noted on EKG Report.  Rate: 76  Rhythm: Sinus Rhythm  Reading: NSR 76 without ST elevation as independently interpreted myself         Preliminary Radiology Report  [facility] Radiology, [facility]  [phone] - Phone    [facility]    NAME: LISSETH ALVAREZ    DATE OF EXAM: 03/25/2024  Patient No:  [id number]  Physician:  MARIA EUGENIA^TANJA^2  YOB: 1975    Past Medical History (entered by Technologist):    Reason For Exam (entered by Technologist):  Chest pain  Other Notes (entered by Technologist): ER Pod 4 Rm 41    Additional Information (per [facility]):      CTA chest      IMPRESSION:    Comparison: 6/19/2021    No pulmonary embolism.  Normal pulmonary trunk.  Normal thoracic aorta.  Heart size normal.  No pericardial effusion.  No mediastinal lymphadenopathy.  No consolidation or pleural effusion.  Subsegmental atelectasis with patchy areas of air trapping, a nonspecific finding.  Discogenic endplate changes in the thoracic  spine.    Report sent at 12:02 AM ET    Jose Cardoza MD  This report has been electronically signed and verified by the Radiologist whose name is printed above.    DD:  03/25/2024/DT:  03/26/2024    Preliminary Radiology Report  [facility], [facility]  [phone] - Phone    [facility]    NAME: LISSETH ALVAREZ    DATE OF EXAM: 03/25/2024  Patient No:  [id number]  Physician:  NIKOS ^Ivania  YOB: 1975    Past Medical History (entered by Technologist):  prev 2014  Reason For Exam (entered by Technologist):  left foot swelling  Other Notes (entered by Technologist): neg for dvt LLE   only one of paired pnv seen    Additional Information (per [facility]):      US left lower extremity venous Doppler      IMPRESSION:    No evidence of deep venous thrombosis in the left lower extremity.    Report sent at 12:03 AM ET    Jose Cardoza MD  This report has been electronically signed and verified by the Radiologist whose name is printed above.    DD:  03/25/2024/DT:  03/26/2024    Heart Score:    HEART Score      Title      Criteria Score   Age: 45-64 Age Score: 1   History: Slightly Suspicious Hx Score: 0     EKG: Normal EKG Score: 0   HTN: No   Hypercholesterolemia: No   Atherosclerosis/PVD: No     DM: No   BMI>30kg/m2: Yes   Smoking: No   Family History: No         Other Risk Factor Score: 1             Lab Results   Component Value Date    TROP <0.045 06/19/2021    TROPHS <3 03/25/2024           HEART Score: 2        Risk of adverse cardiac event is 0.9-1.7%            ED Course as of 03/26/24 2340  ------------------------------------------------------------  Time: 03/25 2311  Comment: Resting comfortably, ED course nonacute and without dysrhythmia.     MDM   DIFFERENTIAL DIAGNOSIS: After history and physical exam differential diagnosis includes but is not limited to ACS, myocarditis, anemia, electrolyte derangement.    Pulse ox: 99%:Normal on RA, as independently  interpreted by myself    Cardiac Monitor Interpretation:   Pulse Readings from Last 1 Encounters:   03/25/24 74   , sinus,      Medical Decision Making  Evaluation for atypical chest pain in low risk HEART/Wells patient with chronic left foot swelling without neurovascular compromise -dimer minimally elevated for which CT/US obtained and nonacute; ED course unremarkable and without dysrhythmia with grossly nonacute labs aside from minimal dehydration by BUN/Cr; stable for discharge with ongoing outpatient followup.    Problems Addressed:  Chest pain, atypical: acute illness or injury    Amount and/or Complexity of Data Reviewed  Labs: ordered. Decision-making details documented in ED Course.  Radiology: ordered and independent interpretation performed. Decision-making details documented in ED Course.     Details: CXR without obvious pneumothorax as independently interpreted by myself  ECG/medicine tests: ordered and independent interpretation performed. Decision-making details documented in ED Course.        I was wearing at minimum a facemask and eye protection throughout this encounter with handwashing performed prior and after patient evaluation without personal hand/facial/oropharyngeal contact and gloves worn throughout encounter. See note and/or contact this provider for further PPE details.    Disposition and Plan     Clinical Impression:  1. Chest pain, atypical        Disposition:  Discharge    Follow-up:  Masood Yu MD  [address] [place] [address] [id number] [phone]    Call  For followup and re-evaluation.      Medications Prescribed:  Discharge Medication List as of 3/26/2024  1:39 AM

## 2024-04-29 ENCOUNTER — TELEPHONE (OUTPATIENT)
Dept: GASTROENTEROLOGY | Facility: CLINIC | Age: 49
End: 2024-04-29

## 2024-04-29 NOTE — TELEPHONE ENCOUNTER
Pt.needs to cancel do to a personal reason and will call office to r/s. Office number has been provided.

## 2024-05-21 NOTE — PROGRESS NOTES
Chantale Jarrett is a 40year old female. HPI:     CC:  Patient presents with:  Alopecia: LOV 5/17/2017. pt presenting today with Alopecia f/u. c/o dryness and itching. pt previously used Ketoconazole and Mometasone with improvement, Requesting refills. Diagnosis (Required): Atopic Dermatitis/Eczematous Dermatitis shampoo as directed 3 times weekly 120 mL 12   • Rizatriptan Benzoate 10 MG Oral Tab TAKE 1-2 TABLETS AT ONSET OF MIGRAINE, MAY REPEAT IN 1 HOUR : NO MORE THAN 4 TABLET IN 24 HOURS 27 tablet 0   • topiramate 50 MG Oral Tab 1 tablet nightly x 1 week, then 2 Is Mycophenalate Contraindicated?: No resource strain: Not on file      Food insecurity:        Worry: Not on file        Inability: Not on file      Transportation needs:        Medical: Not on file        Non-medical: Not on file    Tobacco Use      Smoking status: Former Smoker        Years Dupixent Dosing Override: 300mg SC every 4 weeks No        Breast feeding: Not Asked        Reaction to local anesthetic: No    Social History Narrative      The patient does not use an assistive device. .        The patient does live in a home with stairs.     Family History   Problem Relation Age of Onse Pregnancy And Lactation Warning Text: There have not been adverse fetal risks in women taking Dupixent while pregnant. It is unknown if this medication is excreted in breast milk. macules and papules 6 mm and less scattered on exam. pigmented lesions examined with dermoscopy benign-appearing patterns. Waxy tannish keratotic papules scattered, cherry-red vascular papules scattered. See map today's date for lesions noted .   See Is Phototherapy Contraindicated?: Yes atypical    Diagnosis/Clinical History: changing nevus  Spec 1 Description >>>>>: right chin  Spec 1 Comment: r/o atypical. lesion increased in size with loss of pigment in lesion noted.   Shave/ tangential biopsy performed, operative note and consent in ch Dupixent Monitoring Guidelines: There is no laboratory monitoring requirement with Dupixent. electrocautery/aluminum chloride. Estimated blood loss less than 2 cc. Biopsy dressed with Polysporin, bandage.     Pressure dressing:   No    Complications: None    Written instructions given and reviewed with patient    Await pathology    Contact info Dupixent Dosing: Use Override Dosage Detail Level: Zone Comments: Patients symptoms are causing loss of sleep and are adversely affecting their quality of life

## 2024-06-23 ENCOUNTER — HOSPITAL ENCOUNTER (OUTPATIENT)
Age: 49
Discharge: HOME | End: 2024-06-23

## 2024-06-23 VITALS
OXYGEN SATURATION: 100 % | DIASTOLIC BLOOD PRESSURE: 76 MMHG | TEMPERATURE: 97 F | HEART RATE: 65 BPM | RESPIRATION RATE: 18 BRPM | SYSTOLIC BLOOD PRESSURE: 142 MMHG

## 2024-06-23 DIAGNOSIS — R21 SKIN ERUPTION: Primary | ICD-10-CM

## 2024-06-23 PROCEDURE — 99214 OFFICE O/P EST MOD 30 MIN: CPT

## 2024-06-23 PROCEDURE — 99213 OFFICE O/P EST LOW 20 MIN: CPT

## 2024-06-23 RX ORDER — TRIAMCINOLONE ACETONIDE 5 MG/G
1 CREAM TOPICAL 3 TIMES DAILY
Qty: 15 G | Refills: 1 | Status: SHIPPED | OUTPATIENT
Start: 2024-06-23 | End: 2024-07-07

## 2024-06-23 RX ORDER — HYDROXYZINE HYDROCHLORIDE 25 MG/1
TABLET, FILM COATED ORAL EVERY 8 HOURS PRN
Qty: 30 TABLET | Refills: 0 | Status: SHIPPED | OUTPATIENT
Start: 2024-06-23 | End: 2024-07-03

## 2024-06-23 NOTE — ED INITIAL ASSESSMENT (HPI)
Patient arrives ambulatory with c/o red blotchy, itchy, burning rash to face x today. Reports recently struggling with dry patches to eyelids. Worried that she is having an allergic reaction. Denies rash elsewhere to body.

## 2024-06-23 NOTE — DISCHARGE INSTRUCTIONS
As discussed, uncertain cause of skin eruption. Topical steroids prescribed. Apply 2-3X a day for at least 2 weeks. Avoid sun exposure. Avoid face make up, facial cleansers, toner.  I have prescribed an anti-itch medication that you may take as needed.  It may cause drowsiness.  Recommend that you follow-up with your dermatologist.    If you have any worsening facial rash, lip swelling, tongue swelling, difficulty swallowing her own secretions, please go to ER.  If no improvement of rash, after 7 to 10 days of topical steroids, please follow-up with your primary care doctor or return to immediate care for reevaluation.

## 2024-06-25 NOTE — ED PROVIDER NOTES
Patient Seen in: [facility]      History     Chief Complaint   Patient presents with    Rash     Stated Complaint: rash    Subjective: This is a 48-year-old female, presents to immediate care for evaluation of facial rash for the past few days.  She notes she has been dealing with \"dry patches to my eyelids\" for the past week but woke up today with \"red blotchy skin\".  She states the rash is \"burning\".  Positive pruritus.  She denies any new soaps, lotions, facial make-up, facial cleansers, detergents etc.  No angioedema.  No difficulty swallowing her own secretions.  Airway patent.  Speaking complete full sentences without difficulty.  Rash is localized to face and neck only.  Well-appearing.  AOx4.  The history is provided by the patient.           Objective:   Past Medical History:    Anxiety    Chicken pox    Depression    Extrinsic asthma, unspecified    H/O seasonal allergies    Hyperopia with presbyopia of both eyes    Migraines    Morbid obesity with BMI of 50.0-59.9, adult (HCC)    ANJELICA on CPAP    Ovarian cyst    Sleep apnea    Varicose vein              Past Surgical History:   Procedure Laterality Date      2013    Cyst aspiration right      Laparoscopy, gastric restrictive procedure, with gastric bypass for morbid  2022    Myomectomy 5/> intramural myomas &/or total wt >250 gms, abdominal approach      Skin surgery  2017    R breast sebaceous cyst                Social History     Socioeconomic History    Marital status:     Number of children: 1   Occupational History    Occupation:     Occupation: [employer]   Tobacco Use    Smoking status: Former     Current packs/day: 0.00     Types: Cigarettes     Quit date: 1993     Years since quittin.5     Passive exposure: Past    Smokeless tobacco: Never   Vaping Use    Vaping status: Never Used   Substance and Sexual Activity    Alcohol use: Not Currently     Alcohol/week: 0.0 standard  drinks of alcohol     Comment: Not frequently    Drug use: No    Sexual activity: Not Currently     Partners: Male   Other Topics Concern    Caffeine Concern Yes     Comment: 1-2 diet soda daily, 1 cup of coffee daily    Sleep Concern Yes     Comment: wakes several times    Exercise No    Pt has a pacemaker No    Pt has a defibrillator No    Reaction to local anesthetic No   Social History Narrative    The patient does not use an assistive device..      The patient does live in a home with stairs.              Review of Systems   Constitutional: Negative.    HENT: Negative.     Eyes: Negative.    Respiratory: Negative.  Negative for cough, chest tightness, shortness of breath, wheezing and stridor.    Cardiovascular: Negative.  Negative for chest pain, palpitations and leg swelling.   Gastrointestinal: Negative.  Negative for diarrhea, nausea and vomiting.   Musculoskeletal: Negative.    Skin:  Positive for rash.   Neurological: Negative.        Positive for stated complaint: rash  Other systems are as noted in HPI.  Constitutional and vital signs reviewed.      All other systems reviewed and negative except as noted above.    Physical Exam     ED Triage Vitals [06/23/24 1209]   /76   Pulse 65   Resp 18   Temp 97 °F (36.1 °C)   Temp src Temporal   SpO2 100 %   O2 Device None (Room air)       Current Vitals:   No data recorded        Physical Exam  Constitutional:       General: She is not in acute distress.     Appearance: Normal appearance. She is not ill-appearing.   HENT:      Head: Normocephalic.      Right Ear: Tympanic membrane, ear canal and external ear normal.      Left Ear: Tympanic membrane, ear canal and external ear normal.      Nose: Nose normal.      Mouth/Throat:      Lips: Pink.      Mouth: Mucous membranes are moist. No oral lesions or angioedema.      Tongue: No lesions.      Palate: No lesions.      Pharynx: Oropharynx is clear. Uvula midline. No pharyngeal swelling, oropharyngeal exudate,  posterior oropharyngeal erythema or uvula swelling.   Eyes:      Extraocular Movements: Extraocular movements intact.      Pupils: Pupils are equal, round, and reactive to light.   Cardiovascular:      Rate and Rhythm: Normal rate.      Pulses: Normal pulses.      Heart sounds: Normal heart sounds.   Pulmonary:      Effort: Pulmonary effort is normal. No respiratory distress.      Breath sounds: Normal breath sounds. No stridor. No wheezing, rhonchi or rales.   Chest:      Chest wall: No tenderness.   Musculoskeletal:         General: Normal range of motion.      Cervical back: Normal range of motion and neck supple. No rigidity or tenderness.   Lymphadenopathy:      Cervical: No cervical adenopathy.   Skin:     Capillary Refill: Capillary refill takes less than 2 seconds.      Findings: Rash present. Rash is macular and papular.          Neurological:      General: No focal deficit present.      Mental Status: She is alert and oriented to person, place, and time.               ED Course   Labs Reviewed - No data to display                   MDM      Differentials considered include: Contact dermatitis, allergic reaction, photosensitivity.    There is no evidence of necrotizing fasciitis or Chun-Gordon syndrome.  No blistering or peeling of the skin.  Mucous membranes are not involved.  No prodromal URI symptoms.    Rash does appear to be mixed eczema flare to eyelids and possible allergic reaction, positive macular papular patches to face.  Positive itchiness.    Patient is aware to avoid harsh chemicals, make-up, facial cleansers and toners.  She is aware of topical corticosteroids prescribed, she is aware to use carefully around nasal passages, eyelids, mouth.  Hydroxyzine prescribed to help alleviate itch.    Patient is aware if rash worsens, involves mucous membranes, tongue, lip swelling to go to ER immediately.  She verbalized understand agrees plan of care.                                   Medical  Decision Making      Disposition and Plan     Clinical Impression:  1. Skin eruption         Disposition:  Discharge  6/23/2024 12:53 pm    Follow-up:  Masood Yu MD  [address] [place], [place] [id number] [phone]      As needed          Medications Prescribed:  Discharge Medication List as of 6/23/2024 12:53 PM        START taking these medications    Details   triamcinolone 0.5 % External Cream Apply 1 Application topically 3 (three) times daily for 14 days., Normal, Disp-15 g, R-1      hydrOXYzine 25 MG Oral Tab Take 1-2 tablets (25-50 mg total) by mouth every 8 (eight) hours as needed for Itching., Normal, Disp-30 tablet, R-0

## 2024-06-27 DIAGNOSIS — G43.011 INTRACTABLE MIGRAINE WITHOUT AURA AND WITH STATUS MIGRAINOSUS: ICD-10-CM

## 2024-06-28 NOTE — TELEPHONE ENCOUNTER
Requested Prescriptions     Pending Prescriptions Disp Refills    AJOVY 225 MG/1.5ML Subcutaneous Solution Prefilled Syringe [Pharmacy Med Name: AJOVY 225MG/1.5ML PF SYR 1.5ML] 1.5 mL 3     Sig: INJECT 1.5 ML INTO THE SKIN EVERY 30 DAYS      LOV: 11/1/23  Return in about 3 months (around 2/1/2024).  NOV: none    Last refill/[place]: 11/1/23

## 2024-07-01 RX ORDER — FREMANEZUMAB-VFRM 225 MG/1.5ML
INJECTION SUBCUTANEOUS
Qty: 1.5 ML | Refills: 0 | Status: SHIPPED | OUTPATIENT
Start: 2024-07-01

## 2024-08-07 ENCOUNTER — NURSE TRIAGE (OUTPATIENT)
Dept: INTERNAL MEDICINE CLINIC | Facility: CLINIC | Age: 49
End: 2024-08-07

## 2024-08-07 NOTE — TELEPHONE ENCOUNTER
Please reply to pool: EM RN TRIAGE  Action Requested: Summary for Provider     []  Critical Lab, Recommendations Needed  [x] Need Additional Advice  []   GALLO    []   Need Orders  [] Need Medications Sent to Pharmacy  []  Other     SUMMARY: Patient contacts clinic with a multitude of symptoms.  Ear pain, upper arm pain, generalized muscle aches, dizziness and numbness in both hands.  She also has patches of dry, red skin on her face.  Has been worked up in the emergency room previously for similar symptoms.  Spent 9 hours there and no diagnosis was found.  Denies chest pain or shortness of breath.  Numbness of hands is worse at night.  History of fibromyalgia and believes ear pain and muscle aches are related.  Has periods where she feels sweaty.  Has not checked temperature.  Denies chest pain or shortness of breath, facial drooping or weakness or unilateral weakness.  Denies fever or chills. Appointment moved from 08/14 to this Friday 08/09.   Patient advised to return to emergency room for any worsening of symptoms.  She verbalized understanding and compliance. Dr. Gigi HOLDER.    Reason for call: Ear Pain, Numbness, and Body ache and/or chills  Onset: Data Unavailable                       Reason for Disposition   Numbness or tingling in one or both hands is a chronic symptom (recurrent or ongoing problem lasting > 4 weeks)   All other earaches  (Exceptions: Earache lasting < 1 hour, and earache from air travel.)    Protocols used: Neurologic Deficit-A-OH, Earache-A-OH

## 2024-08-09 ENCOUNTER — OFFICE VISIT (OUTPATIENT)
Dept: INTERNAL MEDICINE CLINIC | Facility: CLINIC | Age: 49
End: 2024-08-09
Payer: MEDICAID

## 2024-08-09 VITALS
DIASTOLIC BLOOD PRESSURE: 84 MMHG | BODY MASS INDEX: 45 KG/M2 | WEIGHT: 222 LBS | HEART RATE: 69 BPM | SYSTOLIC BLOOD PRESSURE: 133 MMHG | OXYGEN SATURATION: 97 %

## 2024-08-09 DIAGNOSIS — G89.29 CHRONIC PAIN OF BOTH SHOULDERS: ICD-10-CM

## 2024-08-09 DIAGNOSIS — E66.01 CLASS 3 SEVERE OBESITY DUE TO EXCESS CALORIES WITHOUT SERIOUS COMORBIDITY WITH BODY MASS INDEX (BMI) OF 40.0 TO 44.9 IN ADULT (HCC): ICD-10-CM

## 2024-08-09 DIAGNOSIS — M79.7 FIBROMYALGIA: ICD-10-CM

## 2024-08-09 DIAGNOSIS — M25.511 CHRONIC PAIN OF BOTH SHOULDERS: ICD-10-CM

## 2024-08-09 DIAGNOSIS — L30.9 DERMATITIS: ICD-10-CM

## 2024-08-09 DIAGNOSIS — M77.8 TENDINITIS OF SHOULDER, UNSPECIFIED LATERALITY: ICD-10-CM

## 2024-08-09 DIAGNOSIS — G56.03 BILATERAL CARPAL TUNNEL SYNDROME: Primary | ICD-10-CM

## 2024-08-09 DIAGNOSIS — M25.512 CHRONIC PAIN OF BOTH SHOULDERS: ICD-10-CM

## 2024-08-09 PROCEDURE — 99214 OFFICE O/P EST MOD 30 MIN: CPT | Performed by: INTERNAL MEDICINE

## 2024-08-09 NOTE — PROGRESS NOTES
HPI:    Patient ID: Alix Ordonez is a 48 year old female.    Tingling  Associated symptoms include arthralgias, myalgias, numbness (hand tingling wakes her up at night) and a rash (face  dry). Pertinent negatives include no abdominal pain or chest pain.   Ear Pain   Associated symptoms include a rash (face  dry). Pertinent negatives include no abdominal pain.       Er follow up    8/7/24  Patient contacts clinic with a multitude of symptoms. Ear pain, upper arm pain, generalized muscle aches, dizziness and numbness in both hands. She also has patches of dry, red skin on her face. Has been worked up in the emergency room previously for similar symptoms. Spent 9 hours there and no diagnosis was found. Denies chest pain or shortness of breath. Numbness of hands is worse at night. History of fibromyalgia and believes ear pain and muscle aches are related. Has periods where she feels sweaty. Has not checked temperature. Denies chest pain or shortness of breath, facial drooping or weakness or unilateral weakness. Denies fever or chills. Appointment moved from 08/14 to this Friday 08/09. Patient advised to return to emergency room for any worsening of symptoms. She verbalized understanding and compliance. Dr. Gigi HOLDER.   Component      Latest Ref Rng 3/25/2024   WBC      4.0 - 11.0 x10(3) uL 6.0    RBC      3.80 - 5.30 x10(6)uL 4.70    Hemoglobin      12.0 - 16.0 g/dL 13.8    Hematocrit      35.0 - 48.0 % 41.4    MCV      80.0 - 100.0 fL 88.1    MCH      26.0 - 34.0 pg 29.4    MCHC      31.0 - 37.0 g/dL 33.3    RDW-SD      35.1 - 46.3 fL 39.2    RDW      11.0 - 15.0 % 12.2    Platelet Count      150.0 - 450.0 10(3)uL 200.0    Prelim Neutrophil Abs      1.50 - 7.70 x10 (3) uL 3.02    Neutrophils Absolute      1.50 - 7.70 x10(3) uL 3.02    Lymphocytes Absolute      1.00 - 4.00 x10(3) uL 2.46    Monocytes Absolute      0.10 - 1.00 x10(3) uL 0.36    Eosinophils Absolute      0.00 - 0.70 x10(3) uL 0.11    Basophils  Absolute      0.00 - 0.20 x10(3) uL 0.04    Immature Granulocyte Absolute      0.00 - 1.00 x10(3) uL 0.01    Neutrophils %      % 50.3    Lymphocytes %      % 41.0    Monocytes %      % 6.0    Eosinophils %      % 1.8    Basophils %      % 0.7    Immature Granulocyte %      % 0.2    Glucose      70 - 99 mg/dL 83    Sodium      136 - 145 mmol/L 140    Potassium      3.5 - 5.1 mmol/L 4.0    Chloride      98 - 112 mmol/L 105    Carbon Dioxide, Total      21.0 - 32.0 mmol/L 31.0    ANION GAP      0 - 18 mmol/L 4    BUN      9 - 23 mg/dL 19    CREATININE      0.55 - 1.02 mg/dL 0.73    BUN/CREATININE RATIO      10.0 - 20.0  26.0 (H)    CALCIUM      8.7 - 10.4 mg/dL 9.1    CALCULATED OSMOLALITY      275 - 295 mOsm/kg 291    EGFR      >=60 mL/min/1.73m2 101    ALT (SGPT)      10 - 49 U/L 28    AST (SGOT)      <=34 U/L 28    ALKALINE PHOSPHATASE      39 - 100 U/L 91    Total Bilirubin      0.3 - 1.2 mg/dL 0.5    PROTEIN, TOTAL      5.7 - 8.2 g/dL 7.6    Albumin      3.2 - 4.8 g/dL 4.2    Globulin      2.8 - 4.4 g/dL 3.4    A/G Ratio      1.0 - 2.0  1.2    Troponin I (High Sensitivity)      <=34 ng/L <3    Magnesium, Serum      1.6 - 2.6 mg/dL 2.1    TSH      0.550 - 4.780 mIU/mL 2.600    D-Dimer      <0.50 ug/mL FEU 0.53 (H)       Legend:  (H) High    CT CHEST PE AORTA (IV ONLY) (CPT=71260)     Impression   CONCLUSION:  1. No pulmonary embolus.  2. Heterogeneous lung attenuation related to a combination of respiratory motion artifact, mild air trapping and subsegmental atelectasis.  No pneumonia or other acute finding.           Dictated by (CST): Hakeem Rader MD on 3/26/2024 at 11:40 AM         Study Conclusions CT PE protocol  1. Left ventricle: The cavity size was normal. Wall thickness was      normal. Systolic function was normal. The estimated ejection      fraction was 60-65%, by biplane method of disks. Wall motion was      normal; there were no regional wall motion abnormalities.      Doppler parameters are  consistent with abnormal left ventricular      relaxation (grade 1 diastolic dysfunction).   2. Mitral valve: Trivial regurgitation.   3. Left atrium: The atrium was normal in size.   4. Tricuspid valve: Trivial regurgitation.   5. Impressions: Normal pulmonary artery pressure.   No previous study was available for comparison.     US VENOUS DOPPLER LEG LEFT-DIAG IMG (CPT=93971     Impression   CONCLUSION:  1. No left lower extremity DVT.           Dictated by (CST): Hakeem Rader MD on 3/26/2024 at 3:06 PM        Wt Readings from Last 6 Encounters:   08/09/24 222 lb (100.7 kg)   02/29/24 218 lb 9.6 oz (99.2 kg)   01/03/24 212 lb (96.2 kg)   11/08/23 215 lb (97.5 kg)   11/01/23 221 lb (100.2 kg)   10/31/23 221 lb (100.2 kg)     Body mass index is 44.84 kg/m².  HGBA1C:    Lab Results   Component Value Date    A1C 5.6 11/08/2023    A1C 5.5 08/18/2022    A1C 5.7 (H) 06/25/2020     11/08/2023         Review of Systems   HENT:  Positive for ear pain.    Respiratory:  Negative for shortness of breath and wheezing.    Cardiovascular:  Negative for chest pain, palpitations and leg swelling.   Gastrointestinal:  Negative for abdominal pain.   Genitourinary:  Negative for difficulty urinating.   Musculoskeletal:  Positive for arthralgias and myalgias.   Skin:  Positive for rash (face  dry).   Neurological:  Positive for tingling and numbness (hand tingling wakes her up at night).         Current Outpatient Medications   Medication Sig Dispense Refill    AJOVY 225 MG/1.5ML Subcutaneous Solution Prefilled Syringe INJECT 1.5 ML INTO THE SKIN EVERY 30 DAYS 1.5 mL 0    venlafaxine ER 75 MG Oral Capsule SR 24 Hr Take 1 capsule (75 mg total) by mouth daily. 90 capsule 3    ubrogepant (UBRELVY) 100 MG Oral Tab Take one tablet at onset of migraine.  May take additional tablet in 2 hours if needed.  Do not exceed two tablets per 24 hour period. 10 tablet 0    COLLAGEN OR Take by mouth.      Multiple Vitamins-Minerals  (MULTI-VITAMIN/MINERALS) Oral Tab Take 1 tablet by mouth daily.       Allergies:  Allergies   Allergen Reactions    Sulfa Antibiotics RASH       HISTORY:  Past Medical History:    Anxiety    Chicken pox    Depression    Extrinsic asthma, unspecified    H/O seasonal allergies    Hyperopia with presbyopia of both eyes    Migraines    Morbid obesity with BMI of 50.0-59.9, adult (HCC)    ANJELICA on CPAP    Ovarian cyst    Sleep apnea    Varicose vein      Past Surgical History:   Procedure Laterality Date      2013    Cyst aspiration right      Laparoscopy, gastric restrictive procedure, with gastric bypass for morbid  2022    Myomectomy 5/> intramural myomas &/or total wt >250 gms, abdominal approach      Skin surgery  2017    R breast sebaceous cyst      Family History   Problem Relation Age of Onset    Hypertension Father     Lipids Father         HYPERLIPIDEMIA    Diabetes Father     Psychiatric Mother     Cataracts Mother     Macular degeneration Neg     Glaucoma Neg     Retinal detachment Neg       Social History:   Social History     Socioeconomic History    Marital status:     Number of children: 1   Occupational History    Occupation:     Occupation: [employer]   Tobacco Use    Smoking status: Former     Current packs/day: 0.00     Types: Cigarettes     Quit date: 1993     Years since quittin.6     Passive exposure: Past    Smokeless tobacco: Never   Vaping Use    Vaping status: Never Used   Substance and Sexual Activity    Alcohol use: Not Currently     Alcohol/week: 0.0 standard drinks of alcohol     Comment: Not frequently    Drug use: No    Sexual activity: Not Currently     Partners: Male   Other Topics Concern    Caffeine Concern Yes     Comment: 1-2 diet soda daily, 1 cup of coffee daily    Sleep Concern Yes     Comment: wakes several times    Exercise No    Pt has a pacemaker No    Pt has a defibrillator No    Reaction to local anesthetic No    Social History Narrative    The patient does not use an assistive device..      The patient does live in a home with stairs.        PHYSICAL EXAM:    Physical Exam  Constitutional:       Appearance: She is well-developed. She is not ill-appearing.   HENT:      Right Ear: Ear canal normal.      Left Ear: Ear canal normal.      Mouth/Throat:      Pharynx: Oropharynx is clear.   Eyes:      Extraocular Movements: Extraocular movements intact.      Conjunctiva/sclera: Conjunctivae normal.      Pupils: Pupils are equal, round, and reactive to light.   Cardiovascular:      Rate and Rhythm: Normal rate and regular rhythm.      Heart sounds: Normal heart sounds.   Pulmonary:      Effort: Pulmonary effort is normal.      Breath sounds: Normal breath sounds.   Abdominal:      General: Bowel sounds are normal.      Palpations: Abdomen is soft.   Musculoskeletal:         General: Tenderness (shoulder with decrease ROM) present.   Skin:     General: Skin is warm and dry.      Findings: Rash (dry patched face) present.   Neurological:      Mental Status: She is alert.   Psychiatric:         Mood and Affect: Mood normal.     Flexion of wrist  reproduces hand dingling         ASSESSMENT/PLAN:   (G56.03) Bilateral carpal tunnel syndrome  (primary encounter diagnosis)  Plan: ORTHOPEDIC - INTERNAL      Follow up with ortho  Wrist splint at night  discuss prevention    (M25.511,  G89.29,  M25.512) Chronic pain of both shoulders  Plan: ORTHOPEDIC - INTERNAL        Follow upw ith orth    (L30.9) Dermatitis  Plan: DERM - INTERNAL        Fragrance free  hypoallergenic  prducts    (M77.8) Tendinitis of shoulder, unspecified laterality  Plan:as above    (M79.7) Fibromyalgia  Plan: aerobic exercise    daily    (E66.01,  Z68.41) Class 3 severe obesity due to excess calories without serious comorbidity with body mass index (BMI) of 40.0 to 44.9 in adult (HCC)  Plan: pt lost 100 lbs  she plateau  cont wt loss healthy diet     Patient voiced  understanding  and agrees with plan         No orders of the defined types were placed in this encounter.      Meds This Visit:  Requested Prescriptions      No prescriptions requested or ordered in this encounter       Imaging & Referrals:  ORTHOPEDIC - INTERNAL  DERM - INTERNAL        ID#[id number]

## 2024-09-14 ENCOUNTER — HOSPITAL ENCOUNTER (OUTPATIENT)
Age: 49
Discharge: HOME | End: 2024-09-14
Attending: EMERGENCY MEDICINE
Payer: MEDICAID

## 2024-09-14 VITALS
OXYGEN SATURATION: 98 % | SYSTOLIC BLOOD PRESSURE: 113 MMHG | DIASTOLIC BLOOD PRESSURE: 83 MMHG | TEMPERATURE: 98 F | HEART RATE: 74 BPM | RESPIRATION RATE: 20 BRPM

## 2024-09-14 DIAGNOSIS — L29.9 EAR ITCHING: Primary | ICD-10-CM

## 2024-09-14 PROCEDURE — 99213 OFFICE O/P EST LOW 20 MIN: CPT

## 2024-09-14 PROCEDURE — 99212 OFFICE O/P EST SF 10 MIN: CPT

## 2024-09-14 NOTE — ED PROVIDER NOTES
Patient Seen in: [facility]      History     Chief Complaint   Patient presents with    Ear Problem Pain     Stated Complaint: Ears    Subjective:   HPI    Patient is a 49-year-old female with no significant past medical history who presents now with bilateral ear discomfort.  The patient states she has been experiencing some itching and popping of her ears for the last several days.  The patient states his symptoms can involve either ear.  The patient denies any decreased hearing or trauma to the ear.  The patient denies any nasal congestion or cold symptoms    Objective:   Past Medical History:    Anxiety    Chicken pox    Depression    Extrinsic asthma, unspecified    H/O seasonal allergies    Hyperopia with presbyopia of both eyes    Migraines    Morbid obesity with BMI of 50.0-59.9, adult (HCC)    ANJELICA on CPAP    Ovarian cyst    Sleep apnea    Varicose vein              No pertinent past surgical history.              No pertinent social history.            Review of Systems    Positive for stated Chief Complaint: Ear Problem Pain    Other systems are as noted in HPI.  Constitutional and vital signs reviewed.      All other systems reviewed and negative except as noted above.    Physical Exam     ED Triage Vitals [09/14/24 1353]   /83   Pulse 74   Resp 20   Temp 97.6 °F (36.4 °C)   Temp src Temporal   SpO2 98 %   O2 Device None (Room air)       Current Vitals:   Vital Signs  BP: 113/83  Pulse: 74  Resp: 20  Temp: 97.6 °F (36.4 °C)  Temp src: Temporal    Oxygen Therapy  SpO2: 98 %  O2 Device: None (Room air)            Physical Exam    Constitutional: Well-developed well-nourished in no acute distress  Head: Normocephalic, no swelling or tenderness  Eyes: Nonicteric sclera, no conjunctival injection  ENT: TMs are clear and flat bilaterally.  There is no posterior pharyngeal erythema.  There is no significant swelling or redness of the external auditory canals  Chest: Clear to auscultation, no  tenderness  Cardiovascular: Regular rate and rhythm without murmur  Abdomen: Soft, nontender and nondistended  Neurologic: Patient is awake, alert and oriented ×3.  The patient's motor strength is 5 out of 5 and symmetric in the upper and lower extremities bilaterally  Extremities: No focal swelling or tenderness  Skin: No pallor, no redness or warmth to the touch      ED Course   Labs Reviewed - No data to display          Probable viral etiology of minimal fluid in the middle ear without evidence of infection.  Recommend Mucinex for decongestion.  Will provide with ENT follow-up         MDM      Viral URI versus otitis media versus otitis externa                                   Medical Decision Making      Disposition and Plan     Clinical Impression:  1. Ear itching         Disposition:  Discharge  9/14/2024  2:06 pm    Follow-up:  Josie White MD  [address] [place] [address] [id number] [phone]      As needed          Medications Prescribed:  Current Discharge Medication List

## 2024-09-14 NOTE — ED INITIAL ASSESSMENT (HPI)
Patient with intermittent bilateral ear pain over the last few week.  Denies fevers.  Reports pain as itchy and achy.

## 2024-10-06 DIAGNOSIS — G43.011 INTRACTABLE MIGRAINE WITHOUT AURA AND WITH STATUS MIGRAINOSUS: ICD-10-CM

## 2024-10-07 NOTE — TELEPHONE ENCOUNTER
Medication: AJOVY 225 MG/1.5ML Subcutaneous Solution Prefilled Syringe      Date of last refill: 7/1/2024  (#1.5 mL/ 0)  [place]02/24     Last office visit: 11/1/2023   Due back to clinic per last office note:  3 Months  Date next office visit scheduled:    No future appointments.        Last OV note recommendation:    ASSESSMENT:  The patient is a 48 year old woman with a history of migraine, depression, GERD, ANJELICA who presents for follow up regarding intractable migraines w/ repeated episodes of status migrainosus.    Gabapentin has not been effective.  Prior medication trials: Topiramate (blurry vision, poor p.o. intake), gabapentin (ineffective), venlafaxine (not effective for migraine); Sumatriptan, Rizatriptan, Almotriptan - all tried, not effective/side effects   -MRI brain 7/16/2021: No acute findings, few white matter signal foci likely related to migraine  -CT brain 2/13/2022 normal     Migraine without aura with status migrainosus   -Preventative: resume Ajovy                 Consideration: Emgality, verapamil  -Abortive: Ubrelvy as needed                 Future consideration: Nurtec                 Limit <2 times per week to avoid developing medication overuse headaches   -Patient should let my office know if she becomes pregnant or plan to become pregnant so we can adjust/stop medication safely.

## 2024-10-08 RX ORDER — FREMANEZUMAB-VFRM 225 MG/1.5ML
INJECTION SUBCUTANEOUS
Qty: 1.5 ML | Refills: 0 | Status: SHIPPED | OUTPATIENT
Start: 2024-10-08

## 2024-10-11 ENCOUNTER — TELEPHONE (OUTPATIENT)
Dept: INTERNAL MEDICINE CLINIC | Facility: CLINIC | Age: 49
End: 2024-10-11

## 2024-10-11 NOTE — TELEPHONE ENCOUNTER
Patient called and said that [facility] of plastic surgery faxed over a clearance form that is needed for her procedure on Tuesday, Please confirm if form was received. Needs them by end of day today.     [facility]   Phone number: [phone]    FAX: [phone]

## 2024-10-11 NOTE — TELEPHONE ENCOUNTER
Patient phoned back and schedule an appointment to see Dr. Trotter on 10-14-24. This was approved per the message below.

## 2024-10-14 ENCOUNTER — LAB ENCOUNTER (OUTPATIENT)
Dept: LAB | Age: 49
End: 2024-10-14
Attending: STUDENT IN AN ORGANIZED HEALTH CARE EDUCATION/TRAINING PROGRAM
Payer: MEDICAID

## 2024-10-14 ENCOUNTER — OFFICE VISIT (OUTPATIENT)
Dept: INTERNAL MEDICINE CLINIC | Facility: CLINIC | Age: 49
End: 2024-10-14
Payer: MEDICAID

## 2024-10-14 VITALS
WEIGHT: 215 LBS | DIASTOLIC BLOOD PRESSURE: 75 MMHG | HEART RATE: 76 BPM | BODY MASS INDEX: 43.34 KG/M2 | SYSTOLIC BLOOD PRESSURE: 110 MMHG | TEMPERATURE: 98 F | HEIGHT: 59 IN | OXYGEN SATURATION: 98 %

## 2024-10-14 DIAGNOSIS — Z01.818 PRE-OPERATIVE EXAMINATION: ICD-10-CM

## 2024-10-14 DIAGNOSIS — Z01.818 PRE-OPERATIVE EXAMINATION: Primary | ICD-10-CM

## 2024-10-14 LAB
ALBUMIN SERPL-MCNC: 4.4 G/DL (ref 3.2–4.8)
ALBUMIN/GLOB SERPL: 1.4 {RATIO} (ref 1–2)
ALP LIVER SERPL-CCNC: 79 U/L
ALT SERPL-CCNC: 14 U/L
ANION GAP SERPL CALC-SCNC: 5 MMOL/L (ref 0–18)
AST SERPL-CCNC: 22 U/L (ref ?–34)
BASOPHILS # BLD AUTO: 0.04 X10(3) UL (ref 0–0.2)
BASOPHILS NFR BLD AUTO: 0.6 %
BILIRUB SERPL-MCNC: 0.6 MG/DL (ref 0.3–1.2)
BUN BLD-MCNC: 21 MG/DL (ref 9–23)
BUN/CREAT SERPL: 33.3 (ref 10–20)
CALCIUM BLD-MCNC: 9.4 MG/DL (ref 8.7–10.4)
CHLORIDE SERPL-SCNC: 106 MMOL/L (ref 98–112)
CO2 SERPL-SCNC: 30 MMOL/L (ref 21–32)
CREAT BLD-MCNC: 0.63 MG/DL
DEPRECATED RDW RBC AUTO: 43.1 FL (ref 35.1–46.3)
EGFRCR SERPLBLD CKD-EPI 2021: 109 ML/MIN/1.73M2 (ref 60–?)
EOSINOPHIL # BLD AUTO: 0.13 X10(3) UL (ref 0–0.7)
EOSINOPHIL NFR BLD AUTO: 2 %
ERYTHROCYTE [DISTWIDTH] IN BLOOD BY AUTOMATED COUNT: 13.1 % (ref 11–15)
FASTING STATUS PATIENT QL REPORTED: NO
GLOBULIN PLAS-MCNC: 3.1 G/DL (ref 2–3.5)
GLUCOSE BLD-MCNC: 77 MG/DL (ref 70–99)
HCT VFR BLD AUTO: 42.7 %
HGB BLD-MCNC: 13.8 G/DL
IMM GRANULOCYTES # BLD AUTO: 0.01 X10(3) UL (ref 0–1)
IMM GRANULOCYTES NFR BLD: 0.2 %
LYMPHOCYTES # BLD AUTO: 2 X10(3) UL (ref 1–4)
LYMPHOCYTES NFR BLD AUTO: 31.1 %
MCH RBC QN AUTO: 29.3 PG (ref 26–34)
MCHC RBC AUTO-ENTMCNC: 32.3 G/DL (ref 31–37)
MCV RBC AUTO: 90.7 FL
MONOCYTES # BLD AUTO: 0.46 X10(3) UL (ref 0.1–1)
MONOCYTES NFR BLD AUTO: 7.1 %
NEUTROPHILS # BLD AUTO: 3.8 X10 (3) UL (ref 1.5–7.7)
NEUTROPHILS # BLD AUTO: 3.8 X10(3) UL (ref 1.5–7.7)
NEUTROPHILS NFR BLD AUTO: 59 %
OSMOLALITY SERPL CALC.SUM OF ELEC: 294 MOSM/KG (ref 275–295)
PLATELET # BLD AUTO: 223 10(3)UL (ref 150–450)
POTASSIUM SERPL-SCNC: 4.2 MMOL/L (ref 3.5–5.1)
PROT SERPL-MCNC: 7.5 G/DL (ref 5.7–8.2)
RBC # BLD AUTO: 4.71 X10(6)UL
SODIUM SERPL-SCNC: 141 MMOL/L (ref 136–145)
WBC # BLD AUTO: 6.4 X10(3) UL (ref 4–11)

## 2024-10-14 PROCEDURE — 80053 COMPREHEN METABOLIC PANEL: CPT

## 2024-10-14 PROCEDURE — 85025 COMPLETE CBC W/AUTO DIFF WBC: CPT

## 2024-10-14 PROCEDURE — 36415 COLL VENOUS BLD VENIPUNCTURE: CPT

## 2024-10-14 RX ORDER — HYDROCODONE BITARTRATE AND ACETAMINOPHEN 7.5; 325 MG/1; MG/1
1-2 TABLET ORAL
COMMUNITY
Start: 2024-10-15

## 2024-10-14 NOTE — PROGRESS NOTES
OFFICE NOTE     Patient ID: Alix Ordonez is a 49 year old female.  Today's Date: 10/14/24  Chief Complaint: Pre-Op Exam (Surgery: 10/15/24 tummy tuck and lipo above knees with Dr Parrish Fisher)                        Alix Ordonez presents for preoperative clearance for: Tummy tuck and lipo above knees  Performing Physician: Dr. Tina Fisher   Date of surgery: 10/15/202  Elective or emergency: elective  Pre-operative forms provided: No    Current complaints, if any:       Active medical problems:   Patient Active Problem List   Diagnosis    Migraine    Morbid obesity with BMI of 60.0-69.9, adult (HCC)    ANJELICA on CPAP    Binge eating    Map-dot-fingerprint corneal dystrophy    Insulin resistance    Gastroesophageal reflux disease         Cervical/neck:   - Arthritis: No  - Neck pain: No  - Difficulty with ROM: No  - Prior neck injury/surgery: No    Cardiac:  - History of ischemic coronary disease: No  - History of heart failure: No  - Heart attack in past 90 days: No  - Chest pain in last 90 days: No  - History of Arrhythmia:  No  - History of stroke or TIA:   (in past 6-9months?)No  - Diabetes/A1C: Last A1c value was 5.6% done 11/8/2023.      - Anticoagulation/Warfarin/ASA/NOAC: No  - Echo if available:  - Stress test if available:     Pulmonary:   - Smoker: No  - Apnea/CPAP: Yes; comment: CPAP and adherent   - Asthma/COPD:No  - Difficulty laying flat for extended period of time: No  - Dyspnea/exertional: No  - Respiratory Medications: No    Functional Capacity:   Perform ADLs- eating, dress, toilet (1 METs)? Yes, patient can perform.   Walk up flight of steps, hill, walk ground level 3-4mph (4 METs)? Yes, patient can perform.   Perform heavy housework- scrubbing floors, move heavy furniture, climb 2 flights of stairs (4-10 METs)? Yes; comment:    Participate in strenuous sports- swimming, singles tennis, football, basketball, ski (+10 METs)?  No        Vitals:    10/14/24 1504   BP: 110/75    Pulse: 76   Temp: 98.3 °F (36.8 °C)   TempSrc: Oral   SpO2: 98%   Weight: 215 lb (97.5 kg)   Height: 4' 11\" (1.499 m)     body mass index is 43.42 kg/m².  BP Readings from Last 3 Encounters:   10/14/24 110/75   09/14/24 113/83   08/09/24 133/84     The 10-year ASCVD risk score (Citlali BENNETT, et al., 2019) is: 0.8%    Values used to calculate the score:      Age: 49 years      Sex: Female      Is Non- : No      Diabetic: No      Tobacco smoker: No      Systolic Blood Pressure: 110 mmHg      Is BP treated: No      HDL Cholesterol: 44 mg/dL      Total Cholesterol: 150 mg/dL      Medications reviewed:  Current Outpatient Medications   Medication Sig Dispense Refill    AJOVY 225 MG/1.5ML Subcutaneous Solution Prefilled Syringe INJECT 1.5 ML INTO THE SKIN EVERY 30 DAYS 1.5 mL 0    venlafaxine ER 75 MG Oral Capsule SR 24 Hr Take 1 capsule (75 mg total) by mouth daily. 90 capsule 3    ubrogepant (UBRELVY) 100 MG Oral Tab Take one tablet at onset of migraine.  May take additional tablet in 2 hours if needed.  Do not exceed two tablets per 24 hour period. 10 tablet 0    COLLAGEN OR Take by mouth.      Multiple Vitamins-Minerals (MULTI-VITAMIN/MINERALS) Oral Tab Take 1 tablet by mouth daily.      [START ON 10/15/2024] HYDROcodone-acetaminophen 7.5-325 MG Oral Tab Take 1-2 tablets by mouth every 4 to 6 hours as needed for Pain. As needed after surgery           Assessment & Plan    1. Pre-operative examination (Primary)  -     CBC With Differential With Platelet; Future; Expected date: 10/14/2024  -     Comp Metabolic Panel (14); Future; Expected date: 10/14/2024  CBC and CMP ordered and reviewed, within normal.  Plan:    #Revised Cardiac Risk Index  0 points- LOW Risk of complications  Class I Risk  3.9 %  30-day risk of death, MI, or cardiac arrest    From Duceppe 2017. These numbers are higher than those from the original study (Drew 1999). See Evidence for details.     Dr. Tina Fisher  to proceed with  surgical intervention if benefits outweigh risks at time of procedure. Patient instructed to follow all pre and post surgical advice. Surgeon and anesthesiology physicians to make final decision at time of procedure based upon patients clinical status.       Follow Up: As needed/if symptoms worsen or No follow-ups on file..     I spent 55 minutes obtaining pertitent medical history, reviewing pertinent imaging/labs and specialists notes, evaluating patient, discussing differential diagnosis' and various treatment options, reinforcing importance of compliance with treatment plan, and completing documentation.     Encounter Times  PreChartin minutes    Reviewing/Obtainin minutes      Medical Exam: 5 minutes    Plan: 5 minutes      Notes: 10 minutes    Counseling/Education: 7 minutes      Referring/Communicating:   minutes    Ind Interpretation:   minutes      Care Coordination:   minutes       My total time spent caring for the patient on the day of the encounter: 55 minutes.       Objective/ Results:   Physical Exam  Constitutional:       Appearance: She is well-developed.   Cardiovascular:      Rate and Rhythm: Normal rate and regular rhythm.      Heart sounds: Normal heart sounds.   Pulmonary:      Effort: Pulmonary effort is normal.      Breath sounds: Normal breath sounds.   Abdominal:      General: Bowel sounds are normal.      Palpations: Abdomen is soft.   Skin:     General: Skin is warm and dry.   Neurological:      Mental Status: She is alert and oriented to person, place, and time.      Deep Tendon Reflexes: Reflexes are normal and symmetric.        Reviewed:    Patient Active Problem List    Diagnosis    Gastroesophageal reflux disease    Insulin resistance    Map-dot-fingerprint corneal dystrophy    Morbid obesity with BMI of 60.0-69.9, adult (HCC)    ANJELICA on CPAP    Binge eating    Migraine      Allergies[1]     Social History     Socioeconomic History    Marital status:     Number of  children: 1   Occupational History    Occupation:     Occupation: [employer]   Tobacco Use    Smoking status: Former     Current packs/day: 0.00     Types: Cigarettes     Quit date: 1993     Years since quittin.8     Passive exposure: Past    Smokeless tobacco: Never   Vaping Use    Vaping status: Never Used   Substance and Sexual Activity    Alcohol use: Yes     Comment: red wine on occ    Drug use: No    Sexual activity: Not Currently     Partners: Male   Other Topics Concern    Caffeine Concern Yes     Comment: 1-2 diet soda daily, 1 cup of coffee daily    Sleep Concern Yes     Comment: wakes several times    Exercise No    Pt has a pacemaker No    Pt has a defibrillator No    Reaction to local anesthetic No   Social History Narrative    The patient does not use an assistive device..      The patient does live in a home with stairs.      Review of Systems   Constitutional: Negative.    HENT: Negative.     Eyes: Negative.    Respiratory: Negative.     Cardiovascular: Negative.    Gastrointestinal: Negative.    Genitourinary: Negative.    Musculoskeletal: Negative.    Skin: Negative.    Neurological: Negative.    Psychiatric/Behavioral: Negative.       All other systems negative unless otherwise stated in ROS or HPI above.       Jroge Trotter MD  Internal Medicine       Call office with any questions or seek emergency care if necessary.   Patient understands and agrees to follow directions and advice.      ----------------------------------------- PATIENT INSTRUCTIONS-----------------------------------------     There are no Patient Instructions on file for this visit.        The 21st Century Cures Act makes medical notes available to patients in the interest of transparency.  However, please be advised that this is a medical document.  It is intended as a peer to peer communication.  It is written in medical language and may contain abbreviations or verbiage that are technical and  unfamiliar.  It may appear blunt or direct.  Medical documents are intended to carry relevant information, facts as evident, and the clinical opinion of the practitioner.          [1]   Allergies  Allergen Reactions    Sulfa Antibiotics RASH

## 2024-10-22 ENCOUNTER — TELEMEDICINE (OUTPATIENT)
Dept: NEUROLOGY | Facility: CLINIC | Age: 49
End: 2024-10-22
Payer: MEDICAID

## 2024-10-22 DIAGNOSIS — G43.001 MIGRAINE WITHOUT AURA AND WITH STATUS MIGRAINOSUS, NOT INTRACTABLE: Primary | ICD-10-CM

## 2024-10-22 PROCEDURE — 99213 OFFICE O/P EST LOW 20 MIN: CPT | Performed by: OTHER

## 2024-10-22 RX ORDER — FREMANEZUMAB-VFRM 225 MG/1.5ML
225 INJECTION SUBCUTANEOUS
Qty: 1.68 ML | Refills: 5 | Status: SHIPPED | OUTPATIENT
Start: 2024-10-22 | End: 2024-11-19

## 2024-10-22 RX ORDER — UBROGEPANT 100 MG/1
TABLET ORAL
Qty: 10 TABLET | Refills: 11 | Status: SHIPPED | OUTPATIENT
Start: 2024-10-22 | End: 2025-10-22

## 2024-10-22 NOTE — PROGRESS NOTES
[address], [address]  [facility] [address]  [phone]          Established  Follow Up Visit   DISTANCE HEALTH VISIT          Telehealth using [employer] [facility] Verbal Consent   I conducted a telehealth visit with Alix Ordonez today, 10/22/2024, which was completed using two-way, real-time interactive audio and video communication. This has been done in good анна to provide continuity of care in the best interest of the provider-patient relationship, due to the COVID -19 public health crisis/national emergency where restrictions of face-to-face office visits are ongoing. Every conscious effort was taken to allow for sufficient and adequate time to complete the visit.  The patient was made aware of the limitations of the telehealth visit, including treatment limitations as no physical exam could be performed.  The patient was advised to call 911 or to go to the ER in case there was an emergency.  The patient was also advised of the potential privacy & security concerns related to the telehealth platform.   The patient was made aware of where to find [facility]'s notice of privacy practices, telehealth consent form and other related consent forms and documents.  which are located on the [facility] website. The patient verbally agreed to telehealth consent form, related consents and the risks discussed.    Lastly, the patient confirmed that they were in [place].   Included in this visit, time may have been spent reviewing labs, medications, radiology tests and decision making. Appropriate medical decision-making and tests are ordered as detailed in the plan of care above.  Coding/billing information is submitted for this visit based on complexity of care and/or time spent for the visit.  5 min spent with the patient on the phone    Date: October 22, 2024  Patient Name: Alix Ordonez   MRN: [id number]  Primary physician: [address] [facility] [id number]    Interval History:    --\"Migraines: same characteristics - frontally, eye pain --> radiates to vertex/occiput, throbbing, photophobia, phonophobia, nausea. Any rescue makes her feel worse before better. Vertex would feel \"hot.\"     -- Migraines are rare now - only had when she was late for Ajovy by a day or 2.  Ubrelvy is effective.        OUTPATIENT MEDICATIONS  Medications Ordered Prior to Encounter[1]      PHYSICAL EXAM:     LMP 08/15/2024 (Approximate)   General appearance: Well appearing, and in no acute distress  Skin: skin color, texture normal.  No rashes or lesions.    Head: Normocephalic, atraumatic.    Neurological exam:    Mental Status:   Attention/Concentration: intact attention on bedside test   Fund of knowledge: intact  Speech: no dysarthria or aphasia         LABS/DATA:  Reviewed CBC, CMP       IMAGING:  N/A    ASSESSMENT:  The patient is a 49 year old  woman with a history of migraine, depression, GERD, ANJELICA who presents for follow up regarding intractable migraines w/ repeated episodes of status migrainosus.    Gabapentin has not been effective.  Prior medication trials: Topiramate (blurry vision, poor p.o. intake), gabapentin (ineffective), venlafaxine (not effective for migraine); Sumatriptan, Rizatriptan, Almotriptan - all tried, not effective/side effects   -MRI brain 7/16/2021: No acute findings, few white matter signal foci likely related to migraine  -CT brain 2/13/2022 normal     Migraine without aura with status migrainosus   -Preventative: Ajovy - she prefers to stay on medication for now rather than giving a trial period off to see where her migraine frequency is - this is reasonable.  Can readdress as she prefers.                  Consideration: Emgality, verapamil  -Abortive: Ubrelvy as needed .  She is also welcome to try over the counters as they may be effective now.                  Future consideration: Nurtec                 Limit <2 times per week to avoid developing medication overuse headaches    -Patient should let my office know if she becomes pregnant or plan to become pregnant so we can adjust/stop medication safely.        Discussed indication, administration, dose, and side effects with patient of any medications personally prescribed. Patient was advised to let my office know if they have any questions or concerns.       Today, I personally spent 10 minutes in this case, including chart review, time spent with patient doing face to face evaluation w/ interview and exam and patient education, counseling, and time was spent in patient education, counseling, and coordination of care as described above.   Issues discussed: Diagnosis and implications on future health, benefits and side effects of present and future medications, test results as well as further testing and medications required.    This note was prepared using Dragon Medical voice recognition dictation software and as a result, errors may occur. When identified, these errors have been corrected. While every attempt is made to correct errors during dictation, discrepancies may still exist    TESFAYE Padilla DO   Staff Physician, Neurology   10/22/2024  2:17 PM               [1]   Current Outpatient Medications on File Prior to Visit   Medication Sig Dispense Refill    HYDROcodone-acetaminophen 7.5-325 MG Oral Tab Take 1-2 tablets by mouth every 4 to 6 hours as needed for Pain. As needed after surgery      AJOVY 225 MG/1.5ML Subcutaneous Solution Prefilled Syringe INJECT 1.5 ML INTO THE SKIN EVERY 30 DAYS 1.5 mL 0    venlafaxine ER 75 MG Oral Capsule SR 24 Hr Take 1 capsule (75 mg total) by mouth daily. 90 capsule 3    ubrogepant (UBRELVY) 100 MG Oral Tab Take one tablet at onset of migraine.  May take additional tablet in 2 hours if needed.  Do not exceed two tablets per 24 hour period. 10 tablet 0    COLLAGEN OR Take by mouth.      Multiple Vitamins-Minerals (MULTI-VITAMIN/MINERALS) Oral Tab Take 1 tablet by mouth daily.       No current  facility-administered medications on file prior to visit.

## 2024-11-04 RX ORDER — FREMANEZUMAB-VFRM 225 MG/1.5ML
INJECTION SUBCUTANEOUS
Qty: 1.5 ML | Refills: 0 | OUTPATIENT
Start: 2024-11-04

## 2025-02-17 ENCOUNTER — HOSPITAL ENCOUNTER (OUTPATIENT)
Dept: MAMMOGRAPHY | Age: 50
Discharge: HOME | End: 2025-02-17
Attending: OBSTETRICS & GYNECOLOGY
Payer: MEDICAID

## 2025-02-17 DIAGNOSIS — Z12.31 ENCOUNTER FOR SCREENING MAMMOGRAM FOR BREAST CANCER: ICD-10-CM

## 2025-02-17 PROCEDURE — 77067 SCR MAMMO BI INCL CAD: CPT | Performed by: OBSTETRICS & GYNECOLOGY

## 2025-02-17 PROCEDURE — 77063 BREAST TOMOSYNTHESIS BI: CPT | Performed by: OBSTETRICS & GYNECOLOGY

## 2025-03-12 DIAGNOSIS — Z76.0 MEDICATION REFILL: ICD-10-CM

## 2025-03-14 RX ORDER — VENLAFAXINE HYDROCHLORIDE 75 MG/1
75 CAPSULE, EXTENDED RELEASE ORAL DAILY
Qty: 90 CAPSULE | Refills: 0 | Status: SHIPPED | OUTPATIENT
Start: 2025-03-14

## 2025-03-14 NOTE — TELEPHONE ENCOUNTER
Last annual - 2/29/2024  Last pap - 2/29/2024, normal  Last mammogram - 2/17/2025, negative    Annual scheduled on 6/3/2025  Last refill was sent on 3/25/2024 for #90 with 3 refills    Message to Dr. Hensley.  Ok to refill until annual?

## 2025-03-21 ENCOUNTER — TELEPHONE (OUTPATIENT)
Dept: NEUROLOGY | Facility: CLINIC | Age: 50
End: 2025-03-21

## 2025-04-04 ENCOUNTER — MED REC SCAN ONLY (OUTPATIENT)
Dept: NEUROLOGY | Facility: CLINIC | Age: 50
End: 2025-04-04

## 2025-04-06 ENCOUNTER — HOSPITAL ENCOUNTER (OUTPATIENT)
Age: 50
Discharge: HOME | End: 2025-04-06
Attending: EMERGENCY MEDICINE
Payer: MEDICAID

## 2025-04-06 VITALS
SYSTOLIC BLOOD PRESSURE: 130 MMHG | TEMPERATURE: 99 F | HEART RATE: 74 BPM | DIASTOLIC BLOOD PRESSURE: 74 MMHG | OXYGEN SATURATION: 100 % | RESPIRATION RATE: 18 BRPM

## 2025-04-06 DIAGNOSIS — J02.9 VIRAL PHARYNGITIS: Primary | ICD-10-CM

## 2025-04-06 LAB — S PYO AG THROAT QL IA.RAPID: NEGATIVE

## 2025-04-06 PROCEDURE — 99213 OFFICE O/P EST LOW 20 MIN: CPT

## 2025-04-06 PROCEDURE — 99212 OFFICE O/P EST SF 10 MIN: CPT

## 2025-04-06 PROCEDURE — 87651 STREP A DNA AMP PROBE: CPT | Performed by: EMERGENCY MEDICINE

## 2025-04-06 NOTE — ED INITIAL ASSESSMENT (HPI)
Patient arrived ambulatory to room c/o symptoms that started yesterday. +sore throat. No nasal congestion. No cough. No fevers. No n/v/d. Easy non labored respirations. No distress.

## 2025-04-06 NOTE — ED PROVIDER NOTES
Patient Seen in: [facility]      History     Chief Complaint   Patient presents with    Sore Throat     Stated Complaint: sore throat, ear compllaint    Subjective:   HPI      The patient is a 49-year-old female with past history of asthma who presents now with sore throat.  Patient states the symptoms began yesterday.  The patient denies any fever, cough, significant nasal congestion.  Patient states sore throat was initially somewhat mild but is worsening today.  Patient also describes some \"itching\" of the ears bilaterally.  The patient denies any flu or COVID concerns    Objective:     Past Medical History:    Anxiety    Chicken pox    Depression    Extrinsic asthma, unspecified    H/O seasonal allergies    Hyperopia with presbyopia of both eyes    Migraines    Morbid obesity with BMI of 50.0-59.9, adult (HCC)    ANJELICA on CPAP    Ovarian cyst    Sleep apnea    Varicose vein              Past Surgical History:   Procedure Laterality Date      2013    Cyst aspiration right      Laparoscopy, gastric restrictive procedure, with gastric bypass for morbid  2022    Myomectomy 5/> intramural myomas &/or total wt >250 gms, abdominal approach      Skin surgery  2017    R breast sebaceous cyst                Social History     Socioeconomic History    Marital status:     Number of children: 1   Occupational History    Occupation:     Occupation: [employer]   Tobacco Use    Smoking status: Former     Current packs/day: 0.00     Types: Cigarettes     Quit date: 1993     Years since quittin.2     Passive exposure: Past    Smokeless tobacco: Never   Vaping Use    Vaping status: Never Used   Substance and Sexual Activity    Alcohol use: Yes     Comment: red wine on occ    Drug use: No    Sexual activity: Not Currently     Partners: Male   Other Topics Concern    Caffeine Concern Yes     Comment: 1-2 diet soda daily, 1 cup of coffee daily    Sleep Concern  Yes     Comment: wakes several times    Exercise No    Pt has a pacemaker No    Pt has a defibrillator No    Reaction to local anesthetic No   Social History Narrative    The patient does not use an assistive device..      The patient does live in a home with stairs.              Review of Systems    Positive for stated complaint: sore throat, ear compllaint  Other systems are as noted in HPI.  Constitutional and vital signs reviewed.      All other systems reviewed and negative except as noted above.    Physical Exam     ED Triage Vitals [04/06/25 1426]   /74   Pulse 74   Resp 18   Temp 98.6 °F (37 °C)   Temp src    SpO2 100 %   O2 Device None (Room air)       Current Vitals:   Vital Signs  BP: 130/74  Pulse: 74  Resp: 18  Temp: 98.6 °F (37 °C)    Oxygen Therapy  SpO2: 100 %  O2 Device: None (Room air)        Physical Exam    Constitutional: Well-developed well-nourished in no acute distress  Head: Normocephalic, no swelling or tenderness  Eyes: Nonicteric sclera, no conjunctival injection  ENT: TMs are clear and flat bilaterally.  There is minimal posterior pharyngeal erythema  Chest: Clear to auscultation, no tenderness  Cardiovascular: Regular rate and rhythm without murmur  Abdomen: Soft, nontender and nondistended  Neurologic: Patient is awake, alert and oriented ×3.  The patient's motor strength is 5 out of 5 and symmetric in the upper and lower extremities bilaterally  Extremities: No focal swelling or tenderness  Skin: No pallor, no redness or warmth to the touch      ED Course     Labs Reviewed   RAPID STREP A - Normal     Patient was notified of her negative strep.  Probable viral etiology of the patient's symptoms       Pulse ox is 100% on room air, normal.  Vital signs are stable       MDM      Viral URI versus strep throat        Medical Decision Making      Disposition and Plan     Clinical Impression:  1. Viral pharyngitis         Disposition:  Discharge  4/6/2025  2:33  pm    Follow-up:  Masood Yu MD  [address] [place] [id number]  [phone]      As needed          Medications Prescribed:  Current Discharge Medication List              Supplementary Documentation:

## 2025-05-09 ENCOUNTER — HOSPITAL ENCOUNTER (OUTPATIENT)
Age: 50
Discharge: HOME | End: 2025-05-09
Payer: MEDICAID

## 2025-05-09 VITALS
OXYGEN SATURATION: 99 % | TEMPERATURE: 98 F | RESPIRATION RATE: 12 BRPM | DIASTOLIC BLOOD PRESSURE: 74 MMHG | SYSTOLIC BLOOD PRESSURE: 134 MMHG | HEART RATE: 72 BPM

## 2025-05-09 DIAGNOSIS — L24.3 IRRITANT CONTACT DERMATITIS DUE TO COSMETICS: Primary | ICD-10-CM

## 2025-05-09 PROCEDURE — 99213 OFFICE O/P EST LOW 20 MIN: CPT

## 2025-05-09 RX ORDER — PREDNISONE 20 MG/1
40 TABLET ORAL DAILY
Qty: 14 TABLET | Refills: 0 | Status: SHIPPED | OUTPATIENT
Start: 2025-05-09 | End: 2025-05-16

## 2025-05-09 NOTE — ED INITIAL ASSESSMENT (HPI)
Presents with allergic reaction today after using a new Vitamin C serum on her face yesterday. + redness, itching, and burning to face. + eyelid swelling. Took Claritin without change. No throat swelling. Denies SOB or difficulty breathing.

## 2025-05-09 NOTE — ED PROVIDER NOTES
Patient Seen in: [facility]      History     Chief Complaint   Patient presents with    Allergic Rxn Allergies     Stated Complaint: allergic reaction    Subjective:   HPI    49-year-old female presents with an allergic reaction to the vitamin C serum she applied to her face yesterday.  She presents with facial erythema and swelling.  There also small raised blisterlike lesions.  There is no lip tongue or intraoral swelling.  There is no respiratory distress.    History of Present Illness               Objective:     Past Medical History:    Anxiety    Chicken pox    Depression    Extrinsic asthma, unspecified    H/O seasonal allergies    Hyperopia with presbyopia of both eyes    Migraines    Morbid obesity with BMI of 50.0-59.9, adult (HCC)    ANJELICA on CPAP    Ovarian cyst    Sleep apnea    Varicose vein              Past Surgical History:   Procedure Laterality Date          Cyst aspiration right      Laparoscopy, gastric restrictive procedure, with gastric bypass for morbid  2022    Myomectomy 5/> intramural myomas &/or total wt >250 gms, abdominal approach      Skin surgery  2017    R breast sebaceous cyst                Social History     Socioeconomic History    Marital status:     Number of children: 1   Occupational History    Occupation:     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   Tobacco Use    Smoking status: Former     Current packs/day: 0.00     Types: Cigarettes     Quit date: 1993     Years since quittin.3     Passive exposure: Past    Smokeless tobacco: Never   Vaping Use    Vaping status: Never Used   Substance and Sexual Activity    Alcohol use: Yes     Comment: red wine on occ    Drug use: No    Sexual activity: Not Currently     Partners: Male   Other Topics Concern    Caffeine Concern Yes     Comment: 1-2 diet soda daily, 1 cup of coffee daily    Sleep Concern Yes     Comment: wakes several times    Exercise No    Pt has a pacemaker  No    Pt has a defibrillator No    Reaction to local anesthetic No   Social History Narrative    The patient does not use an assistive device..      The patient does live in a home with stairs.              Review of Systems    Positive for stated complaint: allergic reaction  Other systems are as noted in HPI.  Constitutional and vital signs reviewed.      All other systems reviewed and negative except as noted above.                  Physical Exam     ED Triage Vitals [05/09/25 0841]   /74   Pulse 72   Resp 12   Temp 97.8 °F (36.6 °C)   Temp src Oral   SpO2 99 %   O2 Device None (Room air)       Current Vitals:   Vital Signs  BP: 134/74  Pulse: 72  Resp: 12  Temp: 97.8 °F (36.6 °C)  Temp src: Oral    Oxygen Therapy  SpO2: 99 %  O2 Device: None (Room air)        Physical Exam  Vitals reviewed.   Constitutional:       General: She is not in acute distress.  Cardiovascular:      Rate and Rhythm: Normal rate and regular rhythm.   Pulmonary:      Effort: Pulmonary effort is normal.      Breath sounds: Normal breath sounds.   Skin:     Findings: Erythema and rash present.      Comments: Widespread facial erythema.  There are some scattered raised lesions but no drainage.  Bilateral upper eyelid swelling.  There is no lip swelling or intraoral swelling.   Neurological:      General: No focal deficit present.      Mental Status: She is alert.   Psychiatric:         Mood and Affect: Mood normal.         Behavior: Behavior normal.           Physical Exam                ED Course   Labs Reviewed - No data to display       Results                           MDM              Medical Decision Making  49-year-old female presents with facial erythema.  Differential diagnosis includes contact dermatitis, cosmetic irritant.  Patient states that she did use a new vitamin C serum on her face.  Shortly after she developed erythema and it has progressed.  There is no respiratory distress.  There is widespread erythema and a raised  rash.  Consistent with an allergic reaction.  Patient was instructed she may use over-the-counter strength hydrocortisone cream on her face 1 time daily.  She was also given a prescription for prednisone.  For care and when to go to the emergency room.  She was also instructed to follow-up with dermatologist if there is no improvement.  She was given a work note.    Risk  OTC drugs.  Prescription drug management.        Disposition and Plan     Clinical Impression:  1. Irritant contact dermatitis due to cosmetics         Disposition:  Discharge  5/9/2025  9:04 am    Follow-up:  Masood Yu MD  [address] [place] [id number]  [phone]      If symptoms worsen          Medications Prescribed:  Discharge Medication List as of 5/9/2025  9:04 AM        START taking these medications    Details   predniSONE 20 MG Oral Tab Take 2 tablets (40 mg total) by mouth daily for 7 days., Normal, Disp-14 tablet, R-0             Supplementary Documentation:

## 2025-05-22 DIAGNOSIS — G43.001 MIGRAINE WITHOUT AURA AND WITH STATUS MIGRAINOSUS, NOT INTRACTABLE: ICD-10-CM

## 2025-05-23 NOTE — TELEPHONE ENCOUNTER
Requested Prescriptions     Pending Prescriptions Disp Refills    AJOVY 225 MG/1.5ML Subcutaneous Solution Prefilled Syringe [Pharmacy Med Name: AJOVY 225MG/1.5ML PF SYR 1.5ML] 1.5 mL 0     Sig: INJECT 1 PEN UNDER THE SKIN EVERY 28 DAYS       Last OV: 10/22/2024  Next OV:     [place]/;  [id number]-vfrm     Dispensed Written Strength Quantity Refills Days Supply Provider Pharmacy   AJOVY 225 MG/1.5ML SOSY 05/19/2025 10/22/2024 225 Undefined 1.5 mL  28 CadenceMD, DO [facility]   AJOVY 225MG/1.5ML PF SYR 1.5ML 04/14/2025 10/22/2024  1.5 mL  28 Randy, Augustina [employer], DO [facility]...   AJOVY 225 MG/1.5ML SOSY 03/19/2025 10/22/2024 225 Undefined 1.5 mL  28 Randy, Deltagen, DO [facility]...   AJOVY 225MG/1.5ML PF SYR 1.5ML 03/18/2025 10/22/2024  1.5 mL  28 Randy, Augustina Chowdary, DO [facility]...   AJOVY 225MG/1.5ML PF SYR 1.5ML 02/20/2025 10/22/2024  1.5 mL  28 Randy, Augustina [employer], DO [facility]...   AJOVY 225 MG/1.5ML SOSY 02/19/2025 10/22/2024 225 Undefined 1.5 mL  28 e-contratosa [employer], DO [facility]...   AJOVY 225MG/1.5ML PF SYR 1.5ML 01/26/2025 10/22/2024  1.5 mL  28 Randy, Augustina Chowdary, DO [facility]       Last office visit plan;   ASSESSMENT:  The patient is a 49 year old  woman with a history of migraine, depression, GERD, ANJELICA who presents for follow up regarding intractable migraines w/ repeated episodes of status migrainosus.    Gabapentin has not been effective.  Prior medication trials: Topiramate (blurry vision, poor p.o. intake), gabapentin (ineffective), venlafaxine (not effective for migraine); Sumatriptan, Rizatriptan, Almotriptan - all tried, not effective/side effects   -MRI brain 7/16/2021: No acute findings, few white matter signal foci likely related to migraine  -CT brain 2/13/2022 normal     Migraine without aura with status migrainosus   -Preventative: Blair - she prefers to stay on medication for now rather than giving a trial  period off to see where her migraine frequency is - this is reasonable.  Can readdress as she prefers.                  Consideration: Emgality, verapamil  -Abortive: Ubrelvy as needed .  She is also welcome to try over the counters as they may be effective now.                  Future consideration: Nurtec                 Limit <2 times per week to avoid developing medication overuse headaches   -Patient should let my office know if she becomes pregnant or plan to become pregnant so we can adjust/stop medication safely.          Discussed indication, administration, dose, and side effects with patient of any medications personally prescribed. Patient was advised to let my office know if they have any questions or concerns.         Today, I personally spent 10 minutes in this case, including chart review, time spent with patient doing face to face evaluation w/ interview and exam and patient education, counseling, and time was spent in patient education, counseling, and coordination of care as described above.   Issues discussed: Diagnosis and implications on future health, benefits and side effects of present and future medications, test results as well as further testing and medications required.     This note was prepared using Dragon Medical voice recognition dictation software and as a result, errors may occur. When identified, these errors have been corrected. While every attempt is made to correct errors during dictation, discrepancies may still exist     TESFAYE Padilla DO   Staff Physician, Neurology   10/22/2024

## 2025-05-28 RX ORDER — FREMANEZUMAB-VFRM 225 MG/1.5ML
120 INJECTION SUBCUTANEOUS
Qty: 1.5 ML | Refills: 0 | Status: SHIPPED | OUTPATIENT
Start: 2025-05-28

## 2025-06-03 ENCOUNTER — OFFICE VISIT (OUTPATIENT)
Dept: OBGYN CLINIC | Facility: CLINIC | Age: 50
End: 2025-06-03
Payer: MEDICAID

## 2025-06-03 VITALS
SYSTOLIC BLOOD PRESSURE: 121 MMHG | HEART RATE: 76 BPM | DIASTOLIC BLOOD PRESSURE: 81 MMHG | BODY MASS INDEX: 40 KG/M2 | WEIGHT: 197.81 LBS

## 2025-06-03 DIAGNOSIS — Z01.419 ENCOUNTER FOR GYNECOLOGICAL EXAMINATION: Primary | ICD-10-CM

## 2025-06-03 DIAGNOSIS — N95.1 PERIMENOPAUSAL SYMPTOM: ICD-10-CM

## 2025-06-03 DIAGNOSIS — Z12.31 ENCOUNTER FOR SCREENING MAMMOGRAM FOR BREAST CANCER: ICD-10-CM

## 2025-06-03 DIAGNOSIS — Z76.0 MEDICATION REFILL: ICD-10-CM

## 2025-06-03 PROCEDURE — 99396 PREV VISIT EST AGE 40-64: CPT | Performed by: OBSTETRICS & GYNECOLOGY

## 2025-06-03 RX ORDER — VENLAFAXINE HYDROCHLORIDE 75 MG/1
75 CAPSULE, EXTENDED RELEASE ORAL DAILY
Qty: 90 CAPSULE | Refills: 3 | Status: SHIPPED | OUTPATIENT
Start: 2025-06-03

## 2025-06-03 RX ORDER — PROGESTERONE 100 MG/1
100 CAPSULE ORAL NIGHTLY
Qty: 90 CAPSULE | Refills: 3 | Status: SHIPPED | OUTPATIENT
Start: 2025-06-03

## 2025-06-03 RX ORDER — MULTIVIT WITH MINERALS/LUTEIN
1000 TABLET ORAL DAILY
COMMUNITY

## 2025-06-03 RX ORDER — OMEGA-3S/DHA/EPA/FISH OIL/D3 1150-1000
LIQUID (ML) ORAL DAILY
COMMUNITY

## 2025-06-03 RX ORDER — ESTRADIOL 0.5 MG/1
0.5 TABLET ORAL DAILY
Qty: 90 TABLET | Refills: 3 | Status: SHIPPED | OUTPATIENT
Start: 2025-06-03

## 2025-06-03 NOTE — PROGRESS NOTES
The following individual(s) verbally consented to be recorded using ambient AI listening technology and understand that they can each withdraw their consent to this listening technology at any point by asking the clinician to turn off or pause the recording:    Patient name: Alix Ordonez  Additional names:

## 2025-06-04 NOTE — PROGRESS NOTES
Alix Ordonez is a 49 year old female  No LMP recorded (lmp unknown). Patient is postmenopausal. here for annual exam.       History of Present Illness  Alix Ordonez is a 49 year old female who presents for an annual exam.  Last seen 24.    Last pap 2024 normal with negative HPV  Last mammogram 2025    She underwent a tummy tuck in 2024 due to significant weight loss. Post-surgery, she has experienced numbness in the abdominal area, now accompanied by sensations of movement described as 'flutters'. Multiple pregnancy tests have been negative.  Normal BM.    LMP 2024.   Taking Effexor for hot flashes and anxiety and was doing well.   She reports worsening hot flashes over the past three to four weeks, more severe during the day.   Declined STD screen.          OBSTETRICS HISTORY:  OB History    Para Term  AB Living   1 1 1 0 0 1   SAB IAB Ectopic Multiple Live Births   0 0 0 0 1       GYNE HISTORY   Menarche: 13 years old  Pap Date: 24  Pap Result Notes: NEG PAP NEG HPV  Follow Up Recommendation: MAMMO 25 MITCH NEG    MEDICAL HISTORY:  Past Medical History:    Anxiety    Chicken pox    Depression    Extrinsic asthma, unspecified    H/O seasonal allergies    Hyperopia with presbyopia of both eyes    Migraines    ANJELICA on CPAP    Ovarian cyst    Sleep apnea    Varicose vein     Past Surgical History:   Procedure Laterality Date      2013    Cyst aspiration right      Laparoscopy, gastric restrictive procedure, with gastric bypass for morbid  2022    Myomectomy 5/> intramural myomas &/or total wt >250 gms, abdominal approach      Other surgical history  2024    tummy tuck    Skin surgery  2017    R breast sebaceous cyst       SOCIAL HISTORY:  Social History     Socioeconomic History    Marital status:     Number of children: 1   Occupational History    Occupation:     Occupation: [employer]    Tobacco Use     Smoking status: Former     Current packs/day: 0.00     Types: Cigarettes     Quit date: 1993     Years since quittin.4     Passive exposure: Past    Smokeless tobacco: Never   Vaping Use    Vaping status: Never Used   Substance and Sexual Activity    Alcohol use: Yes     Comment: red wine on occ    Drug use: No    Sexual activity: Not Currently     Partners: Male   Other Topics Concern    Caffeine Concern Yes     Comment: 1-2 diet soda daily, 1 cup of coffee daily    Sleep Concern Yes     Comment: wakes several times    Exercise No    Pt has a pacemaker No    Pt has a defibrillator No    Reaction to local anesthetic No   Social History Narrative    The patient does not use an assistive device..      The patient does live in a home with stairs.     Social Drivers of Health     Food Insecurity: No Food Insecurity (6/3/2025)    NCSS - Food Insecurity     Worried About Running Out of Food in the Last Year: No     Ran Out of Food in the Last Year: No   Transportation Needs: No Transportation Needs (6/3/2025)    NCSS - Transportation     Lack of Transportation: No   Housing Stability: Not At Risk (6/3/2025)    NCSS - Housing/Utilities     Has Housing: Yes     Worried About Losing Housing: No     Unable to Get Utilities: No       FAMILY HISTORY:  Family History   Problem Relation Age of Onset    Psychiatric Mother     Cataracts Mother     Hypertension Father     Lipids Father         HYPERLIPIDEMIA    Diabetes Father     Cancer Paternal Aunt         lung    Kidney Cancer Maternal Uncle     Macular degeneration Neg     Glaucoma Neg     Retinal detachment Neg     Breast Cancer Neg     Ovarian Cancer Neg     Prostate Cancer Neg     Pancreatic Cancer Neg     Colon Cancer Neg     Uterine Cancer Neg        MEDICATIONS:  Current Outpatient Medications   Medication Sig Dispense Refill    Turmeric (QC TUMERIC COMPLEX OR) Take by mouth.      omega-3 Oral Liquid Take by mouth daily.      vitamin E 1000 UNITS Oral Cap Take 1  capsule (1,000 Units total) by mouth daily.      Coenzyme Q10 (SM COQ-10 OR) Take by mouth.      venlafaxine ER 75 MG Oral Capsule SR 24 Hr Take 1 capsule (75 mg total) by mouth daily. 90 capsule 3    estradiol (ESTRACE) 0.5 MG Oral Tab Take 1 tablet (0.5 mg total) by mouth daily. 90 tablet 3    progesterone (PROMETRIUM) 100 MG Oral Cap Take 1 capsule (100 mg total) by mouth nightly. 90 capsule 3    AJOVY 225 MG/1.5ML Subcutaneous Solution Prefilled Syringe INJECT 1 PEN UNDER THE SKIN EVERY 28 DAYS 1.5 mL 0    ubrogepant (UBRELVY) 100 MG Oral Tab Take one tablet at onset of migraine.  May take additional tablet in 2 hours if needed.  Do not exceed two tablets per 24 hour period. 10 tablet 11    COLLAGEN OR Take by mouth.      Multiple Vitamins-Minerals (MULTI-VITAMIN/MINERALS) Oral Tab Take 1 tablet by mouth daily.         ALLERGIES:    Allergies   Allergen Reactions    Sulfa Antibiotics RASH         Depression Screening (PHQ-2/PHQ-9): Over the LAST 2 WEEKS   Little interest or pleasure in doing things (over the last two weeks)?: Several days    Feeling down, depressed, or hopeless (over the last two weeks)?: Several days    PHQ-2 SCORE: 2           Review of Systems:  Constitutional:  Denies fatigue, night sweats, hot flashes  Eyes:  denies blurred or double vision  Cardiovascular:  denies chest pain or palpitations  Respiratory:  denies shortness of breath  Gastrointestinal:  denies heartburn, abdominal pain, diarrhea or constipation  Genitourinary:  denies dysuria, incontinence, abnormal vaginal discharge, vaginal itching  Musculoskeletal:  denies back pain.  Skin/Breast:  Denies any breast pain, lumps, or discharge.   Neurological:  denies headaches, extremity weakness or numbness.  Psychiatric: denies depression or anxiety.  Endocrine:   denies excessive thirst or urination.  Heme/Lymph:  easy bruising or bleeding.    PHYSICAL EXAM:   /81   Pulse 76   Wt 197 lb 12.8 oz (89.7 kg)   LMP  (LMP Unknown)    BMI 39.95 kg/m²   Wt Readings from Last 2 Encounters:   06/03/25 197 lb 12.8 oz (89.7 kg)   10/14/24 215 lb (97.5 kg)     Body mass index is 39.95 kg/m².    Constitutional: well developed, well nourished  Neck/Thyroid: thyroid symmetric, no nodules  Heart:  Regular rate and rhythm  Lungs:  Clear to asculation  Breast: normal without palpable masses, tenderness, asymmetry, nipple discharge, nipple retraction or skin changes  Abdomen:  soft, nontender, nondistended, no masses  Psychiatric:  Oriented to time, place, person and situation. Appropriate mood and affect    Pelvic Exam:  External Genitalia: normal appearance, hair distribution, and no lesions  Urethral Meatus:  normal in size, location, without lesions and prolapse  Bladder:  No fullness, masses or tenderness  Vagina:  Normal appearance without lesions, no abnormal discharge  Cervix:  Normal without tenderness on motion  Uterus: normal in size, contour, position, mobility, without tenderness  Adnexa: normal without masses or tenderness  Perineum/anus: normal      Assessment & Plan:    Alix Ordonez is a 49 year old female who presents for an annual physical exam.    1. Medication refill  - venlafaxine ER 75 MG Oral Capsule SR 24 Hr; Take 1 capsule (75 mg total) by mouth daily.  Dispense: 90 capsule; Refill: 3    2. Encounter for gynecological examination  Pap not done.  ASCCP guidelines reviewed.   Encouraged annual exam.   Order for mammogram to be done 2/206.   RTC 1 year or prn     3. Encounter for screening mammogram for breast cance  - [facility] JOHNNIE 2D+3D SCREENING BILAT (CPT=77067/96567); Future    4. Perimenopausal symptom  Wants to add HRT.   Rx Estrace 0.5mg and Prometrium 100 mg.           Requested Prescriptions     Signed Prescriptions Disp Refills    venlafaxine ER 75 MG Oral Capsule SR 24 Hr 90 capsule 3     Sig: Take 1 capsule (75 mg total) by mouth daily.    estradiol (ESTRACE) 0.5 MG Oral Tab 90 tablet 3     Sig: Take 1 tablet (0.5 mg total)  by mouth daily.    progesterone (PROMETRIUM) 100 MG Oral Cap 90 capsule 3     Sig: Take 1 capsule (100 mg total) by mouth nightly.         Agnieszka Hensley MD  6/4/2025  5:52 PM

## 2025-07-31 ENCOUNTER — TELEPHONE (OUTPATIENT)
Dept: NEUROLOGY | Facility: CLINIC | Age: 50
End: 2025-07-31

## 2025-08-04 ENCOUNTER — TELEPHONE (OUTPATIENT)
Dept: NEUROLOGY | Facility: CLINIC | Age: 50
End: 2025-08-04

## 2025-08-04 DIAGNOSIS — G43.001 MIGRAINE WITHOUT AURA AND WITH STATUS MIGRAINOSUS, NOT INTRACTABLE: ICD-10-CM

## 2025-08-05 RX ORDER — UBROGEPANT 100 MG/1
TABLET ORAL
Qty: 10 TABLET | Refills: 5 | Status: SHIPPED | OUTPATIENT
Start: 2025-08-05 | End: 2026-08-05

## 2025-08-21 ENCOUNTER — PATIENT MESSAGE (OUTPATIENT)
Dept: OBGYN CLINIC | Facility: CLINIC | Age: 50
End: 2025-08-21

## 2025-08-28 ENCOUNTER — OFFICE VISIT (OUTPATIENT)
Dept: PODIATRY CLINIC | Facility: CLINIC | Age: 50
End: 2025-08-28

## 2025-08-28 DIAGNOSIS — M76.822 POSTERIOR TIBIAL TENDINITIS OF LEFT LEG: ICD-10-CM

## 2025-08-28 DIAGNOSIS — M72.2 PLANTAR FASCIITIS, LEFT: Primary | ICD-10-CM

## 2025-08-28 PROCEDURE — 99214 OFFICE O/P EST MOD 30 MIN: CPT | Performed by: STUDENT IN AN ORGANIZED HEALTH CARE EDUCATION/TRAINING PROGRAM

## 2025-08-28 RX ORDER — METHYLPREDNISOLONE 4 MG/1
TABLET ORAL
Qty: 21 TABLET | Refills: 0 | Status: SHIPPED | OUTPATIENT
Start: 2025-08-28

## (undated) DIAGNOSIS — Z01.818 PREOP TESTING: ICD-10-CM

## (undated) DIAGNOSIS — R11.10 REGURGITATION OF FOOD: Primary | ICD-10-CM

## (undated) DEVICE — IRRIGATOR SCT STRKFL 2 STRL LF DISP

## (undated) DEVICE — GOWN SURG XL L3 NONREINFORCE SET IN SLV STRL LF DISP BLUE

## (undated) DEVICE — STERILE LATEX POWDER-FREE SURGICAL GLOVESWITH NITRILE COATING: Brand: PROTEXIS

## (undated) DEVICE — CHLORAPREP 26ML APPLICATOR

## (undated) DEVICE — DRAPE ARM 21X19X10.5IN EQUIPMENT DA VINCI XI 21LB

## (undated) DEVICE — DRAPE SHEET TRANSVERSE LAP

## (undated) DEVICE — BRAVO SOLUTION PH 1.07

## (undated) DEVICE — DRAPE .75 SHT FNFLD 76X52IN SURG CNVRT STRL LF DISP TIBURON

## (undated) DEVICE — MEDI-VAC NON-CONDUCTIVE SUCTION TUBING 6MM X 1.8M (6FT.) L: Brand: CARDINAL HEALTH

## (undated) DEVICE — VALVE BTN DEFENDO AIRH20 KIT SCT STRL DISP BIOPSY

## (undated) DEVICE — KIT CLEAN ENDOKIT 1.1OZ GOWNX2

## (undated) DEVICE — SYSTEM IMG CLEARIFY 8X6IN WARM HUB TROCAR WIPE MRFBR DISP

## (undated) DEVICE — DEVICE V-LOC 90 6IN 3-0 5-20 NABSB BLUE PLYBTSTR CLSR

## (undated) DEVICE — SUTURE VICRYL 2-0 SH 27IN BRAID COAT ABS UNDYED J417H

## (undated) DEVICE — DEVICE V-LOC 180 6IN 3-0 CV-23 ABS GRN CLSR

## (undated) DEVICE — ELECTRODE PT RTN C30- LB 9FT CORD NONIRRITATE NONSENSITIZE

## (undated) DEVICE — CAPSULE BRAVO PH

## (undated) DEVICE — SYRINGE 50ML ECNTRC TIP STRL MED LF DISP BD

## (undated) DEVICE — NEEDLE INSFL 120MM EPTH PNMPRTN

## (undated) DEVICE — SEAL CANNULA 12MM EWRST STPLR DA VINCI XI

## (undated) DEVICE — GLOVE SURG 7.5 DRMPRN ULTRA SRFT TECHNOLOGY LF DARK GRN PF

## (undated) DEVICE — DERMABOND LIQUID ADHESIVE

## (undated) DEVICE — GLOVE SURG 7.5 PROTEXIS LF BLUE PF SMTH BEAD CUFF INTLK STRL

## (undated) DEVICE — BULB 100CC SIL DRN LTWT LOW LVL SCT WND DRN STRL LF DISP

## (undated) DEVICE — DRAPE CLMN EQUIPMENT DA VINCI XI

## (undated) DEVICE — NEEDLE HPO 18GA 1.5IN REG WALL REG BVL LL HUB CLR CD DEHP-FR

## (undated) DEVICE — EVACUATOR SMOKE ELIMINATION PSHBTN QUIET PNEUVIEW XE STRL

## (undated) DEVICE — FORCEP RADIAL JAW 4

## (undated) DEVICE — SOL  .9 1000ML BTL

## (undated) DEVICE — DEVICE V-LOC 18IN 0 GS-22 NABSB BLUE CLSR

## (undated) DEVICE — E-Z CLEAN, NON-STICK, PTFE COATED, ELECTROSURGICAL BLADE ELECTRODE, MODIFIED EXTENDED INSULATION, 2.5 INCH (6.35 CM): Brand: MEGADYNE

## (undated) DEVICE — DISSECTOR LAPSCP 39CM SONICISION CRV JAW US CRDLS

## (undated) DEVICE — Device

## (undated) DEVICE — KIT ENDO ORCAPOD 160/180/190

## (undated) DEVICE — SUTURE VICRYL MTPS 4-0 PS2 27IN BRAID COAT ABS UNDYED J426H

## (undated) DEVICE — STAPLER INTERNAL ANVIL CENTRIC CANNULA STPL SUREFORM 60 DA

## (undated) DEVICE — SUTURE VICRYL 3-0 SH

## (undated) DEVICE — NEEDLE INSFL 14GA 120MM STD SPRG LOAD BLUNT STY STRL LF DISP

## (undated) DEVICE — ADHESIVE PREMIERPRO EXOFIN 1ML HVISC TUBE SOFT FLXB APL

## (undated) DEVICE — CONMED SCOPE SAVER BITE BLOCK, 20X27 MM: Brand: SCOPE SAVER

## (undated) DEVICE — 2% CHLORHEXIDINE SKIN PREP ORANGE 26ML

## (undated) DEVICE — COVER FLXB STRL LF MDCHC LIGHT HNDL

## (undated) DEVICE — GLOVE SURG 7.5 PROTEXIS LF CRM PF SMTH BEAD CUFF STRL

## (undated) DEVICE — Device: Brand: CUSTOM PROCEDURE KIT

## (undated) DEVICE — SEALER TISS DA VINCI SLIM JAW XTD DISP

## (undated) DEVICE — GLOVE SURG 7.5 PREMIERPRO LF NATURAL PF TXTR SMTH STRL

## (undated) DEVICE — KIT ENDO CLN 200ML 1STP KRINKLE SIMPLE2 LF

## (undated) DEVICE — OBTURATOR LAPSCP 8MM WECK VISTA DISP BLDLS STRL LF

## (undated) DEVICE — BRAVO SOLUTION PH 7.01

## (undated) DEVICE — SUTURE ETHIBOND EXCEL 2-0 SH 36IN 2 ARM BRAID NABSB X523H

## (undated) DEVICE — UNDYED BRAIDED (POLYGLACTIN 910), SYNTHETIC ABSORBABLE SUTURE: Brand: COATED VICRYL

## (undated) DEVICE — GLOVE SURG 7 PREMIERPRO LF NATURAL PF TXTR SMTH STRL

## (undated) DEVICE — MINOR GENERAL: Brand: MEDLINE INDUSTRIES, INC.

## (undated) DEVICE — TUBING INSFL PNEUMOCLEAR SET HFL

## (undated) DEVICE — SYRINGE 50ML GRAD N-PYRG DEHP-FR PVC FREE STRL MED LF DISP

## (undated) DEVICE — DRAIN INCS 19FR 316IN .75 FLUTE RND .25IN BARD SIL 4 FREE

## (undated) DEVICE — LINE MNTR ADLT SET O2 INTMD

## (undated) DEVICE — SEAL ENDO INST 5-8MM DA VINCI XI UNV

## (undated) DEVICE — REDUCER LAPSCP 8-12MM DA VINCI XI EWRST CANNULA

## (undated) DEVICE — BINDER ABD LG 12IN 63-74IN MULTIPANEL HOOK LOOP CLSR NS LF

## (undated) DEVICE — SUTURE ETHILON MTPS 2-0 PS 18IN MONO NABSB BLK 585H

## (undated) NOTE — LETTER
AUTHORIZATION FOR SURGICAL OPERATION OR OTHER PROCEDURE    1. I hereby authorize Dr. Yury Ortega and the [facility] staff assigned to my case to perform the following operation and/or procedure at the [facility]:    Bilateral subacromial CSI    2.   My physician Parent    Responsible person                          []  Spouse  In case of minor or                    [] Other  _____________   Incompetent name:  __________________________________________________                               (please print)      _____

## (undated) NOTE — LETTER
AUTHORIZATION FOR SURGICAL OPERATION OR OTHER PROCEDURE    1. I hereby authorize Dr. Tracy Roberson  and [facility] staff assigned to my case to perform the following operation and/or procedure at the [facility]:    Cortisone injection in the Left heel   _______________________________________________________________________________________________      _______________________________________________________________________________________________    2. My physician has explained the nature and purpose of the operation or other procedure, possible alternative methods of treatment, the risks involved, and the possibility of complication to me. I acknowledge that no guarantee has been made as to the result that may be obtained. 3.  I recognize that, during the course of this operation, or other procedure, unforseen conditions may necessitate additional or different procedure than those listed above. I, therefore, further authorize and request that the above named physician, his/her physician assistants or designees perform such procedures as are, in his/her professional opinion, necessary and desirable. 4.  Any tissue or organs removed in the operation or other procedure may be disposed of by and at the discretion of the [facility] and [facility]. 5.  I understand that in the event of a medical emergency, I will be transported by local paramedics to [facility] or other hospital emergency department. 6.  I certify that I have read and fully understand the above consent to operation and/or other procedure. 7.  I acknowledge that my physician has explained sedation/analgesia administration to me including the risks and benefits. I consent to the administration of sedation/analgesia as may be necessary or desirable in the judgement of my physician.     Witness signature: ___________________________________________________ Date: ______/______/_____                    Time:  ________ A. M.  P.M. Patient Name:  ______________________________________________________  (please print)      Patient signature:  ___________________________________________________             Relationship to Patient:           []  Parent    Responsible person                          []  Spouse  In case of minor or                    [] Other  _____________   Incompetent name:  __________________________________________________                               (please print)      _____________      Responsible person  In case of minor or  Incompetent signature:  _______________________________________________    Statement of Physician  My signature below affirms that prior to the time of the procedure, I have explained to the patient and/or his/her guardian, the risks and benefits involved in the proposed treatment and any reasonable alternative to the proposed treatment. I have also explained the risks and benefits involved in the refusal of the proposed treatment and have answered the patient's questions.                         Date:  ______/______/_______  Provider                      Signature:  __________________________________________________________       Time:  ___________ A.M    P.M.

## (undated) NOTE — LETTER
Dear Dr. Gutierrez Me    This letter is to inform you that Scott Lisa has been attending Physical Therapy with me. See below for my most recent plan of care.        Patient Name: Scott Lisa, : 1975, MRN: [id number]   Date:   care.    Thank you for your referral. If you have any questions, please contact me at Dept: [phone].     Sincerely,  Barrett Bhandari PT, DPT, cert MDT      Electronically signed by therapist: Barrett Bhandari PT

## (undated) NOTE — LETTER
Date & Time: 5/9/2025, 9:04 AM  Patient: Alix Ordonez  Encounter Provider(s):    Lila Arceo APRN       To Whom It May Concern:    Alix Ordonez was seen and treated in our department on 5/9/2025. Please excuse her from work today May 9, 2025.    If you have any questions or concerns, please do not hesitate to call.      Lila Arceo NP  _____________________________  Physician/APC Signature

## (undated) NOTE — MR AVS SNAPSHOT
[address]  [phone]               Thank you for choosing us for your health care visit with Carmenza Blake MD.  We are glad to serve you and happy to provide you with this summary * Ketoconazole 2 % Sham   2-3 x weekly as directed   Commonly known as:  NIZORAL           MULTI-VITAMIN/MINERALS Tabs   Take 1 tablet by mouth daily. Vitamin D3 2000 UNITS Tabs   Take by mouth daily. * Notice:   This list has 2 medicat Referral Order Referred to  Symone Paredes Phone Visits Status Diagnosis    PHYSICAL THERAPY - INTERNAL Verla Rist     1 Open [2] Chondromalacia, patella, right [[id number]]      MyChart     Call the boomtraink for assistance with your inactive [employer] account    If Visit [facility] online at  [email or web address]

## (undated) NOTE — LETTER
AUTHORIZATION FOR SURGICAL OPERATION OR OTHER PROCEDURE    1. I hereby authorize Dr. Ej Clemens, Vick Hawkins, and [facility] staff assigned to my case to perform the following operation and/or procedure at the [facility]:    _______________________________________________________________________________________________    Cortisone Injection to Bilateral knees  _______________________________________________________________________________________________    2. My physician has explained the nature and purpose of the operation or other procedure, possible alternative methods of treatment, the risks involved, and the possibility of complication to me. I acknowledge that no guarantee has been made as to the result that may be obtained. 3.  I recognize that, during the course of this operation, or other procedure, unforseen conditions may necessitate additional or different procedure than those listed above. I, therefore, further authorize and request that the above named physician, his/her physician assistants or designees perform such procedures as are, in his/her professional opinion, necessary and desirable. 4.  Any tissue or organs removed in the operation or other procedure may be disposed of by and at the discretion of the [facility] and [facility]. 5.  I understand that in the event of a medical emergency, I will be transported by local paramedics to [facility] or other hospital emergency department. 6.  I certify that I have read and fully understand the above consent to operation and/or other procedure. 7.  I acknowledge that my physician has explained sedation/analgesia administration to me including the risks and benefits. I consent to the administration of sedation/analgesia as may be necessary or desirable in the judgement of my physician.     Witness signature: ___________________________________________________ Date: ______/______/_____                    Time:  ________ A. M.  P.M. Patient Name:  ______________________________________________________  (please print)      Patient signature:  ___________________________________________________             Relationship to Patient:           []  Parent    Responsible person                          []  Spouse  In case of minor or                    [] Other  _____________   Incompetent name:  __________________________________________________                               (please print)      _____________      Responsible person  In case of minor or  Incompetent signature:  _______________________________________________    Statement of Physician  My signature below affirms that prior to the time of the procedure, I have explained to the patient and/or his/her guardian, the risks and benefits involved in the proposed treatment and any reasonable alternative to the proposed treatment. I have also explained the risks and benefits involved in the refusal of the proposed treatment and have answered the patient's questions.                         Date:  ______/______/_______  Provider                      Signature:  __________________________________________________________       Time:  ___________ A.M    P.M.

## (undated) NOTE — LETTER
Sammy Rojas, [address] [facility] [place]       07/25/17        Patient: Navdeep Garcia   YOB: 1975   Date of Visit: 7/25/2017       Dear  Bridget Trinh :           As you know, Bibi Sauer is a 70-year-old female who I am now alcohol, [employer]    FAMILY HISTORY: Mother with arthropathy, father with hypertension and diabetes    ALLERGIES TO MEDICATIONS: Sulfa    MEDICATIONS: Vitamin D calcium rizatriptan ketoconazole Zoloft hydrochlorothiazide phentermine    REVIEW OF SYSTE 9.  Set aside worry time to write out her concerns about 5:00 or 6:00 in the evening to make a day plan for the next day so that she keeps all psychological baggage out of the bedroom  10. Keep the bedroom for sleep or sex  6.   If the patient cannot fall

## (undated) NOTE — MR AVS SNAPSHOT
Daniel Horowitz [address] [facility] [address] [address]  [phone]  [phone]               Thank you for choosing us for your health care visit with Josefina Carroll PT.   We are glad to serve you and happy to provide you with Call your insurance to verify coverage for this visit and bring a 24 hour food history log and list of your medications. Access [email or web address] for additional information. \"              MyChart     Call the helpdesk for assistance with your i

## (undated) NOTE — MR AVS SNAPSHOT
Daniel Horowitz [address] [facility] [address] [address]  [phone]  [phone]               Thank you for choosing us for your health care visit with Jenise Sotomayor PT.   We are glad to serve you and happy to provide you with [address] [facility] ([address] [place] [address])    [address] [address]   [phone]           Please arrive at your scheduled appointment time. Wear comfortable, loose fitting clothing.             Mar 23, 201

## (undated) NOTE — LETTER
May 16, 2018    Zhang Arias MD   [address] [address]     Patient: Nicanor Chatman   YOB: 1975   Date of Visit: 5/16/2018       Dear Dr. Karen Abreu MD:    Thank you for referring Ronan Amanda to me for evaluation.  H Alcohol use: Yes           0.0 oz/week     Comment: RARELY      Medications:    Current Outpatient Prescriptions:  Sertraline HCl 100 MG Oral Tab Take 1 tablet (100 mg total) by mouth daily.  Disp: 90 tablet Rfl: 1   Phentermine HCl 37.5 MG Oral Tab Take 1 Slit Lamp and Fundus Exam     Slit Lamp Exam       Right Left    Lids/Lashes Normal Normal    Conjunctiva/Sclera Normal Normal    Cornea Clear, no fluorescein stain, MDF superiorly  Clear, no fluorescein stain, MDF superiorly    Anterior Chamber Deep and q

## (undated) NOTE — MR AVS SNAPSHOT
[facility] [place] [address] [place]  [id number] [phone]               Thank you for choosing us for your health care visit with Julianne Shields MD.  We are glad to serve you and happy to provide you with this summary of Current Medications          This list is accurate as of: 2/21/17  1:26 PM.  Always use your most recent med list.                CALCIUM 1200 OR   Take by mouth daily.            Cefadroxil 500 MG Caps   Take 1 capsule (500 mg total) by mouth 2 (two) times If you have questions, you can call [phone] to talk to our [facility] Staff. Remember, [employer] is NOT to be used for urgent needs. For medical emergencies, dial 911. Visit [email or web address] to learn more.            Visit IAN

## (undated) NOTE — LETTER
August 4, 2017         Teri Smith MD  [address] [facility]      Patient: Scott Lisa   YOB: 1975   Date of Visit: 7/25/2017           Dear  Taurus Adam :           As you know, Eleanor Loza is a 17-year-old female who alcohol, hint    FAMILY HISTORY: Mother with arthropathy, father with hypertension and diabetes    ALLERGIES TO MEDICATIONS: Sulfa    MEDICATIONS: Vitamin D calcium rizatriptan ketoconazole Zoloft hydrochlorothiazide phentermine    REVIEW OF SYSTE 9.  Set aside worry time to write out her concerns about 5:00 or 6:00 in the evening to make a day plan for the next day so that she keeps all psychological baggage out of the bedroom  10. Keep the bedroom for sleep or sex  6.   If the patient cannot fall

## (undated) NOTE — LETTER
AUTHORIZATION FOR SURGICAL OPERATION OR OTHER PROCEDURE    1. I hereby authorize Dr. Smith, and [facility] staff assigned to my case to perform the following operation and/or procedure at the [facility]:    _______________________________________________________________________________________________    Right knee Durolane injection  _______________________________________________________________________________________________    2.  My physician has explained the nature and purpose of the operation or other procedure, possible alternative methods of treatment, the risks involved, and the possibility of complication to me.  I acknowledge that no guarantee has been made as to the result that may be obtained.  3.  I recognize that, during the course of this operation, or other procedure, unforseen conditions may necessitate additional or different procedure than those listed above.  I, therefore, further authorize and request that the above named physician, his/her physician assistants or designees perform such procedures as are, in his/her professional opinion, necessary and desirable.  4.  Any tissue or organs removed in the operation or other procedure may be disposed of by and at the discretion of the [facility] and [facility].  5.  I understand that in the event of a medical emergency, I will be transported by local paramedics to [facility] or other hospital emergency department.  6.  I certify that I have read and fully understand the above consent to operation and/or other procedure.    7.  I acknowledge that my physician has explained sedation/analgesia administration to me including the risks and benefits.  I consent to the administration of sedation/analgesia as may be necessary or desirable in the judgement of my physician.    Witness signature: ___________________________________________________ Date:  ______/______/_____                    Time:   ________ A.M.  P.M.       Patient Name:  ______________________________________________________  (please print)      Patient signature:  ___________________________________________________             Relationship to Patient:           []  Parent    Responsible person                          []  Spouse  In case of minor or                    [] Other  _____________   Incompetent name:  __________________________________________________                               (please print)      _____________      Responsible person  In case of minor or  Incompetent signature:  _______________________________________________    Statement of Physician  My signature below affirms that prior to the time of the procedure, I have explained to the patient and/or his/her guardian, the risks and benefits involved in the proposed treatment and any reasonable alternative to the proposed treatment.  I have also explained the risks and benefits involved in the refusal of the proposed treatment and have answered the patient's questions.                        Date:  ______/______/_______  Provider                      Signature:  __________________________________________________________       Time:  ___________ A.M    P.M.

## (undated) NOTE — MR AVS SNAPSHOT
Daniel Horowitz 12 [facility] [address] [address]  [phone]  [phone]               Thank you for choosing us for your health care visit with Giovany Recinos PT.   We are glad to serve you and happy to provide you with

## (undated) NOTE — MR AVS SNAPSHOT
[facility] [address] [facility] [address]  [phone]  [phone]               Thank you for choosing us for your health care visit with Shelby Zapien PTA.   We are glad to serve you and happy to provide you with Call your insurance to verify coverage for this visit and bring a 24 hour food history log and list of your medications. Access [email or web address]://[email or web address]. [employer]. org for additional information. \"            Mar 20, 2017 12:00 PM   [facility] Visit By

## (undated) NOTE — MR AVS SNAPSHOT
Daniel Horowitz 12 [facility] 43 [address]  [phone]  [phone]               Thank you for choosing us for your health care visit with Martita Hinojosa PT.   We are glad to serve you and happy to provide you with Support Staff. Remember, The [employer] website is NOT to be used for urgent needs. For medical emergencies, dial 911. Visit [email or web address]. org to learn more.

## (undated) NOTE — LETTER
Patient Name: Jina Pires  YOB: 1975          MRN number:  [id number]  Date:  12/1/2023  Referring Physician:  Gena Arevalo               [address] [place] [address] Cures Act Notice to Patient: Medical documents like this are made available to patients in the interest of transparency. However, be advised this is a medical document and it is intended as gaak-hw-qcsb communication between your medical providers. This medical document may contain abbreviations, assessments, medical data, and results or other terms that are unfamiliar. Medical documents are intended to carry relevant information, facts as evident, and the clinical opinion of the practitioner. As such, this medical document may be written in language that appears blunt or direct. You are encouraged to contact your medical provider and/or [facility] Patient Experience if you have any questions about this medical document.

## (undated) NOTE — LETTER
Date & Time: 6/23/2024, 12:43 PM  Patient: Alix Ordonez  Encounter Provider(s):    Lorna Singletary APRN       To Whom It May Concern:    Alix Ordonez was seen and treated in our department on 6/23/2024. She should not return to work until Thursday June 27th 2024 .    If you have any questions or concerns, please do not hesitate to call.        _____________________________  Physician/APC Signature

## (undated) NOTE — LETTER
No referring provider defined for this encounter. 10/17/17        Patient: Glenn Mckinney   YOB: 1975   Date of Visit: 10/17/2017       Dear  Dr. Daniele Logan MD,      Thank you for referring Glenn Mckinney to my practice.   Please

## (undated) NOTE — MR AVS SNAPSHOT
Daniel Horowitz 12 [facility] 43 [address]  [phone]  [phone]               Thank you for choosing us for your health care visit with Derrick Bishop PT.   We are glad to serve you and happy to provide you with Call your insurance to verify coverage for this visit and bring a 24 hour food history log and list of your medications. Access [email or web address]. [facility]. org for additional information. \"              MyChart     Call the helpdesk for assistance with your i

## (undated) NOTE — MR AVS SNAPSHOT
[facility] [employer] [address]                Thank you for choosing us for your health care visit with Ashwini Marina MD.  We are glad to serve you and happy to provide you with this summary of your v Z68.43], ANJELICA on CPAP [G47.33, Z99.89], Binge eating [R63.2]                 Follow-up Instructions     Return in about 4 weeks (around 7/13/2017).          Reason for Today's Visit     Consult     Weight Management           Medical Issues Discussed Today * Notice: This list has 2 medication(s) that are the same as other medications prescribed for you. Read the directions carefully, and ask your doctor or other care provider to review them with you.          Where to Get Your Medications      These medicat Visit [facility] online at  [email or web address].tn

## (undated) NOTE — Clinical Note
6/15/2017    Patient: Selvin Brantley  : 1975 Visit date: 6/15/2017    Dear  Dr. Nathan Dodson MD,    Quin Etienne is a pleasant but unfortunate 39year old,, female, who was referred to us for weight management recommendations.   Quin Jaimie is int

## (undated) NOTE — LETTER
AUTHORIZATION FOR SURGICAL OPERATION OR OTHER PROCEDURE    1.  I hereby authorize Dr. Nessa Katz, and [address] [place] [address] staff assigned to my case to perform the following operation and/or procedure at the [address] [place] [address]:    ________________________________ Time:  ________ A. M.  P.M.        Patient Name:  ______________________________________________________  (please print)      Patient signature:  ___________________________________________________             Relationship to Patient:           []  Parent

## (undated) NOTE — LETTER
Date & Time: 11/3/2023, 9:46 AM  Patient: Catalina Gracia  Encounter Provider(s):    EDISON Villalba       To Whom It May Concern:    Ciera Sanchez was seen and treated in our department on 11/3/2023. She cannot return to work until Monday, November 6, 2023.   If you have any questions or concerns, please do not hesitate to call.        _____________________________  Physician/APC Signature

## (undated) NOTE — MR AVS SNAPSHOT
After Visit Summary   2/29/2024    Alix Ordonez   MRN: [id number]           Visit Information     Date & Time  2/29/2024  9:20 AM Provider  Agnieszka Hensley MD [facility] OB/GYN Dept. Phone  [phone]      Your Vitals Were  Most recent update: 2/29/2024  9:33 AM    BP   120/81    Pulse   75    Wt   218 lb 9.6 oz    LMP   11/29/2023 (Approximate)    BMI   44.15 kg/m²         Allergies as of 2/29/2024  Review status set to Review Complete on 2/29/2024       Noted Reaction Type Reactions    Sulfa Antibiotics 09/29/2014    RASH      Your Current Medications        Dosage    chlorhexidine gluconate 0.12 % Mouth/Throat Solution USE TWICE DAILY AFTER BRUSHING    venlafaxine ER 75 MG Oral Capsule SR 24 Hr Take 1 capsule (75 mg total) by mouth daily.    Fremanezumab-vfrm (AJOVY) 225 MG/1.5ML Subcutaneous Solution Prefilled Syringe Inject 1.5 mL into the skin every 30 (thirty) days.    ubrogepant (UBRELVY) 100 MG Oral Tab Take one tablet at onset of migraine.  May take additional tablet in 2 hours if needed.  Do not exceed two tablets per 24 hour period.    COLLAGEN OR Take by mouth.    Multiple Vitamins-Minerals (MULTI-VITAMIN/MINERALS) Oral Tab Take 1 tablet by mouth daily.      Diagnoses for This Visit    Encounter for gynecological examination   [7700033]  -  Primary  Encounter for screening mammogram for breast cancer   [7772208]    Screening for cervical cancer   [[id number]]             We Ordered the Following     Normal Orders This Visit    Hpv Dna  High Risk , Thin Prep Collect [[id number] CUSTOM]     THINPREP PAP SMEAR ONLY [WGS5681 CUSTOM]     ThinPrep PAP Smear [[id number] CUSTOM]     Future Labs/Procedures Expected by Expires    Hpv Dna  High Risk , Thin Prep Collect [[id number] CUSTOM]  2/29/2024 2/28/2025    ThinPrep PAP Smear [[id number] CUSTOM]  2/29/2024 2/28/2025    [facility] JOHNNIE 2D+3D SCREENING BILAT (CPT=77067/05314) [COMBO CPT(R)]  1/24/2025 (Approximate)  2/28/2025      Future Appointments        Provider Department    5/6/2024 11:15 AM TAN MAE [facility], [facility], [place]      Imaging Scheduling Instructions     Around January 24, 2025   Imaging:   FLAVIO JOHNNIE 2D+3D SCREENING BILAT (CPT=77067/07882)    Instructions: Your order will generate a \"Scheduling Ticket\" that will be available in [employer] to schedule on your own at a time most convenient to you.      If you do not have a [employer] Account, or if you prefer to speak with someone to schedule your appointment, please call [facility] Scheduling at [phone].                  Did you know that [facility] primary care physicians now offer Video Visits through [employer] for adult patients for a variety of conditions such as allergies, back pain and cold symptoms? Skip the drive and waiting room and online chat with a doctor face-to-face using your web-cam enabled computer or mobile device wherever you are. Video Visits cost $50 and can be paid hassle-free using a credit, debit, or health savings card.  Not active on [employer]? Ask us how to get signed up today!          If you receive a survey from [employer], please take a few minutes to complete it and provide feedback. We strive to deliver the best patient experience and are looking for ways to make improvements. Your feedback will help us do so. For more information on [employer], please visit [email or web address]           No text in SmartText           No text in SmartText

## (undated) NOTE — MR AVS SNAPSHOT
Daniel Horowitz 12 [facility] [address]  [phone]  [phone]               Thank you for choosing us for your health care visit with Giovany Recinos PT.   We are glad to serve you and happy to provide you with [address] [place] [address]   [phone]           Please arrive at your scheduled appointment time. Wear comfortable, loose fitting clothing.             Feb 08, 2017  2:15 PM   [facility] Visit By Therapist with Gino Simpson,

## (undated) NOTE — LETTER
06/22/20        Cande Aguilar  1997 [facility]      Dear Jaqueline Smith,    [facility] records indicate that you have outstanding lab work and or testing that was ordered for you and has not yet been completed:  Orders Placed This Encounter

## (undated) NOTE — MR AVS SNAPSHOT
Daniel Horowitz 12 [address]  [phone]  [phone]               Thank you for choosing us for your health care visit with Ac Moyer PTA.   We are glad to serve you and happy to provide you with [address]   [phone]           Please arrive at your scheduled appointment time. Wear comfortable, loose fitting clothing.             Mar 18, 2017  1:00 PM   [facility] Follow Up [facility] Leida Man, [facility]

## (undated) NOTE — MR AVS SNAPSHOT
Daniel Horowitz 12 [facility] 43 [address]  [phone]  [phone]               Thank you for choosing us for your health care visit with Michael Bell PT.   We are glad to serve you and happy to provide you with [place] [address]   [phone]           \"Plan to arrive at least 10 minutes prior to the appointment at the [facility] and [place] in the [facility].  [place] at the [facility]  then ask to be directed to the \"\"Outpatient Nutrition Edu

## (undated) NOTE — LETTER
AUTHORIZATION FOR SURGICAL OPERATION OR OTHER PROCEDURE    1. I hereby authorize Dr. Smith/Anabell Blount PA-C , and [facility] staff assigned to my case to perform the following operation and/or procedure at the [facility]:    Left knee Durolane injection   _______________________________________________________________________________________________      _______________________________________________________________________________________________    2.  My physician has explained the nature and purpose of the operation or other procedure, possible alternative methods of treatment, the risks involved, and the possibility of complication to me.  I acknowledge that no guarantee has been made as to the result that may be obtained.  3.  I recognize that, during the course of this operation, or other procedure, unforseen conditions may necessitate additional or different procedure than those listed above.  I, therefore, further authorize and request that the above named physician, his/her physician assistants or designees perform such procedures as are, in his/her professional opinion, necessary and desirable.  4.  Any tissue or organs removed in the operation or other procedure may be disposed of by and at the discretion of the [facility] and [facility].  5.  I understand that in the event of a medical emergency, I will be transported by local paramedics to [facility] or other hospital emergency department.  6.  I certify that I have read and fully understand the above consent to operation and/or other procedure.    7.  I acknowledge that my physician has explained sedation/analgesia administration to me including the risks and benefits.  I consent to the administration of sedation/analgesia as may be necessary or desirable in the judgement of my physician.    Witness signature: ___________________________________________________ Date:   ______/______/_____                    Time:  ________ A.M.  P.M.       Patient Name:  ______________________________________________________  (please print)      Patient signature:  ___________________________________________________             Relationship to Patient:           []  Parent    Responsible person                          []  Spouse  In case of minor or                    [] Other  _____________   Incompetent name:  __________________________________________________                               (please print)      _____________      Responsible person  In case of minor or  Incompetent signature:  _______________________________________________    Statement of Physician  My signature below affirms that prior to the time of the procedure, I have explained to the patient and/or his/her guardian, the risks and benefits involved in the proposed treatment and any reasonable alternative to the proposed treatment.  I have also explained the risks and benefits involved in the refusal of the proposed treatment and have answered the patient's questions.                        Date:  ______/______/_______  Provider                      Signature:  __________________________________________________________       Time:  ___________ A.M    P.M.

## (undated) NOTE — MR AVS SNAPSHOT
[place] [facility]. [address] [place] [address]  [address]  [phone] [phone]               Thank you for choosing us for your health care visit with Zhang Arias MD.  We are glad to serve you and happy to provide you with this summary of [address]   Phone:  [phone]   Fax:  [phone]    Diagnoses:   Morbid obesity due to excess calories [facility])   Order:  Bariatrics - Internal    Korina Foster MD   [address] [place] [address]   Phone:  93 your appointment immediately. However, if you are unsure about the requirements for authorization, please wait 5-7 days and then contact your physician's office.  At that time, you will be provided with any authorization numbers or be assured that none are physician's office. At that time, you will be provided with any authorization numbers or be assured that none are required. You can then schedule your appointment.  Failure to obtain required authorization numbers can create reimbursement difficulties for y Take by mouth daily. DHA OMEGA 3 OR   Take by mouth. Ketoconazole 2 % Sham   2-3 x weekly as directed   Commonly known as:  NIZORAL           MULTI-VITAMIN/MINERALS Tabs   Take 1 tablet by mouth daily.            Rizatriptan Benzoate 10

## (undated) NOTE — MR AVS SNAPSHOT
Daniel Horowitz 12 [facility] 43 [address]  [phone]  [phone]               Thank you for choosing us for your health care visit with Mendy Fung PT.   We are glad to serve you and happy to provide you with [address] [facility]   [phone]           Please arrive at your scheduled appointment time. Wear comfortable, loose fitting clothing.             Feb 17, 2017  2:15 PM   [place] [facility] Visit By Therapist with Kaylie Enrique

## (undated) NOTE — LETTER
03/01/18        Alfredo Alcaraz  1997 [facility]      Dear Jocelyne Ratliff,    [facility] records indicate that you have outstanding lab work and or testing that was ordered for you and has not yet been completed:          Assay, Thyroid Stim H

## (undated) NOTE — LETTER
Date & Time: 4/6/2023, 9:04 AM  Patient: Navid England  Encounter Provider(s):    EDISON Lombardo       To Whom It May Concern:    Najma Mosquera was seen and treated in our department on 4/6/2023.  She can return to work Saturday April 8th     If you have any questions or concerns, please do not hesitate to call.        _____________________________  Physician/APC Signature

## (undated) NOTE — MR AVS SNAPSHOT
[facility] SPECIALTY [facility] [place] [address] [place]  [address] [phone]               Thank you for choosing us for your health care visit with Rupali Stanlye MD.  We are glad to serve you and happy to provide you with this summary of Sulfa Antibiotics Rash                   Current Medications          This list is accurate as of: 5/17/17  8:47 PM.  Always use your most recent med list.                azithromycin 500 MG Tabs   Take 1 tablet (500 mg total) by mouth daily.    Commonly k Visit [email or web address] org to learn more.            Visit [facility] online at  [facility].tn

## (undated) NOTE — LETTER
AUTHORIZATION FOR SURGICAL OPERATION OR OTHER PROCEDURE    1.  I hereby authorize Dr. Mook Garcia, and [facility] staff assigned to my case to perform the following operation and/or procedure at the [facility]:    ______________________________ Patient signature:  ___________________________________________________             Relationship to Patient:             Parent    Responsible person                            Spouse  In case of minor or                     Other  _____________   Incompet

## (undated) NOTE — LETTER
AUTHORIZATION FOR SURGICAL OPERATION OR OTHER PROCEDURE    1. I hereby authorize Dr. Lukasz Price, and [facility] staff assigned to my case to perform the following operation and/or procedure at the [facility], [facility]:    _______________________________________________________________________________________________    Cortisone injection Bilateral knees  _______________________________________________________________________________________________    2. My physician has explained the nature and purpose of the operation or other procedure, possible alternative methods of treatment, the risks involved, and the possibility of complication to me. I acknowledge that no guarantee has been made as to the result that may be obtained. 3.  I recognize that, during the course of this operation, or other procedure, unforseen conditions may necessitate additional or different procedure than those listed above. I, therefore, further authorize and request that the above named physician, his/her physician assistants or designees perform such procedures as are, in his/her professional opinion, necessary and desirable. 4.  Any tissue or organs removed in the operation or other procedure may be disposed of by and at the discretion of the [facility], [facility] and [facility]. 5.  I understand that in the event of a medical emergency, I will be transported by local paramedics to [facility] or other hospital emergency department. 6.  I certify that I have read and fully understand the above consent to operation and/or other procedure. 7.  I acknowledge that my physician has explained sedation/analgesia administration to me including the risks and benefits. I consent to the administration of sedation/analgesia as may be necessary or desirable in the judgement of my physician.     Witness signature: ___________________________________________________ Date:  ______/______/_____ Time:  ________ A. M.  P.M. Patient Name:  ______________________________________________________  (please print)      Patient signature:  ___________________________________________________             Relationship to Patient:           []  Parent    Responsible person                          []  Spouse  In case of minor or                    [] Other  _____________   Incompetent name:  __________________________________________________                               (please print)      _____________      Responsible person  In case of minor or  Incompetent signature:  _______________________________________________    Statement of Physician  My signature below affirms that prior to the time of the procedure, I have explained to the patient and/or his/her guardian, the risks and benefits involved in the proposed treatment and any reasonable alternative to the proposed treatment. I have also explained the risks and benefits involved in the refusal of the proposed treatment and have answered the patient's questions.                         Date:  ______/______/_______  Provider                      Signature:  __________________________________________________________       Time:  ___________ A.M    P.M.

## (undated) NOTE — ED AVS SNAPSHOT
Emanuel Aguilar   MRN: [id number]    Department:  [facility] Emergency Department   Date of Visit:  10/11/2017           Disclosure     Insurance plans vary and the physician(s) referred by the ER may not be covered by your plan.  Please contac CARE PHYSICIAN AT ONCE OR RETURN IMMEDIATELY TO THE EMERGENCY DEPARTMENT. If you have been prescribed any medication(s), please fill your prescription right away and begin taking the medication(s) as directed.   If you believe that any of the medications

## (undated) NOTE — MR AVS SNAPSHOT
Daniel Horowitz [address] [place]  [phone]               Thank you for choosing us for your health care visit with Angie Cuevas PTA.   We are glad to serve you and happy to provide you with [address] [place] [address]   [phone]           Please arrive at your scheduled appointment time. Wear comfortable, loose fitting clothing.             Feb 13, 2017  2:15 PM   [facility] Visit By Therapist with Curt Keith,

## (undated) NOTE — MR AVS SNAPSHOT
Daniel Horowitz 12 [facility] [address] [id number]  [phone]  [phone]               Thank you for choosing us for your health care visit with Oli Dumont PT.   We are glad to serve you and happy to provide you with [address] [facility]   [phone]           Please arrive at your scheduled appointment time. Wear comfortable, loose fitting clothing.             Mar 18, 2017  1:00 PM   Dietary Follow Up with Valery Winn, [address] [place] [address] N

## (undated) NOTE — MR AVS SNAPSHOT
Daniel Horowitz 12 [facility] [address] [id number]  [phone]  [phone]               Thank you for choosing us for your health care visit with Inge Berry PT.   We are glad to serve you and happy to provide you with [place] [address]   [phone]           \"Plan to arrive at least 10 minutes prior to the appointment at the [facility] and [place] in the [facility].  [place] at the [facility]  then ask to be directed to the \"\"Outpatient Nutrition Edu